# Patient Record
Sex: MALE | Race: WHITE | Employment: OTHER | URBAN - METROPOLITAN AREA
[De-identification: names, ages, dates, MRNs, and addresses within clinical notes are randomized per-mention and may not be internally consistent; named-entity substitution may affect disease eponyms.]

---

## 2017-03-07 RX ORDER — ATORVASTATIN CALCIUM 40 MG/1
TABLET, FILM COATED ORAL
Qty: 90 TAB | Refills: 3 | Status: SHIPPED | OUTPATIENT
Start: 2017-03-07 | End: 2017-12-22 | Stop reason: SDUPTHER

## 2017-05-16 RX ORDER — METOPROLOL TARTRATE 50 MG/1
TABLET ORAL
Qty: 180 TAB | Refills: 3 | Status: SHIPPED | OUTPATIENT
Start: 2017-05-16 | End: 2019-01-11

## 2017-06-19 ENCOUNTER — OFFICE VISIT (OUTPATIENT)
Dept: UROLOGY | Age: 72
End: 2017-06-19

## 2017-06-19 ENCOUNTER — HOSPITAL ENCOUNTER (OUTPATIENT)
Dept: LAB | Age: 72
Discharge: HOME | End: 2017-06-19
Payer: MEDICARE

## 2017-06-19 VITALS
HEIGHT: 69 IN | TEMPERATURE: 98 F | WEIGHT: 197 LBS | OXYGEN SATURATION: 97 % | BODY MASS INDEX: 29.18 KG/M2 | DIASTOLIC BLOOD PRESSURE: 64 MMHG | HEART RATE: 59 BPM | SYSTOLIC BLOOD PRESSURE: 120 MMHG

## 2017-06-19 DIAGNOSIS — C61 PROSTATE CA (HCC): ICD-10-CM

## 2017-06-19 DIAGNOSIS — C61 PROSTATE CA (HCC): Primary | ICD-10-CM

## 2017-06-19 LAB
BILIRUB UR QL STRIP: NEGATIVE
GLUCOSE UR-MCNC: NEGATIVE MG/DL
KETONES P FAST UR STRIP-MCNC: NEGATIVE MG/DL
PH UR STRIP: 7 [PH] (ref 4.6–8)
PROT UR QL STRIP: NEGATIVE MG/DL
PSA SERPL-MCNC: <0.1 NG/ML (ref 0–4)
SP GR UR STRIP: 1.02 (ref 1–1.03)
UA UROBILINOGEN AMB POC: NORMAL (ref 0.2–1)
URINALYSIS CLARITY POC: CLEAR
URINALYSIS COLOR POC: YELLOW
URINE BLOOD POC: NEGATIVE
URINE LEUKOCYTES POC: NEGATIVE
URINE NITRITES POC: NEGATIVE

## 2017-06-19 PROCEDURE — 84153 ASSAY OF PSA TOTAL: CPT | Performed by: UROLOGY

## 2017-06-19 NOTE — PROGRESS NOTES
Mr. Corinne Bulla has a reminder for a \"due or due soon\" health maintenance. I have asked that he contact his primary care provider for follow-up on this health maintenance.

## 2017-06-19 NOTE — MR AVS SNAPSHOT
Visit Information Date & Time Provider Department Dept. Phone Encounter #  
 6/19/2017  9:30 AM Alphonso Eulogio, [address] [facility] [phone] Follow-up Instructions Return in about 6 months (around 12/19/2017) for psa. Follow-up and Disposition History Your Appointments 10/2/2017  8:15 AM  
ESTABLISHED PATIENT with Lynn Morris MD  
[facility] ([address] [place] [address] Road) Appt Note: 1 year follow up  
 [address] [facility] [address]  
  
   
 [address] [place] [phone]  
  
    
 12/15/2017  7:55 AM  
LAB with [facility] NURSE VISIT Internist of [facility][note 3] ([address] [place] [address]) Appt Note: 1 yr cpe  
 [address] [facility], [address] [place] [address] [place]  
  
   
 [address], [address]  
  
    
 12/18/2017  9:15 AM  
Office Visit with Lotus Roberto MD  
[facility] [address] [place] [address]) Appt Note: PSA  
 [address] [address]  
[phone] [address] [phone]  
  
    
 12/22/2017  1:00 PM  
PHYSICAL with Sonya Tim MD  
Internist of [address]) Appt Note: rpe  
 [address] [facility], [address] [place] [address] [place]  
  
   
 [address], [address] Upcoming Health Maintenance Date Due DTaP/Tdap/Td series (1 - Tdap) 6/24/2009 MEDICARE YEARLY EXAM 6/21/2017 INFLUENZA AGE 9 TO ADULT 8/1/2017 GLAUCOMA SCREENING Q2Y 5/10/2019 COLONOSCOPY 5/9/2022 Allergies as of 6/19/2017  Review Complete On: 6/19/2017 By: Prerna José No Known Allergies Current Immunizations  Reviewed on 6/17/2016 Name Date Influenza High Dose Vaccine PF 12/6/2016 Influenza Vaccine PF 10/31/2014  2:55 PM  
 Influenza Vaccine Whole 1/1/2010 Pneumococcal Conjugate (PCV-13) 6/9/2014 Pneumococcal Polysaccharide (PPSV-23) 10/31/2014  2:57 PM  
 Pneumococcal Vaccine (Unspecified Type) 1/1/2009 TD Vaccine 6/23/2009 Zoster Vaccine, Live 1/1/2011 Not reviewed this visit You Were Diagnosed With   
  
 Codes Comments Prostate CA [facility])    -  Primary ICD-10-CM: Y35 ICD-9-CM: 926 Vitals BP Pulse Temp Height(growth percentile) Weight(growth percentile) SpO2  
 120/64 (BP 1 Location: Left arm, BP Patient Position: Sitting) (!) 59 98 °F (36.7 °C) 5' 9\" (1.753 m) 197 lb (89.4 kg) 97% BMI Smoking Status 29.09 kg/m2 Never Smoker Vitals History BMI and BSA Data Body Mass Index Body Surface Area  
 29.09 kg/m 2 2.09 m 2 Preferred Pharmacy Pharmacy Name Phone [address] [phone] Your Updated Medication List  
  
   
This list is accurate as of: 6/19/17 10:08 AM.  Always use your most recent med list.  
  
  
  
  
 aspirin 81 mg tablet Take 1 Tab by mouth daily. atorvastatin 40 mg tablet Commonly known as:  LIPITOR  
TAKE 1 TABLET EVERY DAY  
  
 cholecalciferol 1,000 unit Cap Commonly known as:  VITAMIN D3 Take  by mouth as needed. metoprolol tartrate 50 mg tablet Commonly known as:  LOPRESSOR  
TAKE 1 TABLET TWICE A DAY  
  
 nitroglycerin 0.4 mg SL tablet Commonly known as:  NITROSTAT  
1 Tab by SubLINGual route every five (5) minutes as needed for up to 3 doses. OCUVITE tablet Generic drug:  vitamin a-vitamin c-vit e-min Take 1 Tab by mouth daily. We Performed the Following AMB POC URINALYSIS DIP STICK AUTO W/O MICRO [35407 CPT(R)] MO COLLECTION VENOUS BLOOD,VENIPUNCTURE [id number] CPT(R)] Follow-up Instructions Return in about 6 months (around 12/19/2017) for psa. To-Do List   
 06/19/2017 Lab:  PROSTATE SPECIFIC AG (PSA) Patient Instructions Prostate-Specific Antigen (PSA) Test: About This Test 
What is it? A prostate-specific antigen (PSA) test measures the amount of PSA in your blood. PSA is released by a man's prostate gland into his blood. A high PSA level may mean that you have an enlargement, infection, or cancer of the prostate. Why is this test done? You may have this test to: · Check for prostate cancer. · Watch prostate cancer and see if treatment is working. How can you prepare for the test? 
Do not ejaculate during the 2 days before your PSA blood test, either during sex or masturbation. What happens before the test? 
Tell your doctor if you have had a: 
· Test to look at your bladder (cystoscopy) in the past several weeks. · Prostate biopsy in the past several weeks. · Prostate infection or urinary tract infection that has not gone away. · Tube (catheter) inserted into your bladder to drain urine recently. What happens during the test? 
A health professional takes a sample of your blood. What happens after the test? 
You can go back to your usual activities right away. When should you call for help? Watch closely for changes in your health, and be sure to contact your doctor if you have any questions about this test. 
Follow-up care is a key part of your treatment and safety. Be sure to make and go to all appointments, and call your doctor if you are having problems. It's also a good idea to keep a list of the medicines you take. Ask your doctor when you can expect to have your test results. Where can you learn more? Go to http://tere-bonifacio.info/. Enter L731 in the search box to learn more about \"Prostate-Specific Antigen (PSA) Test: About This Test.\" Current as of: July 26, 2016 Content Version: 11.2 © 4055-3677 [employer].  Care instructions adapted under license by [employer] (which disclaims liability or warranty for this information). If you have questions about a medical condition or this instruction, always ask your healthcare professional. [address] any warranty or liability for your use of this information. Patient Instructions History Introducing [address] Ana María Cain introduces [employer] patient portal. Now you can access parts of your medical record, email your doctor's office, and request medication refills online. 1. In your internet browser, go to [email or web address]. Beeline/"[email or web address] 2. Click on the First Time User? Click Here link in the Sign In box. You will see the New Member Sign Up page. 3. Enter your [employer] Access Code exactly as it appears below. You will not need to use this code after youve completed the sign-up process. If you do not sign up before the expiration date, you must request a new code. · [employer] Access Code: [email or web address] Expires: 9/17/2017  9:29 AM 
 
4. Enter the last four digits of your Social Security Number (xxxx) and Date of Birth (mm/dd/yyyy) as indicated and click Submit. You will be taken to the next sign-up page. 5. Create a [employer] ID. This will be your [employer] login ID and cannot be changed, so think of one that is secure and easy to remember. 6. Create a [employer] password. You can change your password at any time. 7. Enter your Password Reset Question and Answer. This can be used at a later time if you forget your password. 8. Enter your e-mail address. You will receive e-mail notification when new information is available in [address] 9. Click Sign Up. You can now view and download portions of your medical record. 10. Click the Download Summary menu link to download a portable copy of your medical information. If you have questions, please visit the Frequently Asked Questions section of the [employer] website. Remember, [employer] is NOT to be used for urgent needs. For medical emergencies, dial 911. Now available from your iPhone and Android! Please provide this summary of care documentation to your next provider. Your primary care clinician is listed as Yoselin Morrissey. If you have any questions after today's visit, please call [phone].

## 2017-06-19 NOTE — PATIENT INSTRUCTIONS
Prostate-Specific Antigen (PSA) Test: About This Test  What is it? A prostate-specific antigen (PSA) test measures the amount of PSA in your blood. PSA is released by a man's prostate gland into his blood. A high PSA level may mean that you have an enlargement, infection, or cancer of the prostate. Why is this test done? You may have this test to:  · Check for prostate cancer. · Watch prostate cancer and see if treatment is working. How can you prepare for the test?  Do not ejaculate during the 2 days before your PSA blood test, either during sex or masturbation. What happens before the test?  Tell your doctor if you have had a:  · Test to look at your bladder (cystoscopy) in the past several weeks. · Prostate biopsy in the past several weeks. · Prostate infection or urinary tract infection that has not gone away. · Tube (catheter) inserted into your bladder to drain urine recently. What happens during the test?  A health professional takes a sample of your blood. What happens after the test?  You can go back to your usual activities right away. When should you call for help? Watch closely for changes in your health, and be sure to contact your doctor if you have any questions about this test.  Follow-up care is a key part of your treatment and safety. Be sure to make and go to all appointments, and call your doctor if you are having problems. It's also a good idea to keep a list of the medicines you take. Ask your doctor when you can expect to have your test results. Where can you learn more? Go to http://tere-bonifacio.info/. Enter J555 in the search box to learn more about \"Prostate-Specific Antigen (PSA) Test: About This Test.\"  Current as of: July 26, 2016  Content Version: 11.2  © 6330-8979 [employer]. Care instructions adapted under license by [employer] (which disclaims liability or warranty for this information).  If you have questions about a medical condition or this instruction, always ask your healthcare professional. [place] [address] any warranty or liability for your use of this information.

## 2017-06-19 NOTE — PROGRESS NOTES
Derick Tavera    Chief Complaint   Patient presents with    Prostate Cancer       History and Physical    The patient is now 20 months postop buttock prostatectomy at [facility] for a stage T2c prostate cancer with maximum core involvement of 16%, [place] score 7 preoperative PSA 6.6 and 6 of 12 cores positive. The patient is voiding well. Excellent control. No bowel issues. No dysuria or hematuria or restriction of the stream.    The patient is noticing return of his erections. He has used his vacuum erection device on a couple of occasions but only to inflate the penis within the cylinder and has not applied the constriction ring either for intercourse or to rehab the penis by reducing the intracavernosal pressure. I have again discussed using the vacuum erection device as a rehabilitative mechanism to speed up the return of natural erection.     Because the patient is on nitroglycerin he is not a candidate for oral medication and we will consider suppositories as needed    Past Medical History:   Diagnosis Date    Basal cell carcinoma     right arm Dr Albin Dyer CAD (coronary artery disease) 4/12    s/p stent LAD Dr. Lia Crystal Colon polyp     2007; Dr Florentino Abarca 5/17    Elevated prostate specific antigen (PSA)     Essential hypertension     Frozen shoulder syndrome 2007    Dr. Santa Bowling Hyperlipidemia     Hyperlipidemia     Hypertrophy of prostate with urinary obstruction and other lower urinary tract symptoms (LUTS)     IBS (irritable bowel syndrome)     Impaired fasting glucose 3/12    Obesity     peak weight 217 lbs, bmi 32 from 12/14    Phymatous rosacea     Prostate CA ([facility] [address]     T2C [place] 7 Dr Jorje Ya 8/15    Squamous cell cancer of skin of nose 2016    s/p Moh's Dr Albin Dyer Zoster 1980s     Patient Active Problem List   Diagnosis Code    Impaired fasting glucose R73.01    Hyperlipidemia E78.5    Prostate ca [facility]) [place] 7 s/p [place] prostatectomy Dr Mai Jon 10/15 C61    Essential hypertension I10    Coronary artery disease involving native coronary artery without angina pectoris Dr Tawanda Pena I25.10    Advance directive discussed with patient Z71.89     Past Surgical History:   Procedure Laterality Date    ABDOMEN SURGERY PROC UNLISTED  1988    inguinal hernia repair on left    HX COLONOSCOPY      Dr Sixto Mckeon 2007 polyp; Dr Sixto Mckeon 5/17 polyp    HX CORONARY STENT PLACEMENT  3/12    3/12 prox LAD AJITH    HX TONSILLECTOMY      HX UROLOGICAL  06/10    TURP    HX UROLOGICAL  05/10    TURP with biopsy 7/08, 5/10    HX UROLOGICAL  10/15    DaVinci prostatectomy Dr Berna Raymundo [facility] [address] [place] [address], NEEDLE, SATURATION SAMPLING      06/10, 5/10, 07/08, 8/15     Current Outpatient Prescriptions   Medication Sig Dispense Refill    metoprolol tartrate (LOPRESSOR) 50 mg tablet TAKE 1 TABLET TWICE A  Tab 3    atorvastatin (LIPITOR) 40 mg tablet TAKE 1 TABLET EVERY DAY 90 Tab 3    vitamin a-vitamin c-vit e-min (OCUVITE) tablet Take 1 Tab by mouth daily.  aspirin 81 mg tablet Take 1 Tab by mouth daily. 90 Tab 3    nitroglycerin (NITROSTAT) 0.4 mg SL tablet 1 Tab by SubLINGual route every five (5) minutes as needed for up to 3 doses. 25 Tab 0    Cholecalciferol, Vitamin D3, 1,000 unit cap Take  by mouth as needed.        No Known Allergies  Social History     Social History    Marital status:      Spouse name: N/A    Number of children: 3    Years of education: N/A     Occupational History    engr [employer]      Social History Main Topics    Smoking status: Never Smoker    Smokeless tobacco: Never Used    Alcohol use Yes      Comment: greater than 15-20 12-ounce beers/week    Drug use: No    Sexual activity: Not on file     Other Topics Concern    Not on file     Social History Narrative      Family History   Problem Relation Age of Onset    Heart Disease Mother     Lung Disease Father     Dementia Father     Stroke Paternal Grandmother Visit Vitals    /64 (BP 1 Location: Left arm, BP Patient Position: Sitting)    Pulse (!) 59    Temp 98 °F (36.7 °C)    Ht 5' 9\" (1.753 m)    Wt 197 lb (89.4 kg)    SpO2 97%    BMI 29.09 kg/m2     Physical        Gen: WDWN adult NAD  Head  : normocephalic,  Normal ROM; eyes without normal pupils, EOMs, no masses;  conjunctiva normal  Neck: normal movement,  no evident mass,  No evident adenopathy, trachea midline,    Abd :bowel sounds normal, no masses, tenderness, organomegaly  Flanks     -    Extremities- no edema, arthritis, deformity, swelling  Psych- oriented, no evident anxiety, no cognitive impairment evident            Urine is negative  PSA is drawn      Impression/ PLAN  Patient is 20 months postop robotic prostatectomy for prostate cancer, [place] 7. PSA has been undetectable  Plan: We will call the patient with current PSA and patient will return in 6 months            This visit exceeded 15 minutes and >50% was counselling  The patient understands the discussion and plan    PLEASE NOTE:      This document has been produced using voice recognition software.   Unrecognized errors in transcription may be present    Kim Crandall MD

## 2017-10-02 ENCOUNTER — OFFICE VISIT (OUTPATIENT)
Dept: CARDIOLOGY CLINIC | Age: 72
End: 2017-10-02

## 2017-10-02 VITALS
WEIGHT: 199 LBS | SYSTOLIC BLOOD PRESSURE: 139 MMHG | DIASTOLIC BLOOD PRESSURE: 76 MMHG | HEART RATE: 58 BPM | HEIGHT: 69 IN | BODY MASS INDEX: 29.47 KG/M2

## 2017-10-02 DIAGNOSIS — E78.00 PURE HYPERCHOLESTEROLEMIA: ICD-10-CM

## 2017-10-02 DIAGNOSIS — Z95.5 HISTORY OF CORONARY ARTERY STENT PLACEMENT: ICD-10-CM

## 2017-10-02 DIAGNOSIS — I10 ESSENTIAL HYPERTENSION: ICD-10-CM

## 2017-10-02 DIAGNOSIS — I25.10 CORONARY ARTERY DISEASE INVOLVING NATIVE CORONARY ARTERY OF NATIVE HEART WITHOUT ANGINA PECTORIS: Primary | ICD-10-CM

## 2017-10-02 NOTE — PATIENT INSTRUCTIONS
There are no discontinued medications.     Orders Placed This Encounter    LIPID PANEL     Standing Status:   Future     Standing Expiration Date:   1/2/2018    HEPATIC FUNCTION PANEL     Standing Status:   Future     Standing Expiration Date:   1/2/2018    SCHEDULE NUCLEAR STUDY     exercise    AMB POC EKG ROUTINE W/ 12 LEADS, INTER & REP     Order Specific Question:   Reason for Exam:     Answer:   hypertension

## 2017-10-02 NOTE — PROGRESS NOTES
1. Have you been to the ER, urgent care clinic since your last visit? Hospitalized since your last visit? No    2. Have you seen or consulted any other health care providers outside of the [address] [place] [address] since your last visit? Include any pap smears or colon screening. Yes Where: Urologist     3. Since your last visit, have you had any of the following symptoms? .           4. Have you had any blood work, X-rays or cardiac testing? Yes Where: Urologist Office Reason for visit: Labs             5.  Where do you normally have your labs drawn? PCP Office    6. Do you need any refills today?    No

## 2017-10-02 NOTE — LETTER
Eliud Carrasquillo 
1945 
 
10/2/2017 Dear MD Atif Munguia MD Glenda Alexander, MD 
 
I had the pleasure of evaluating  Mr. Bozena Tracy in office today. Below are the relevant portions of my assessment and plan of care. ICD-10-CM ICD-9-CM 1. Coronary artery disease involving native coronary artery of native heart without angina pectoris I25.10 414.01 SCHEDULE NUCLEAR STUDY  
 10/17; 10/16 Stable symptoms; no angina 
surveillance stress test  
2. Essential hypertension I10 401.9 AMB POC EKG ROUTINE W/ 12 LEADS, INTER & REP Controlled; home BPs 120s/80s 3. Pure hypercholesterolemia E78.00 272.0 LIPID PANEL  
   HEPATIC FUNCTION PANEL  
 12/15 LDL59;   
4. History of coronary artery stent placement Z95.5 V45.82 SCHEDULE NUCLEAR STUDY  
 3/12 LAD PCI Current Outpatient Prescriptions Medication Sig Dispense Refill  metoprolol tartrate (LOPRESSOR) 50 mg tablet TAKE 1 TABLET TWICE A  Tab 3  
 atorvastatin (LIPITOR) 40 mg tablet TAKE 1 TABLET EVERY DAY 90 Tab 3  
 nitroglycerin (NITROSTAT) 0.4 mg SL tablet 1 Tab by SubLINGual route every five (5) minutes as needed for up to 3 doses. 25 Tab 0  Cholecalciferol, Vitamin D3, 1,000 unit cap Take  by mouth as needed.  vitamin a-vitamin c-vit e-min (OCUVITE) tablet Take 1 Tab by mouth daily.  aspirin 81 mg tablet Take 1 Tab by mouth daily. 90 Tab 3 Orders Placed This Encounter  LIPID PANEL Standing Status:   Future Standing Expiration Date:   1/2/2018  
 HEPATIC FUNCTION PANEL Standing Status:   Future Standing Expiration Date:   1/2/2018  SCHEDULE NUCLEAR STUDY  
  exercise  AMB POC EKG ROUTINE W/ 12 LEADS, INTER & REP Order Specific Question:   Reason for Exam: Answer:   hypertension If you have questions, please do not hesitate to call me. I look forward to following Mr. Bozena Tracy along with you. Sincerely, King Ottoniel MD

## 2017-10-02 NOTE — MR AVS SNAPSHOT
Visit Information Date & Time Provider Department Dept. Phone Encounter #  
 10/2/2017 12:30 PM Ngoc Gee MD [facility] 835 [id number] Follow-up Instructions Return in about 4 months (around 2/2/2018), or if symptoms worsen or fail to improve, for same day post test.  
  
Your Appointments 12/15/2017  7:55 AM  
LAB with [facility] NURSE VISIT [facility]) Appt Note: 1 yr cpe  
 [address] [facility], [address] [address] [address] [place]  
  
   
 [address] [facility]  
  
    
 12/18/2017  9:15 AM  
Office Visit with Lucretia Saeed MD  
[facility]) Appt Note: PSA  
 [address] [address]  
[phone] [address] [address]  
  
    
 12/22/2017  1:00 PM  
PHYSICAL with Montana Soares MD  
[facility]) Appt Note: rpe  
 [address] [facility], [address] [address] [address] [place]  
  
   
 [address] [facility]  
  
    
 1/8/2018  7:30 AM  
PROCEDURE with FER DIALLO [facility] [place] [facility] Appt Note: Est/Guerrero/Same day follow up  
 [address] [facility] [address]  
  
   
 [id number] [place] [address] Upcoming Health Maintenance Date Due DTaP/Tdap/Td series (1 - Tdap) 6/24/2009 MEDICARE YEARLY EXAM 6/21/2017 INFLUENZA AGE 9 TO ADULT 8/1/2017 GLAUCOMA SCREENING Q2Y 5/10/2019 COLONOSCOPY 5/9/2022 Allergies as of 10/2/2017  Review Complete On: 10/2/2017 By: Ngoc Gee MD  
 No Known Allergies Current Immunizations  Reviewed on 6/17/2016 Name Date Influenza High Dose Vaccine PF 12/6/2016 Influenza Vaccine PF 10/31/2014  2:55 PM  
 Influenza Vaccine Whole 1/1/2010 Pneumococcal Conjugate (PCV-13) 6/9/2014 Pneumococcal Polysaccharide (PPSV-23) 10/31/2014  2:57 PM  
 TD Vaccine 6/23/2009 ZZZ-RETIRED (DO NOT USE) Pneumococcal Vaccine (Unspecified Type) 1/1/2009 Zoster Vaccine, Live 1/1/2011 Not reviewed this visit You Were Diagnosed With   
  
 Codes Comments Coronary artery disease involving native coronary artery of native heart without angina pectoris    -  Primary ICD-10-CM: I25.10 ICD-9-CM: 414.01 10/17; 10/16 Stable symptoms; no angina 
surveillance stress test  
 Essential hypertension     ICD-10-CM: I10 
ICD-9-CM: 401.9 Controlled; home BPs 120s/80s Pure hypercholesterolemia     ICD-10-CM: E78.00 ICD-9-CM: 272.0 12/15 LDL59;   
 History of coronary artery stent placement     ICD-10-CM: Z95.5 ICD-9-CM: V45.82 3/12 LAD PCI Vitals BP Pulse Height(growth percentile) Weight(growth percentile) BMI Smoking Status 139/76 (!) 58 5' 9\" (1.753 m) 199 lb (90.3 kg) 29.39 kg/m2 Never Smoker Vitals History BMI and BSA Data Body Mass Index Body Surface Area  
 29.39 kg/m 2 2.1 m 2 Preferred Pharmacy Pharmacy Name Phone Lauren Mao [address] [place] [address] [address] [phone] Your Updated Medication List  
  
   
This list is accurate as of: 10/2/17  1:13 PM.  Always use your most recent med list.  
  
  
  
  
 aspirin 81 mg tablet Take 1 Tab by mouth daily. atorvastatin 40 mg tablet Commonly known as:  LIPITOR  
TAKE 1 TABLET EVERY DAY  
  
 cholecalciferol 1,000 unit Cap Commonly known as:  VITAMIN D3 Take  by mouth as needed. metoprolol tartrate 50 mg tablet Commonly known as:  LOPRESSOR  
TAKE 1 TABLET TWICE A DAY  
  
 nitroglycerin 0.4 mg SL tablet Commonly known as:  NITROSTAT  
1 Tab by SubLINGual route every five (5) minutes as needed for up to 3 doses. OCUVITE tablet Generic drug:  vitamin a-vitamin c-vit e-min Take 1 Tab by mouth daily. We Performed the Following AMB POC EKG ROUTINE W/ 12 LEADS, INTER & REP [76136 CPT(R)] SCHEDULE NUCLEAR STUDY [SAD3221 Custom] Comments:  
 exercise Follow-up Instructions Return in about 4 months (around 2018), or if symptoms worsen or fail to improve, for same day post test.  
  
To-Do List   
 Around 10/02/2017 Lab:  HEPATIC FUNCTION PANEL Around 10/02/2017 Lab:  LIPID PANEL Patient Instructions There are no discontinued medications. Orders Placed This Encounter  LIPID PANEL Standing Status:   Future Standing Expiration Date:   2018  
 HEPATIC FUNCTION PANEL Standing Status:   Future Standing Expiration Date:   2018  SCHEDULE NUCLEAR STUDY  
  exercise  AMB POC EKG ROUTINE W/ 12 LEADS, INTER & REP Order Specific Question:   Reason for Exam: Answer:   hypertension Introducing [facility]! Delmar Bonilla introduces [employer] patient portal. Now you can access parts of your medical record, email your doctor's office, and request medication refills online. 1. In your internet browser, go to [email or web address] 2. Click on the First Time User? Click Here link in the Sign In box. You will see the New Member Sign Up page. 3. Enter your [employer] Access Code exactly as it appears below. You will not need to use this code after youve completed the sign-up process. If you do not sign up before the expiration date, you must request a new code. · [employer] Access Code: [email or web address] Expires: 2017  1:09 PM 
 
4. Enter the last four digits of your Social Security Number (xxxx) and Date of Birth (mm/dd/yyyy) as indicated and click Submit. You will be taken to the next sign-up page. 5. Create a [employer] ID. This will be your [employer] login ID and cannot be changed, so think of one that is secure and easy to remember. 6. Create a [employer] password. You can change your password at any time. 7. Enter your Password Reset Question and Answer. This can be used at a later time if you forget your password. 8. Enter your e-mail address. You will receive e-mail notification when new information is available in [address] 9. Click Sign Up. You can now view and download portions of your medical record. 10. Click the Download Summary menu link to download a portable copy of your medical information. If you have questions, please visit the Frequently Asked Questions section of the [employer] website. Remember, [employer] is NOT to be used for urgent needs. For medical emergencies, dial 911. Now available from your iPhone and Android! Please provide this summary of care documentation to your next provider. Your primary care clinician is listed as Raz Crow. If you have any questions after today's visit, please call [phone].

## 2017-10-02 NOTE — PROGRESS NOTES
HISTORY OF PRESENT ILLNESS  Jerald Nuñez is a 80 y.o. male. HPI Comments: F/u CAD, old LAD PCI, HTN, HLD    Patient denies significant chest pain, SOB, palpitations, edema, dizziness      Cholesterol Problem   The history is provided by the medical records. This is a chronic problem. Pertinent negatives include no chest pain, no headaches and no shortness of breath. Hypertension   The history is provided by the medical records. This is a chronic problem. Pertinent negatives include no chest pain, no headaches and no shortness of breath. Review of Systems   Constitutional: Negative for chills, fever, malaise/fatigue and weight loss. HENT: Negative for nosebleeds. Eyes: Negative for discharge. Respiratory: Negative for cough, shortness of breath and wheezing. Cardiovascular: Negative for chest pain, palpitations, orthopnea, claudication, leg swelling and PND. Gastrointestinal: Negative for diarrhea, nausea and vomiting. Genitourinary: Negative for dysuria and hematuria. Musculoskeletal: Negative for joint pain. Skin: Negative for rash. Neurological: Negative for dizziness, seizures, loss of consciousness and headaches. Endo/Heme/Allergies: Negative for polydipsia. Does not bruise/bleed easily. Psychiatric/Behavioral: Negative for depression and substance abuse. The patient does not have insomnia.       No Known Allergies    Past Medical History:   Diagnosis Date    Basal cell carcinoma     right arm Dr Roger Montesinos CAD (coronary artery disease) 4/12    s/p stent LAD Dr. Abisai Guerrero Colon polyp     2007; Dr Tatiana Diaz 5/17    Elevated prostate specific antigen (PSA)     Essential hypertension     Frozen shoulder syndrome 2007    Dr. Fausto Messina Hyperlipidemia     Hyperlipidemia     Hypertrophy of prostate with urinary obstruction and other lower urinary tract symptoms (LUTS)     IBS (irritable bowel syndrome)     Impaired fasting glucose 3/12    Obesity     peak weight 217 lbs, bmi 28 from 12/14    Phymatous rosacea     Prostate CA (HCC)     T2C [place] 7 Dr Eduardo Arm 8/15    Squamous cell cancer of skin of nose 2016    s/p Moh's Dr Bev Martin Zoster 1980s       Family History   Problem Relation Age of Onset    Heart Disease Mother     Lung Disease Father     Dementia Father     Stroke Paternal Grandmother        Social History   Substance Use Topics    Smoking status: Never Smoker    Smokeless tobacco: Never Used    Alcohol use Yes      Comment: greater than 15-20 12-ounce beers/week        Current Outpatient Prescriptions   Medication Sig    metoprolol tartrate (LOPRESSOR) 50 mg tablet TAKE 1 TABLET TWICE A DAY    atorvastatin (LIPITOR) 40 mg tablet TAKE 1 TABLET EVERY DAY    nitroglycerin (NITROSTAT) 0.4 mg SL tablet 1 Tab by SubLINGual route every five (5) minutes as needed for up to 3 doses.  Cholecalciferol, Vitamin D3, 1,000 unit cap Take  by mouth as needed.  vitamin a-vitamin c-vit e-min (OCUVITE) tablet Take 1 Tab by mouth daily.  aspirin 81 mg tablet Take 1 Tab by mouth daily. No current facility-administered medications for this visit.          Past Surgical History:   Procedure Laterality Date    ABDOMEN SURGERY PROC UNLISTED  1988    inguinal hernia repair on left    HX COLONOSCOPY      Dr Nadia Chatman 2007 polyp; Dr Nadia Chatman 5/17 polyp    HX CORONARY STENT PLACEMENT  3/12    3/12 prox LAD AJITH    HX TONSILLECTOMY      HX UROLOGICAL  06/10    TURP    HX UROLOGICAL  05/10    TURP with biopsy 7/08, 5/10    HX UROLOGICAL  10/15    DaVinci prostatectomy Dr Elana Weinberg in [address] [place] [address], NEEDLE, SATURATION SAMPLING      06/10, 5/10, 07/08, 8/15       Diagnostic Studies:  CARDIOLOGY STUDIES 3/28/2012 3/1/2012   EKG Result - Angelique Buff, WNL   Myocardial Perfusion Scan Result - abnormal EKG, ant ischemia, nl EF   Cardiac Cath with PCI Result AJITH prox LAD -   Some recent data might be hidden       Visit Vitals    /76    Pulse (!) 58    Ht 5' 9\" (1.753 m)    Wt 90.3 kg (199 lb)    BMI 29.39 kg/m2       Mr. Kaylie Fuentes has a reminder for a \"due or due soon\" health maintenance. I have asked that he contact his primary care provider for follow-up on this health maintenance. Physical Exam   Constitutional: He is oriented to person, place, and time. He appears well-developed and well-nourished. No distress. HENT:   Head: Normocephalic and atraumatic. Eyes: Right eye exhibits no discharge. Left eye exhibits no discharge. No scleral icterus. Neck: Neck supple. No JVD present. Carotid bruit is not present. No thyromegaly present. Cardiovascular: Normal rate, regular rhythm, S1 normal, S2 normal, normal heart sounds and intact distal pulses. Exam reveals no gallop and no friction rub. No murmur heard. Pulmonary/Chest: Effort normal and breath sounds normal. He has no wheezes. He has no rales. Abdominal: Soft. He exhibits no mass. There is no tenderness. Musculoskeletal: He exhibits no edema. Neurological: He is alert and oriented to person, place, and time. Skin: Skin is warm and dry. No rash noted. Psychiatric: He has a normal mood and affect. His behavior is normal.       ASSESSMENT and PLAN          Diagnoses and all orders for this visit:    1. Coronary artery disease involving native coronary artery of native heart without angina pectoris  Comments:  10/17; 10/16 Stable symptoms; no angina  surveillance stress test  Orders:  -     SCHEDULE NUCLEAR STUDY    2. Essential hypertension  Comments:  Controlled; home BPs 120s/80s    Orders:  -     AMB POC EKG ROUTINE W/ 12 LEADS, INTER & REP    3. Pure hypercholesterolemia  Comments:  12/15 LDL59;   Orders:  -     LIPID PANEL; Future  -     HEPATIC FUNCTION PANEL; Future    4. History of coronary artery stent placement  Comments:  3/12 LAD PCI  Orders:  -     SCHEDULE NUCLEAR STUDY        Pertinent laboratory and test data reviewed and discussed with patient.   See patient instructions also for other medical advice given    There are no discontinued medications.     Follow-up Disposition:  Return in about 4 months (around 2/2/2018), or if symptoms worsen or fail to improve, for same day post test.

## 2017-10-16 ENCOUNTER — OFFICE VISIT (OUTPATIENT)
Dept: INTERNAL MEDICINE CLINIC | Age: 72
End: 2017-10-16

## 2017-10-16 VITALS
RESPIRATION RATE: 14 BRPM | SYSTOLIC BLOOD PRESSURE: 142 MMHG | TEMPERATURE: 98.1 F | WEIGHT: 199.6 LBS | OXYGEN SATURATION: 97 % | HEART RATE: 51 BPM | BODY MASS INDEX: 29.56 KG/M2 | DIASTOLIC BLOOD PRESSURE: 80 MMHG | HEIGHT: 69 IN

## 2017-10-16 DIAGNOSIS — R00.1 BRADYCARDIA: ICD-10-CM

## 2017-10-16 DIAGNOSIS — Z23 ENCOUNTER FOR IMMUNIZATION: ICD-10-CM

## 2017-10-16 DIAGNOSIS — H81.10 POSTURAL VERTIGO, UNSPECIFIED LATERALITY: Primary | ICD-10-CM

## 2017-10-16 RX ORDER — MECLIZINE HYDROCHLORIDE 25 MG/1
TABLET ORAL
Qty: 30 TAB | Refills: 0 | Status: SHIPPED | OUTPATIENT
Start: 2017-10-16 | End: 2018-09-17

## 2017-10-16 NOTE — PROGRESS NOTES
1. Have you been to the ER, urgent care clinic or hospitalized since your last visit? NO.     2. Have you seen or consulted any other health care providers outside of the [address] [place] [address] since your last visit (Include any pap smears or colon screening)? NO      Do you have an Advanced Directive? NO    Would you like information on Advanced Directives?  NO

## 2017-10-16 NOTE — MR AVS SNAPSHOT
Visit Information Date & Time Provider Department Dept. Phone Encounter #  
 10/16/2017  1:30 PM Bassam Fuentes [facility] [phone] [id number] Your Appointments 12/15/2017  7:55 AM  
LAB with [facility] NURSE VISIT [facility]) Appt Note: 1 yr cpe  
 [address] [facility], [address] [address] [address] [place]  
  
   
 [address], [address]  
  
    
 12/18/2017  9:15 AM  
Office Visit with Shai Pereira MD  
[facility]) Appt Note: PSA  
 [address] [address]  
[phone] [address] [address]  
  
    
 12/22/2017  1:00 PM  
PHYSICAL with Andreia Sheikh MD  
[facility]) Appt Note: rpe  
 [address] [facility], [address] [place] [address] [place] [address] [place]  
  
   
 [address], [address]  
  
    
 1/8/2018  7:30 AM  
PROCEDURE with [place] IMANI [facility] [place] ([facility]) Appt Note: Est/Guerrero/Same day follow up  
 [address] [facility] [address]  
  
   
 [address] [address] [address] [address] Upcoming Health Maintenance Date Due DTaP/Tdap/Td series (1 - Tdap) 6/24/2009 MEDICARE YEARLY EXAM 6/21/2017 INFLUENZA AGE 9 TO ADULT 8/1/2017 GLAUCOMA SCREENING Q2Y 5/10/2019 COLONOSCOPY 5/9/2022 Allergies as of 10/16/2017  Review Complete On: 10/16/2017 By: GEORGE Fuentes No Known Allergies Current Immunizations  Reviewed on 6/17/2016 Name Date Influenza High Dose Vaccine PF  Incomplete, 12/6/2016 Influenza Vaccine PF 10/31/2014  2:55 PM  
 Influenza Vaccine Whole 1/1/2010 Pneumococcal Conjugate (PCV-13) 6/9/2014 Pneumococcal Polysaccharide (PPSV-23) 10/31/2014  2:57 PM  
 TD Vaccine 6/23/2009 ZZZ-RETIRED (DO NOT USE) Pneumococcal Vaccine (Unspecified Type) 1/1/2009 Zoster Vaccine, Live 1/1/2011 Not reviewed this visit You Were Diagnosed With   
  
 Codes Comments Postural vertigo, unspecified laterality    -  Primary ICD-10-CM: H81.10 ICD-9-CM: 386.11 Encounter for immunization     ICD-10-CM: U55 ICD-9-CM: V03.89 Vitals BP Pulse Temp Resp Height(growth percentile) Weight(growth percentile) 142/80 (BP 1 Location: Right arm, BP Patient Position: Sitting) (!) 51 98.1 °F (36.7 °C) (Oral) 14 5' 9\" (1.753 m) 199 lb 9.6 oz (90.5 kg) SpO2 BMI Smoking Status 97% 29.48 kg/m2 Never Smoker Vitals History BMI and BSA Data Body Mass Index Body Surface Area  
 29.48 kg/m 2 2.1 m 2 Preferred Pharmacy Pharmacy Name Phone [place] [address] [facility] [phone] Your Updated Medication List  
  
   
This list is accurate as of: 10/16/17  1:55 PM.  Always use your most recent med list.  
  
  
  
  
 aspirin 81 mg tablet Take 1 Tab by mouth daily. atorvastatin 40 mg tablet Commonly known as:  LIPITOR  
TAKE 1 TABLET EVERY DAY  
  
 cholecalciferol 1,000 unit Cap Commonly known as:  VITAMIN D3 Take  by mouth as needed. meclizine 25 mg tablet Commonly known as:  ANTIVERT Take 0.5-1 tab by mouth 3 times daily as needed for vertigo. metoprolol tartrate 50 mg tablet Commonly known as:  LOPRESSOR  
TAKE 1 TABLET TWICE A DAY  
  
 nitroglycerin 0.4 mg SL tablet Commonly known as:  NITROSTAT  
1 Tab by SubLINGual route every five (5) minutes as needed for up to 3 doses. OCUVITE tablet Generic drug:  vitamin a-vitamin c-vit e-min Take 1 Tab by mouth daily. Prescriptions Sent to Pharmacy Refills  
 meclizine (ANTIVERT) 25 mg tablet 0 Sig: Take 0.5-1 tab by mouth 3 times daily as needed for vertigo.   
 Class: Normal  
 Pharmacy: [facility] [address] [facility] #: [phone] We Performed the Following ADMIN PNEUMOCOCCAL VACCINE [ HCP] INFLUENZA VIRUS VACCINE, HIGH DOSE SEASONAL, PRESERVATIVE FREE [86499 CPT(R)] Introducing [facility]! Neena Rucker introduces [employer] patient portal. Now you can access parts of your medical record, email your doctor's office, and request medication refills online. 1. In your internet browser, go to [email or web address] 2. Click on the First Time User? Click Here link in the Sign In box. You will see the New Member Sign Up page. 3. Enter your [employer] Access Code exactly as it appears below. You will not need to use this code after youve completed the sign-up process. If you do not sign up before the expiration date, you must request a new code. · [employer] Access Code: [email or web address] Expires: 12/31/2017  1:09 PM 
 
4. Enter the last four digits of your Social Security Number (xxxx) and Date of Birth (mm/dd/yyyy) as indicated and click Submit. You will be taken to the next sign-up page. 5. Create a [employer] ID. This will be your [employer] login ID and cannot be changed, so think of one that is secure and easy to remember. 6. Create a [employer] password. You can change your password at any time. 7. Enter your Password Reset Question and Answer. This can be used at a later time if you forget your password. 8. Enter your e-mail address. You will receive e-mail notification when new information is available in [address] 9. Click Sign Up. You can now view and download portions of your medical record. 10. Click the Download Summary menu link to download a portable copy of your medical information. If you have questions, please visit the Frequently Asked Questions section of the [employer] website. Remember, [employer] is NOT to be used for urgent needs. For medical emergencies, dial 911. Now available from your iPhone and Android! Please provide this summary of care documentation to your next provider. Your primary care clinician is listed as Tao Hyde. If you have any questions after today's visit, please call [phone].

## 2017-10-16 NOTE — PROGRESS NOTES
HPI/History  Misbah Leger is a 80 y.o.  male who presents for evaluation. Pt accompanied by wife. Pt reports intermittent mild brief vertigo over the last few days. Initially noticed when getting out of bed on day of onset. Vertigo provoked/worsened with bending over or other positional changes along with head movements. Slightly more constant today. Golf trip last week with no issues. Golfing yesterday as well without issue. Denies any other cardiopulmonary sxs including palpitations. HR a little low today but pt usually runs a little low or borderline; on lopressor. Followed by and recently saw Dr. Gibbons [place] with good reports. Pt states EKG was unremarkable and plans for regular surveillance stress test in January. Pt denies any other neurological/TIA/stroke-like sxs including no face or speech abnormalities, altered mentation, paresthesias, weakness, headaches, vision issues, or other sxs. No URI type sxs. No other sxs or complaints. Pt requesting flu shot.     Patient Active Problem List   Diagnosis Code    Impaired fasting glucose R73.01    Hyperlipidemia E78.5    Prostate ca [facility] [place] 7 s/p Roberta Care prostatectomy Dr Leisa Suarez 10/15 C61    Essential hypertension I10    Coronary artery disease involving native coronary artery without angina pectoris Dr Gibbons [place] I25.10    Advance directive discussed with patient Z71.89    History of coronary artery stent placement Z95.5     Past Medical History:   Diagnosis Date    Basal cell carcinoma     right arm Dr Kieran Bajwa CAD (coronary artery disease) 4/12    s/p stent LAD Dr. Seth Pitts Colon polyp     2007; Dr Emigdio Valdez 5/17    Elevated prostate specific antigen (PSA)     Essential hypertension     Frozen shoulder syndrome 2007    Dr. Kelby Leiva History of coronary artery stent placement 10/2/2017    3/12 LAD PCI    Hyperlipidemia     Hyperlipidemia     Hypertrophy of prostate with urinary obstruction and other lower urinary tract symptoms (LUTS)  IBS (irritable bowel syndrome)     Impaired fasting glucose 3/12    Obesity     peak weight 217 lbs, bmi 32 from 12/14    Phymatous rosacea     Prostate CA ([address]     T2C [place] 7 Dr Brown [facility] 8/15    Squamous cell cancer of skin of nose 2016    s/p Moh's Dr Obrien Axon Zoster 1980s     Past Surgical History:   Procedure Laterality Date    ABDOMEN SURGERY PROC UNLISTED  1988    inguinal hernia repair on left    HX COLONOSCOPY      Dr Geronimo Crystal 2007 polyp; Dr Geronimo Crystal 5/17 polyp    HX CORONARY STENT PLACEMENT  3/12    3/12 prox LAD AJITH    HX TONSILLECTOMY      HX UROLOGICAL  06/10    TURP    HX UROLOGICAL  05/10    TURP with biopsy 7/08, 5/10    HX UROLOGICAL  10/15    DaVinci prostatectomy Dr Casey Marsh in [address] [place] [address], NEEDLE, SATURATION SAMPLING      06/10, 5/10, 07/08, 8/15     Social History     Social History    Marital status:      Spouse name: N/A    Number of children: 3    Years of education: N/A     Occupational History    engr [employer]      Social History Main Topics    Smoking status: Never Smoker    Smokeless tobacco: Never Used    Alcohol use Yes      Comment: greater than 15-20 12-ounce beers/week    Drug use: No    Sexual activity: Not on file     Other Topics Concern    Not on file     Social History Narrative     Family History   Problem Relation Age of Onset    Heart Disease Mother     Lung Disease Father     Dementia Father     Stroke Paternal Grandmother      Current Outpatient Prescriptions   Medication Sig    meclizine (ANTIVERT) 25 mg tablet Take 0.5-1 tab by mouth 3 times daily as needed for vertigo.  metoprolol tartrate (LOPRESSOR) 50 mg tablet TAKE 1 TABLET TWICE A DAY    atorvastatin (LIPITOR) 40 mg tablet TAKE 1 TABLET EVERY DAY    nitroglycerin (NITROSTAT) 0.4 mg SL tablet 1 Tab by SubLINGual route every five (5) minutes as needed for up to 3 doses.  Cholecalciferol, Vitamin D3, 1,000 unit cap Take  by mouth as needed.     vitamin a-vitamin c-vit e-min (OCUVITE) tablet Take 1 Tab by mouth daily.  aspirin 81 mg tablet Take 1 Tab by mouth daily. No current facility-administered medications for this visit. No Known Allergies    Review of Systems  Aside from those included in HPI, remainder of complete ROS negative. Physical Examination  Visit Vitals    /80 (BP 1 Location: Right arm, BP Patient Position: Sitting)    Pulse (!) 51    Temp 98.1 °F (36.7 °C) (Oral)    Resp 14    Ht 5' 9\" (1.753 m)    Wt 199 lb 9.6 oz (90.5 kg)    SpO2 97%    BMI 29.48 kg/m2       General - Alert and in no acute distress. Pt appears well, comfortable, and in good spirits. Pleasant, engaging. Nontoxic. Not anxious, non-diaphoretic. Mental status - Appropriate mood, behavior, speech content, dress, and thought processes. Eyes - Pupils equal and reactive, extraocular movements intact. No erythema or discharge. Vision intact. Ears - Auditory canals appear normal.  TMs appear normal. Hearing intact. Nose - No erythema. No rhinorrhea. Mouth - Mucous membranes moist. Oropharynx unremarkable. Midline uvula, palate rise, and tongue protrusion. Normal phonation. Neck - Supple without rigidity. Lymph - No periauricular, perimandibular, or ant/post cervical tenderness or swelling. Pulm - No tachypnea, retractions, or cyanosis. Good respiratory effort. Clear to auscultation bilat. Cardiovascular - Bradycardic, regular rhythm. No appreciable murmurs or gallops. No carotid bruit noted. Neuromuscular - CN 2-12 intact. Good facial expressions. No speech abnormalities. Symmetric  strength with good dexterity. Full/symmetric U/LE strength. Clydell Bergman with mild reproduction bilat but no nystagmus noted. Ambulates well. No other significant findings. Assessment and Plan  1. Positional vertigo - Appears BPPV. Will use meclizine as needed and provided reference material for Epley and vertigo.  Med adverse effects and general precautionary measures discussed along with likely course and prognosis. 2. Bradycardia - pt usually borderline or low. On lopressor which is likely cause. Doubt to be the cause of above but pt will monitor BP and HR, especially during above times. Contact us or cardio if needed. 3. Flu shot given. Pt will return/call if needed or promptly visit ED if ominous developments as discussed. Otherwise, further planning as warranted. Pt and wife happily agree with plan. More than 25 mins spent during visit with more than 50% discussing above issues, potential causes/contributing factors, eval/tx, plan, and questions. PLEASE NOTE:   This document has been produced using voice recognition software. Unrecognized errors in transcription may be present.     [place]elena [place] [employer] [facility] [place]  [phone]  10/16/2017

## 2017-12-15 ENCOUNTER — HOSPITAL ENCOUNTER (OUTPATIENT)
Dept: LAB | Age: 72
Discharge: HOME | End: 2017-12-15
Payer: MEDICARE

## 2017-12-15 DIAGNOSIS — E78.00 PURE HYPERCHOLESTEROLEMIA: ICD-10-CM

## 2017-12-15 DIAGNOSIS — I10 ESSENTIAL HYPERTENSION: ICD-10-CM

## 2017-12-15 LAB
ALBUMIN SERPL-MCNC: 4.5 G/DL (ref 3.4–5)
ALBUMIN SERPL-MCNC: 4.6 G/DL (ref 3.4–5)
ALBUMIN/GLOB SERPL: 1.8 {RATIO} (ref 0.8–1.7)
ALBUMIN/GLOB SERPL: 1.8 {RATIO} (ref 0.8–1.7)
ALP SERPL-CCNC: 63 U/L (ref 45–117)
ALP SERPL-CCNC: 65 U/L (ref 45–117)
ALT SERPL-CCNC: 28 U/L (ref 16–61)
ALT SERPL-CCNC: 30 U/L (ref 16–61)
ANION GAP SERPL CALC-SCNC: 8 MMOL/L (ref 3–18)
AST SERPL-CCNC: 19 U/L (ref 15–37)
AST SERPL-CCNC: 19 U/L (ref 15–37)
BILIRUB DIRECT SERPL-MCNC: 0.2 MG/DL (ref 0–0.2)
BILIRUB SERPL-MCNC: 0.9 MG/DL (ref 0.2–1)
BILIRUB SERPL-MCNC: 1 MG/DL (ref 0.2–1)
BUN SERPL-MCNC: 15 MG/DL (ref 7–18)
BUN/CREAT SERPL: 16 (ref 12–20)
CALCIUM SERPL-MCNC: 9.2 MG/DL (ref 8.5–10.1)
CHLORIDE SERPL-SCNC: 105 MMOL/L (ref 100–108)
CHOLEST SERPL-MCNC: 127 MG/DL
CO2 SERPL-SCNC: 29 MMOL/L (ref 21–32)
CREAT SERPL-MCNC: 0.93 MG/DL (ref 0.6–1.3)
ERYTHROCYTE [DISTWIDTH] IN BLOOD BY AUTOMATED COUNT: 13.5 % (ref 11.6–14.5)
GLOBULIN SER CALC-MCNC: 2.5 G/DL (ref 2–4)
GLOBULIN SER CALC-MCNC: 2.6 G/DL (ref 2–4)
GLUCOSE SERPL-MCNC: 93 MG/DL (ref 74–99)
HCT VFR BLD AUTO: 46.2 % (ref 36–48)
HDLC SERPL-MCNC: 71 MG/DL (ref 40–60)
HDLC SERPL: 1.8 {RATIO} (ref 0–5)
HGB BLD-MCNC: 14.9 G/DL (ref 13–16)
LDLC SERPL CALC-MCNC: 43.4 MG/DL (ref 0–100)
LIPID PROFILE,FLP: ABNORMAL
MCH RBC QN AUTO: 32 PG (ref 24–34)
MCHC RBC AUTO-ENTMCNC: 32.3 G/DL (ref 31–37)
MCV RBC AUTO: 99.1 FL (ref 74–97)
PLATELET # BLD AUTO: 181 K/UL (ref 135–420)
PMV BLD AUTO: 11.4 FL (ref 9.2–11.8)
POTASSIUM SERPL-SCNC: 4.8 MMOL/L (ref 3.5–5.5)
PROT SERPL-MCNC: 7 G/DL (ref 6.4–8.2)
PROT SERPL-MCNC: 7.2 G/DL (ref 6.4–8.2)
RBC # BLD AUTO: 4.66 M/UL (ref 4.7–5.5)
SODIUM SERPL-SCNC: 142 MMOL/L (ref 136–145)
TRIGL SERPL-MCNC: 63 MG/DL (ref ?–150)
VLDLC SERPL CALC-MCNC: 12.6 MG/DL
WBC # BLD AUTO: 5.3 K/UL (ref 4.6–13.2)

## 2017-12-15 PROCEDURE — 80076 HEPATIC FUNCTION PANEL: CPT | Performed by: INTERNAL MEDICINE

## 2017-12-15 PROCEDURE — 36415 COLL VENOUS BLD VENIPUNCTURE: CPT | Performed by: INTERNAL MEDICINE

## 2017-12-15 PROCEDURE — 85027 COMPLETE CBC AUTOMATED: CPT | Performed by: INTERNAL MEDICINE

## 2017-12-15 PROCEDURE — 80061 LIPID PANEL: CPT | Performed by: INTERNAL MEDICINE

## 2017-12-15 PROCEDURE — 80053 COMPREHEN METABOLIC PANEL: CPT | Performed by: INTERNAL MEDICINE

## 2017-12-17 PROBLEM — E66.3 OVERWEIGHT (BMI 25.0-29.9): Status: ACTIVE | Noted: 2017-12-17

## 2017-12-18 ENCOUNTER — HOSPITAL ENCOUNTER (OUTPATIENT)
Dept: LAB | Age: 72
Discharge: HOME | End: 2017-12-18
Payer: MEDICARE

## 2017-12-18 ENCOUNTER — OFFICE VISIT (OUTPATIENT)
Dept: UROLOGY | Age: 72
End: 2017-12-18

## 2017-12-18 VITALS
HEART RATE: 58 BPM | SYSTOLIC BLOOD PRESSURE: 123 MMHG | WEIGHT: 191 LBS | DIASTOLIC BLOOD PRESSURE: 70 MMHG | HEIGHT: 69 IN | BODY MASS INDEX: 28.29 KG/M2 | OXYGEN SATURATION: 97 %

## 2017-12-18 DIAGNOSIS — C61 PROSTATE CANCER (HCC): Primary | ICD-10-CM

## 2017-12-18 DIAGNOSIS — C61 PROSTATE CANCER (HCC): ICD-10-CM

## 2017-12-18 LAB
BILIRUB UR QL STRIP: NORMAL
GLUCOSE UR-MCNC: NEGATIVE MG/DL
KETONES P FAST UR STRIP-MCNC: NEGATIVE MG/DL
PH UR STRIP: 6 [PH] (ref 4.6–8)
PROT UR QL STRIP: NEGATIVE
PSA SERPL-MCNC: <0.1 NG/ML (ref 0–4)
SP GR UR STRIP: 1.02 (ref 1–1.03)
UA UROBILINOGEN AMB POC: NORMAL (ref 0.2–1)
URINALYSIS CLARITY POC: CLEAR
URINALYSIS COLOR POC: YELLOW
URINE BLOOD POC: NEGATIVE
URINE LEUKOCYTES POC: NEGATIVE
URINE NITRITES POC: NEGATIVE

## 2017-12-18 PROCEDURE — 84153 ASSAY OF PSA TOTAL: CPT | Performed by: UROLOGY

## 2017-12-18 NOTE — PROGRESS NOTES
This is a subsequent Medicare Annual Wellness Exam     I have reviewed the patient's medical history in detail and updated the computerized patient record. History     Past Medical History:   Diagnosis Date    Basal cell carcinoma     right arm Dr Bonnie Simmons CAD (coronary artery disease) 4/12    s/p stent LAD Dr. Geraldine Stevenson Colon polyp     2007; Dr Andrew Mars 5/17    Essential hypertension     Frozen shoulder syndrome 2007    Dr. Akins Genin History of coronary artery stent placement 10/2/2017    3/12 LAD PCI    Hyperlipidemia     Hypertrophy of prostate with urinary obstruction and other lower urinary tract symptoms (LUTS)     IBS (irritable bowel syndrome)     Impaired fasting glucose 3/12    Obesity     peak weight 217 lbs, bmi 32 from 12/14    Phymatous rosacea     Prostate CA ([facility]     T2C [place] 7 Dr Calle Level 8/15    Squamous cell cancer of skin of nose 2016    s/p Moh's Dr Bonnie Simmons Zoster 1980s      Past Surgical History:   Procedure Laterality Date    ABDOMEN SURGERY PROC UNLISTED  1988    inguinal hernia repair on left    HX COLONOSCOPY      Dr Andrew Mars 2007 polyp; Dr Andrew Mars 5/17 polyp    HX CORONARY STENT PLACEMENT  3/12    3/12 prox LAD AJITH    HX TONSILLECTOMY      HX UROLOGICAL  06/10    TURP    HX UROLOGICAL  05/10    TURP with biopsy 7/08, 5/10    HX UROLOGICAL  10/15    DaVinci prostatectomy Dr Jose Estrada in [address] [place] [address], NEEDLE, SATURATION SAMPLING      06/10, 5/10, 07/08, 8/15     Current Outpatient Prescriptions   Medication Sig Dispense Refill    metoprolol tartrate (LOPRESSOR) 50 mg tablet TAKE 1 TABLET TWICE A  Tab 3    atorvastatin (LIPITOR) 40 mg tablet TAKE 1 TABLET EVERY DAY 90 Tab 3    nitroglycerin (NITROSTAT) 0.4 mg SL tablet 1 Tab by SubLINGual route every five (5) minutes as needed for up to 3 doses. 25 Tab 0    Cholecalciferol, Vitamin D3, 1,000 unit cap Take  by mouth as needed.       vitamin a-vitamin c-vit e-min (OCUVITE) tablet Take 1 Tab by mouth daily.  aspirin 81 mg tablet Take 1 Tab by mouth daily. 90 Tab 3    meclizine (ANTIVERT) 25 mg tablet Take 0.5-1 tab by mouth 3 times daily as needed for vertigo. 27 Tab 0     No Known Allergies  Family History   Problem Relation Age of Onset    Heart Disease Mother     Lung Disease Father     Dementia Father     Stroke Paternal Grandmother      Social History   Substance Use Topics    Smoking status: Never Smoker    Smokeless tobacco: Never Used    Alcohol use Yes      Comment: greater than 15-20 12-ounce beers/week     Depression Risk Factor Screening:     PHQ over the last two weeks 12/22/2017   Little interest or pleasure in doing things Not at all   Feeling down, depressed or hopeless Not at all   Total Score PHQ 2 0     SCREENINGS  Colonoscopy last done 5/17 Dr Jose Barahona 2017    Immunization History   Administered Date(s) Administered    Influenza High Dose Vaccine PF 12/06/2016, 10/16/2017    Influenza Vaccine PF 10/31/2014    Influenza Vaccine Whole 01/01/2010    Pneumococcal Conjugate (PCV-13) 06/09/2014    Pneumococcal Polysaccharide (PPSV-23) 10/31/2014    TD Vaccine 06/23/2009    ZZZ-RETIRED (DO NOT USE) Pneumococcal Vaccine (Unspecified Type) 01/01/2009    Zoster Vaccine, Live 01/01/2011     Alcohol Risk Factor Screening: On any occasion during the past 3 months, have you had more than 4 drinks containing alcohol? No    Do you average more than 14 drinks per week? No    Functional Ability and Level of Safety:     Hearing Loss   normal-to-mild    Vision - no acute complaints and is followed by Dr Nick Hunter [facility]   Self-care. Requires assistance with: no ADLs    Fall Risk     Fall Risk Assessment, last 12 mths 12/22/2017   Able to walk? Yes   Fall in past 12 months?  No     Abuse Screen   Patient is not abused    Review of Systems   A comprehensive review of systems was negative except for that written in the HPI.    Physical Examination     Visit Vitals    /76 (BP 1 Location: Right arm, BP Patient Position: Sitting)    Pulse 68    Temp 98.7 °F (37.1 °C) (Oral)    Resp 14    Ht 5' 9\" (1.753 m)    Wt 188 lb 9.6 oz (85.5 kg)    SpO2 97%    BMI 27.85 kg/m2     Evaluation of Cognitive Function:  Mood/affect:  happy  Appearance: age appropriate, overweight, within normal Limits and younger than stated age  Family member/caregiver input: na    Patient Care Team:  Isela Owusu MD as PCP - General (Internal Medicine)  Maricruz Cooper MD as Physician (Urology)  Joseph Mcbride MD (Urology)  Lynne Wesley MD as Physician (Urology)    Advice/Referrals/Counseling   Education and counseling provided:  Are appropriate based on today's review and evaluation  End-of-Life planning (with patient's consent)  Pneumococcal Vaccine  Influenza Vaccine  Prostate cancer screening tests (PSA, covered annually)  Colorectal cancer screening tests  Cardiovascular screening blood test  Diabetes screening test    Assessment/Plan       ICD-10-CM ICD-9-CM    1. Medicare annual wellness visit, subsequent Z00.00 V70.0    2. Advanced directives, counseling/discussion Z71.89 V65.49 ADVANCE CARE PLANNING FIRST 30 MINS   3. Screening for alcoholism Z13.89 V79.1 PA ANNUAL ALCOHOL SCREEN 15 MIN   4. Screening for depression Z13.89 V79.0 DEPRESSION SCREEN ANNUAL   5. Screen for colon cancer Z12.11 V76.51    6. Screening for diabetes mellitus Z13.1 V77.1    7. Screening for ischemic heart disease Z13.6 V81.0    8. Prostate ca [facility] [place] 7 s/p Devonte Bee prostatectomy Dr Cat Neal 10/15 C61 185    9. Overweight (BMI 25.0-29. 9) E66.3 278.02    10. History of coronary artery stent placement Z95.5 V45.82    11. Advance directive discussed with patient Z71.89 V65.49    12. Essential hypertension I10 401.9    13. Coronary artery disease involving native coronary artery of native heart without angina pectoris I25.10 414.01    14.  Impaired fasting glucose R73.01 790.21 HEMOGLOBIN A1C W/O EAG   15. Pure hypercholesterolemia E78.00 272.0 CBC W/O DIFF      LIPID PANEL      METABOLIC PANEL, COMPREHENSIVE     current treatment plan is effective, no change in therapy  lab results and schedule of future lab studies reviewed with patient  reviewed diet, exercise and weight control  cardiovascular risk and specific lipid/LDL goals reviewed. End of life discussion undertaken.   He will work on getting medical directive in place  he will get tdap outside  Colonoscopy to be scheduled 2027

## 2017-12-18 NOTE — PROGRESS NOTES
RBV. Per Dr. Stephanie Rios lab drawn in office today for PSA for prostate cancer. Mr. Ellington Like has a reminder for a \"due or due soon\" health maintenance. I have asked that he contact his primary care provider for follow-up on this health maintenance.

## 2017-12-18 NOTE — PROGRESS NOTES
71 y.o. WHITE OR  male who presents for f/u    Denies any cardiovascular complaints and he sees Dr. Juan Griffin regularly. He walks, plays golf and table tennis, outside chores no problem    Denies polyuria, polydipsia, nocturia, vision change. Not checking sugars at this time, weight is down as below, doing a better job     Vitals 12/22/2017 12/18/2017 10/16/2017 10/2/2017 6/19/2017   Weight 188 lb 9.6 oz 191 lb 199 lb 9.6 oz 199 lb 197 lb     He underwent DaVinci prostatectomy by Dr Sandra Salazar in [facility] 10/15.   Dr Santosh Knapp is following and no problems there     No gi complaints    He sees Dr Clifton Patel and has been to [facility] eye also    [address]: 6/12/15, 6/20/16, 12/22/17    ACP 6/20/16, 12/22/17    Past Medical History:   Diagnosis Date    Basal cell carcinoma     right arm Dr Spike Chandler CAD (coronary artery disease) 4/12    s/p stent LAD Dr. Bharat Benítez Colon polyp     2007; Dr Melody Moody 5/17    Essential hypertension     Frozen shoulder syndrome 2007    Dr. Pooja South History of coronary artery stent placement 10/2/2017    3/12 LAD PCI    Hyperlipidemia     Hypertrophy of prostate with urinary obstruction and other lower urinary tract symptoms (LUTS)     IBS (irritable bowel syndrome)     Impaired fasting glucose 3/12    Obesity     peak weight 217 lbs, bmi 32 from 12/14    Phymatous rosacea     Prostate CA ([facility])     T2C [place] 7 Dr Santosh Knapp 8/15    Squamous cell cancer of skin of nose 2016    s/p Moh's Dr Spkie Chandler Zoster 1980s     Past Surgical History:   Procedure Laterality Date    ABDOMEN SURGERY PROC UNLISTED  1988    inguinal hernia repair on left    HX COLONOSCOPY      Dr Melody Moody 2007 polyp; Dr Melody Moody 5/17 polyp    HX CORONARY STENT PLACEMENT  3/12    3/12 prox LAD AJITH    HX TONSILLECTOMY      HX UROLOGICAL  06/10    TURP    HX UROLOGICAL  05/10    TURP with biopsy 7/08, 5/10    HX UROLOGICAL  10/15    DaVinci prostatectomy Dr Florentino Argueta in [address] [place] [address], NEEDLE, SATURATION SAMPLING      06/10, 5/10, 07/08, 8/15     Social History     Social History    Marital status:      Spouse name: N/A    Number of children: 3    Years of education: N/A     Occupational History    engr       Social History Main Topics    Smoking status: Never Smoker    Smokeless tobacco: Never Used    Alcohol use Yes      Comment: greater than 15-20 12-ounce beers/week    Drug use: No    Sexual activity: Not on file     Other Topics Concern    Not on file     Social History Narrative     Current Outpatient Prescriptions on File Prior to Visit   Medication Sig Dispense Refill    metoprolol tartrate (LOPRESSOR) 50 mg tablet TAKE 1 TABLET TWICE A  Tab 3    atorvastatin (LIPITOR) 40 mg tablet TAKE 1 TABLET EVERY DAY 90 Tab 3    nitroglycerin (NITROSTAT) 0.4 mg SL tablet 1 Tab by SubLINGual route every five (5) minutes as needed for up to 3 doses. 25 Tab 0    Cholecalciferol, Vitamin D3, 1,000 unit cap Take  by mouth as needed.  vitamin a-vitamin c-vit e-min (OCUVITE) tablet Take 1 Tab by mouth daily.  aspirin 81 mg tablet Take 1 Tab by mouth daily. 90 Tab 3    meclizine (ANTIVERT) 25 mg tablet Take 0.5-1 tab by mouth 3 times daily as needed for vertigo. 30 Tab 0     No current facility-administered medications on file prior to visit.       No Known Allergies    REVIEW OF SYSTEMS: colo 5/17 Dr Raman Ribera, sees Dr Vu Barone  no vision change or eye pain  Oral  no mouth pain, tongue or tooth problems  Ears  no hearing loss, ear pain, fullness, no swallowing problems  Cardiac  no CP, PND, orthopnea, edema, palpitations or syncope  Chest  no breast masses  Resp  no wheezing, chronic coughing, dyspnea  GI  no heartburn, nausea, vomiting, change in bowel habits, bleeding, hemorrhoids  Urinary  no dysuria, hematuria, flank pain, urgency, frequency  Psych  denies any anxiety or depression symptoms, no hallucinations or violent ideation  Constitutional  no wt loss, night sweats, unexplained fevers  Neuro  no focal weakness, numbness, paresthesias, incoordination, ataxia, involuntary movements  Endo - no polyuria, polydipsia, nocturia, hot flashes    Visit Vitals    /76 (BP 1 Location: Right arm, BP Patient Position: Sitting)    Pulse 68    Temp 98.7 °F (37.1 °C) (Oral)    Resp 14    Ht 5' 9\" (1.753 m)    Wt 188 lb 9.6 oz (85.5 kg)    SpO2 97%    BMI 27.85 kg/m2     Affect is appropriate. Mood stable  No apparent distress  HEENT --Anicteric sclerae. No thyromegaly, JVD, or bruits. Lungs --Clear to auscultation and percussion, normal percussion. Heart --Regular rate and rhythm, no murmurs, rubs, gallops, or clicks. Abdomen -- Soft and nontender, no hepatosplenomegaly or masses. Extremities -- Without cyanosis, clubbing, edema. 2+ pulses equally and bilaterally.     LABS  From 5/10 showed   gluc 94,   cr 0.90, gfr>60,           wbc 5.4, hb 14.7, plt 185  From 6/10 showed   gluc 107, cr 1.00  From 2/12 showed                                   psa 5.5  From 4/13 showed                                     psa 5.7  From 6/13 showed   gluc 112, cr 0.89, gfr 89,   alt 14,           chol 132, tg 64, hdl 46, ldl-c 73, wbc 5.9, hb 14.2, plt 208, ua neg  From 11/13 showed                    hba1c 5.7, chol 120, tg 48, hdl 47, ldl-c 63  From 6/14 showed         hba1c 5.8, chol 130, tg 54, hdl 51, ldl-c 68, wbc 5.8, hb 14.5, plt 177  From 12/14 showed gluc 117, cr 0.80, gfr>60,  alt 19  hba1c 5.9, chol 138, tg 66, hdl 52, ldl-c 73  From 6/15 showed   gluc 104, cr 0.87, gfr>60,     hba1c 5.7,      wbc 4.8, hb 14.2, plt 161, psa 6.1  From 9/15 showed   gluc 84,   cr 1.01, gfr 76,   alt 34,       wbc 6.1, hb 13.9, plt 199, ua neg pro, inr 1.0 ptt 28, urine cx neg, ast 24  From 12/15 showed gluc 110, cr 0.97, gfr>60,    hba1c 5.6, chol 130, tg 58, hdl 59, ldl-c 59, wbc 6.0, hb 13.9, plt 197, psa<0.1  From 6/16 showed   gluc 113, cr 0.91, gfr>60,     hba1c 5.6   From 12/16 showed                     psa<0.1  From 6/17 showed                     psa<0.1  From 12/17 showed gluc 93,   cr 0.93, gfr>60,  alt 30,           chol 127, tg 63, hdl 71, ldl-c 43, wbc 5.3, hb 16.9, plt 181    Patient Active Problem List   Diagnosis Code    Impaired fasting glucose R73.01    Hyperlipidemia E78.5    Prostate ca [facility] [place] 7 s/p Arleta Nickel prostatectomy Dr Espinoza Henderson 10/15 C61    Essential hypertension I10    Coronary artery disease involving native coronary artery without angina pectoris Dr Srinath Canseco I25.10    Advance directive discussed with patient Z71.89    History of coronary artery stent placement Z95.5    Overweight (BMI 25.0-29. 9) E66.3     Assessment and plan:  1. Prostate ca. Per Dr Sonam De La Paz  2. IFG. Lifestyle and dietary measures   3. HLP. Continue statin  4. CHD. F/U Dr. Srinath Canseco, continue current  5. HTN. Continue current regimen. 6. Overweight. Lifestyle and dietary measures. Portion control reiterated. RTC 12/18    Above conditions discussed at length and patient vocalized understanding.   All questions answered to patient satifaction

## 2017-12-18 NOTE — ACP (ADVANCE CARE PLANNING)
Advance Care Planning    Advance Care Planning (ACP) Provider Note - Comprehensive     Date of ACP Conversation: 12/22/17  Persons included in Conversation:  patient  Length of ACP Conversation in minutes:  16 minutes    Authorized Decision Maker (if patient is incapable of making informed decisions): This person is: wife  Other Legally Authorized Decision Maker (e.g. Next of Kin)          General ACP for ALL Patients with Decision Making Capacity:   Importance of advance care planning, including choosing a healthcare agent to communicate patient's healthcare decisions if patient lost the ability to make decisions, such as after a sudden illness or accident  Understanding of the healthcare agent role was assessed and information provided  Exploration of values, goals, and preferences if recovery is not expected, even with continued medical treatment in the event of: Imminent death  Severe, permanent brain injury    Review of Existing Advance Directive:  na    For Serious or Chronic Illness:  Understanding of medical condition    Understanding of CPR, goals and expected outcomes, benefits and burdens discussed.     Interventions Provided:  Recommended completion of Advance Directive form after review of ACP materials and conversation with prospective healthcare agent   Recommended communicating the plan and making copies for the healthcare agent, personal physician, and others as appropriate (e.g., health system)  Recommended review of completed ACP document annually or upon change in health status

## 2017-12-18 NOTE — PATIENT INSTRUCTIONS

## 2017-12-18 NOTE — MR AVS SNAPSHOT
Visit Information Date & Time Provider Department Dept. Phone Encounter #  
 12/18/2017  9:15 AM Tia Prieto, [address] [facility] [phone] [id number] Your Appointments 12/22/2017  1:00 PM  
PHYSICAL with Deng Patton MD  
[facility] [address]) Appt Note: rpe  
 [address] [facility], [address] [address] [address]  
  
   
 [address], [address]  
  
    
 1/8/2018  7:30 AM  
PROCEDURE with [place] [facility] [place] ([address]) Appt Note: Est/Guerrero/Same day follow up  
 [address] [place] [address]  
  
   
 [address] [address] [address] [address] Upcoming Health Maintenance Date Due DTaP/Tdap/Td series (1 - Tdap) 6/24/2009 MEDICARE YEARLY EXAM 12/23/2018 GLAUCOMA SCREENING Q2Y 5/10/2019 COLONOSCOPY 5/9/2027 Allergies as of 12/18/2017  Review Complete On: 12/18/2017 By: Mercy Ya LPN No Known Allergies Current Immunizations  Reviewed on 6/17/2016 Name Date Influenza High Dose Vaccine PF 10/16/2017, 12/6/2016 Influenza Vaccine PF 10/31/2014  2:55 PM  
 Influenza Vaccine Whole 1/1/2010 Pneumococcal Conjugate (PCV-13) 6/9/2014 Pneumococcal Polysaccharide (PPSV-23) 10/31/2014  2:57 PM  
 TD Vaccine 6/23/2009 ZZZ-RETIRED (DO NOT USE) Pneumococcal Vaccine (Unspecified Type) 1/1/2009 Zoster Vaccine, Live 1/1/2011 Not reviewed this visit You Were Diagnosed With   
  
 Codes Comments Prostate cancer [facility])    -  Primary ICD-10-CM: L64 ICD-9-CM: 431 Vitals BP Pulse Height(growth percentile) Weight(growth percentile) SpO2 BMI  
 123/70 (BP 1 Location: Left arm, BP Patient Position: Sitting) (!) 58 5' 9\" (1.753 m) 191 lb (86.6 kg) 97% 28.21 kg/m2 Smoking Status Never Smoker Vitals History BMI and BSA Data Body Mass Index Body Surface Area  
 28.21 kg/m 2 2.05 m 2 Preferred Pharmacy Pharmacy Name Phone [facility] [address] [phone] Your Updated Medication List  
  
   
This list is accurate as of: 12/18/17 10:27 AM.  Always use your most recent med list.  
  
  
  
  
 aspirin 81 mg tablet Take 1 Tab by mouth daily. atorvastatin 40 mg tablet Commonly known as:  LIPITOR  
TAKE 1 TABLET EVERY DAY  
  
 cholecalciferol 1,000 unit Cap Commonly known as:  VITAMIN D3 Take  by mouth as needed. meclizine 25 mg tablet Commonly known as:  ANTIVERT Take 0.5-1 tab by mouth 3 times daily as needed for vertigo. metoprolol tartrate 50 mg tablet Commonly known as:  LOPRESSOR  
TAKE 1 TABLET TWICE A DAY  
  
 nitroglycerin 0.4 mg SL tablet Commonly known as:  NITROSTAT  
1 Tab by SubLINGual route every five (5) minutes as needed for up to 3 doses. OCUVITE tablet Generic drug:  vitamin a-vitamin c-vit e-min Take 1 Tab by mouth daily. We Performed the Following AMB POC URINALYSIS DIP STICK AUTO W/O MICRO [19792 CPT(R)] [place] COLLECTION VENOUS BLOOD,VENIPUNCTURE [id number] CPT(R)] To-Do List   
 12/18/2017 Lab:  PSA, DIAGNOSTIC (PROSTATE SPECIFIC AG) Patient Instructions Prostate-Specific Antigen (PSA) Test: About This Test 
What is it? A prostate-specific antigen (PSA) test measures the amount of PSA in your blood. PSA is released by a man's prostate gland into his blood. A high PSA level may mean that you have an enlargement, infection, or cancer of the prostate. Why is this test done? You may have this test to: · Check for prostate cancer. · Watch prostate cancer and see if treatment is working. How can you prepare for the test? 
Do not ejaculate during the 2 days before your PSA blood test, either during sex or masturbation.  
What happens before the test? 
 Tell your doctor if you have had a: 
· Test to look at your bladder (cystoscopy) in the past several weeks. · Prostate biopsy in the past several weeks. · Prostate infection or urinary tract infection that has not gone away. · Tube (catheter) inserted into your bladder to drain urine recently. What happens during the test? 
A health professional takes a sample of your blood. What happens after the test? 
You can go back to your usual activities right away. When should you call for help? Watch closely for changes in your health, and be sure to contact your doctor if you have any questions about this test. 
Follow-up care is a key part of your treatment and safety. Be sure to make and go to all appointments, and call your doctor if you are having problems. It's also a good idea to keep a list of the medicines you take. Ask your doctor when you can expect to have your test results. Where can you learn more? Go to http://tere-bonifacio.info/. Enter K308 in the search box to learn more about \"Prostate-Specific Antigen (PSA) Test: About This Test.\" Current as of: May 12, 2017 Content Version: 11.4 © 7017-0310 Healthwise, Incorporated. Care instructions adapted under license by [employer] (which disclaims liability or warranty for this information). If you have questions about a medical condition or this instruction, always ask your healthcare professional. [address] any warranty or liability for your use of this information. Introducing \[facility]\"" & HEALTH SERVICES! [facility] introduces myJambi patient portal. Now you can access parts of your medical record, email your doctor's office, and request medication refills online. 1. In your internet browser, go to [email or web address] 2. Click on the First Time User? Click Here link in the Sign In box. You will see the New Member Sign Up page. 3. Enter your Nolio Access Code exactly as it appears below. You will not need to use this code after youve completed the sign-up process. If you do not sign up before the expiration date, you must request a new code. · Nolio Access Code: [email or web address] Expires: 12/31/2017 12:09 PM 
 
4. Enter the last four digits of your Social Security Number (xxxx) and Date of Birth (mm/dd/yyyy) as indicated and click Submit. You will be taken to the next sign-up page. 5. Create a Nolio ID. This will be your Nolio login ID and cannot be changed, so think of one that is secure and easy to remember. 6. Create a Nolio password. You can change your password at any time. 7. Enter your Password Reset Question and Answer. This can be used at a later time if you forget your password. 8. Enter your e-mail address. You will receive e-mail notification when new information is available in [address]. 9. Click Sign Up. You can now view and download portions of your medical record. 10. Click the Download Summary menu link to download a portable copy of your medical information. If you have questions, please visit the Frequently Asked Questions section of the Nolio website. Remember, Nolio is NOT to be used for urgent needs. For medical emergencies, dial 911. Now available from your iPhone and Android! Please provide this summary of care documentation to your next provider. Your primary care clinician is listed as Teena Zhang. If you have any questions after today's visit, please call [phone].

## 2017-12-18 NOTE — PATIENT INSTRUCTIONS
Prostate-Specific Antigen (PSA) Test: About This Test  What is it? A prostate-specific antigen (PSA) test measures the amount of PSA in your blood. PSA is released by a man's prostate gland into his blood. A high PSA level may mean that you have an enlargement, infection, or cancer of the prostate. Why is this test done? You may have this test to:  · Check for prostate cancer. · Watch prostate cancer and see if treatment is working. How can you prepare for the test?  Do not ejaculate during the 2 days before your PSA blood test, either during sex or masturbation. What happens before the test?  Tell your doctor if you have had a:  · Test to look at your bladder (cystoscopy) in the past several weeks. · Prostate biopsy in the past several weeks. · Prostate infection or urinary tract infection that has not gone away. · Tube (catheter) inserted into your bladder to drain urine recently. What happens during the test?  A health professional takes a sample of your blood. What happens after the test?  You can go back to your usual activities right away. When should you call for help? Watch closely for changes in your health, and be sure to contact your doctor if you have any questions about this test.  Follow-up care is a key part of your treatment and safety. Be sure to make and go to all appointments, and call your doctor if you are having problems. It's also a good idea to keep a list of the medicines you take. Ask your doctor when you can expect to have your test results. Where can you learn more? Go to http://tere-bonifacio.info/. Enter O664 in the search box to learn more about \"Prostate-Specific Antigen (PSA) Test: About This Test.\"  Current as of: May 12, 2017  Content Version: 11.4  © 9320-2232 [employer]. Care instructions adapted under license by GHash.IO (which disclaims liability or warranty for this information).  If you have questions about a medical condition or this instruction, always ask your healthcare professional. [place] [address] any warranty or liability for your use of this information.

## 2017-12-18 NOTE — PROGRESS NOTES
Chief Complaint   Patient presents with    Prostate Cancer       HISTORY OF PRESENT ILLNESS:  Drake Okeefe is a 71 y.o. male who presents today in follow-up for a six-month PSA. In summary he is now a little over 2 years from a radical prostatectomy performed in [place] and is doing very well with that. Since his surgery, he has excellent urinary control and erections, while not as good as preoperatively, are still present and usable. His PSAs have all been negative. And he has no new complaints or problems so PSA is repeated today. He has no weight loss, back pain, or peripheral edema. No changes in urination. ROS documented on the chart refer to that for details.     Past Medical History:   Diagnosis Date    Basal cell carcinoma     right arm Dr Adrienne Coffman CAD (coronary artery disease) 4/12    s/p stent LAD Dr. Flako Youngblood Colon polyp     2007; Dr Alexandra Goodrich 5/17    Essential hypertension     Frozen shoulder syndrome 2007    Dr. Saint Hatcher History of coronary artery stent placement 10/2/2017    3/12 LAD PCI    Hyperlipidemia     Hypertrophy of prostate with urinary obstruction and other lower urinary tract symptoms (LUTS)     IBS (irritable bowel syndrome)     Impaired fasting glucose 3/12    Obesity     peak weight 217 lbs, bmi 32 from 12/14    Phymatous rosacea     Prostate CA (HCC)     T2C [place] 7 Dr Stevo Cherry 8/15    Squamous cell cancer of skin of nose 2016    s/p Moh's Dr Adrienne Coffman Zoster 1980s       Past Surgical History:   Procedure Laterality Date    ABDOMEN SURGERY PROC UNLISTED  1988    inguinal hernia repair on left    HX COLONOSCOPY      Dr Alexandra Goodrich 2007 polyp; Dr Alexandra Goodrich 5/17 polyp    HX CORONARY STENT PLACEMENT  3/12    3/12 prox LAD AJITH    HX TONSILLECTOMY      HX UROLOGICAL  06/10    TURP    HX UROLOGICAL  05/10    TURP with biopsy 7/08, 5/10    HX UROLOGICAL  10/15    DaVinci prostatectomy Dr Blank Yanez in [address], NEEDLE, SATURATION SAMPLING      06/10, 5/10, 07/08, 8/15       Social History   Substance Use Topics    Smoking status: Never Smoker    Smokeless tobacco: Never Used    Alcohol use Yes      Comment: greater than 15-20 12-ounce beers/week       No Known Allergies    Family History   Problem Relation Age of Onset    Heart Disease Mother     Lung Disease Father     Dementia Father     Stroke Paternal Grandmother        Current Outpatient Prescriptions   Medication Sig Dispense Refill    metoprolol tartrate (LOPRESSOR) 50 mg tablet TAKE 1 TABLET TWICE A  Tab 3    atorvastatin (LIPITOR) 40 mg tablet TAKE 1 TABLET EVERY DAY 90 Tab 3    nitroglycerin (NITROSTAT) 0.4 mg SL tablet 1 Tab by SubLINGual route every five (5) minutes as needed for up to 3 doses. 25 Tab 0    Cholecalciferol, Vitamin D3, 1,000 unit cap Take  by mouth as needed.  vitamin a-vitamin c-vit e-min (OCUVITE) tablet Take 1 Tab by mouth daily.  aspirin 81 mg tablet Take 1 Tab by mouth daily. 90 Tab 3    meclizine (ANTIVERT) 25 mg tablet Take 0.5-1 tab by mouth 3 times daily as needed for vertigo. 30 Tab 0         PHYSICAL EXAMINATION:   Visit Vitals    /70 (BP 1 Location: Left arm, BP Patient Position: Sitting)    Pulse (!) 58    Ht 5' 9\" (1.753 m)    Wt 191 lb (86.6 kg)    SpO2 97%    BMI 28.21 kg/m2     Constitutional: WDWN, Pleasant and appropriate affect, No acute distress. CV:  No peripheral swelling noted  Respiratory: No respiratory distress or difficulties  Abdomen:  No abdominal masses or tenderness. No CVA tenderness. No inguinal hernias noted.  Male:    Deferred today. Skin: No jaundice. Neuro/Psych:  Alert and oriented x 3, affect appropriate. Lymphatic:   No enlarged inguinal lymph nodes.          Results for orders placed or performed in visit on 12/18/17   AMB POC URINALYSIS DIP STICK AUTO W/O MICRO   Result Value Ref Range    Color (UA POC) Yellow     Clarity (UA POC) Clear     Glucose (UA POC) Negative Negative    Bilirubin (UA POC) 1+ Negative    Ketones (UA POC) Negative Negative    Specific gravity (UA POC) 1.025 1.001 - 1.035    Blood (UA POC) Negative Negative    pH (UA POC) 6.0 4.6 - 8.0    Protein (UA POC) Negative Negative    Urobilinogen (UA POC) 0.2 mg/dL 0.2 - 1    Nitrites (UA POC) Negative Negative    Leukocyte esterase (UA POC) Negative Negative         REVIEW OF LABS AND IMAGING:     Imaging Report Reviewed? NO     Images Reviewed? NO           Other Lab Data Reviewed? YES         ASSESSMENT:     ICD-10-CM ICD-9-CM    1. Prostate cancer (HCC) C61 185 AMB POC URINALYSIS DIP STICK AUTO W/O MICRO      PSA, DIAGNOSTIC (PROSTATE SPECIFIC AG)      UT COLLECTION VENOUS BLOOD,VENIPUNCTURE            PLAN / DISCUSSION: : 2 years post radical prostatectomy for localized T2 cancer of the prostate. PSAs have remained negative to date. Repeat today and return here in 6 months. The patient expresses understanding and agreement of the discussion and plan. Sherif Pulido MD on 12/18/2017         Please note: This document has been produced using voice recognition software. Unrecognized errors in transcription may be present.

## 2017-12-22 ENCOUNTER — TELEPHONE (OUTPATIENT)
Dept: INTERNAL MEDICINE CLINIC | Age: 72
End: 2017-12-22

## 2017-12-22 ENCOUNTER — OFFICE VISIT (OUTPATIENT)
Dept: INTERNAL MEDICINE CLINIC | Age: 72
End: 2017-12-22

## 2017-12-22 VITALS
SYSTOLIC BLOOD PRESSURE: 110 MMHG | BODY MASS INDEX: 27.93 KG/M2 | HEIGHT: 69 IN | TEMPERATURE: 98.7 F | RESPIRATION RATE: 14 BRPM | WEIGHT: 188.6 LBS | HEART RATE: 68 BPM | OXYGEN SATURATION: 97 % | DIASTOLIC BLOOD PRESSURE: 76 MMHG

## 2017-12-22 DIAGNOSIS — E66.3 OVERWEIGHT (BMI 25.0-29.9): ICD-10-CM

## 2017-12-22 DIAGNOSIS — I10 ESSENTIAL HYPERTENSION: ICD-10-CM

## 2017-12-22 DIAGNOSIS — Z95.5 HISTORY OF CORONARY ARTERY STENT PLACEMENT: ICD-10-CM

## 2017-12-22 DIAGNOSIS — Z71.89 ADVANCE DIRECTIVE DISCUSSED WITH PATIENT: ICD-10-CM

## 2017-12-22 DIAGNOSIS — Z00.00 MEDICARE ANNUAL WELLNESS VISIT, SUBSEQUENT: Primary | ICD-10-CM

## 2017-12-22 DIAGNOSIS — C61 PROSTATE CA (HCC): ICD-10-CM

## 2017-12-22 DIAGNOSIS — I25.10 CORONARY ARTERY DISEASE INVOLVING NATIVE CORONARY ARTERY OF NATIVE HEART WITHOUT ANGINA PECTORIS: ICD-10-CM

## 2017-12-22 DIAGNOSIS — Z13.39 SCREENING FOR ALCOHOLISM: ICD-10-CM

## 2017-12-22 DIAGNOSIS — R73.01 IMPAIRED FASTING GLUCOSE: ICD-10-CM

## 2017-12-22 DIAGNOSIS — Z13.6 SCREENING FOR ISCHEMIC HEART DISEASE: ICD-10-CM

## 2017-12-22 DIAGNOSIS — Z12.11 SCREEN FOR COLON CANCER: ICD-10-CM

## 2017-12-22 DIAGNOSIS — Z71.89 ADVANCED DIRECTIVES, COUNSELING/DISCUSSION: ICD-10-CM

## 2017-12-22 DIAGNOSIS — E78.00 PURE HYPERCHOLESTEROLEMIA: ICD-10-CM

## 2017-12-22 DIAGNOSIS — Z13.1 SCREENING FOR DIABETES MELLITUS: ICD-10-CM

## 2017-12-22 DIAGNOSIS — Z13.31 SCREENING FOR DEPRESSION: ICD-10-CM

## 2017-12-22 RX ORDER — ATORVASTATIN CALCIUM 40 MG/1
TABLET, FILM COATED ORAL
Qty: 90 TAB | Refills: 3 | Status: SHIPPED | OUTPATIENT
Start: 2017-12-22 | End: 2018-10-09 | Stop reason: SDUPTHER

## 2017-12-22 NOTE — PROGRESS NOTES
1. Have you been to the ER, urgent care clinic or hospitalized since your last visit? NO.     2. Have you seen or consulted any other health care providers outside of the [address] [place] [address] since your last visit (Include any pap smears or colon screening)? NO      Do you have an Advanced Directive? NO    Would you like information on Advanced Directives?  NO

## 2017-12-22 NOTE — MR AVS SNAPSHOT
Visit Information Date & Time Provider Department Dept. Phone Encounter #  
 12/22/2017  1:00 PM Mala Lebron MD [facility] [id number] Your Appointments 1/8/2018  7:30 AM  
PROCEDURE with [facility] [place] ([facility]) Appt Note: Est/Guerrero/Same day follow up  
 [address] [facility] [address]  
  
   
 [address] [place] [address]  
  
    
 6/4/2018  9:30 AM  
Office Visit with Precious Hawkins MD  
[facility]) Appt Note: check up [address] [address]  
[phone] [address] [address]  
  
    
 12/20/2018  8:25 AM  
LAB with [facility] NURSE VISIT [facility] ([facility]) Appt Note: labs [address], Suite [facility] [address] [place]  
  
   
 [address], [place]  
  
    
 12/27/2018  8:40 AM  
Office Visit with Mala Lebron MD  
[facility]) Appt Note: ov per rd  
 [address] [facility], [place] [address] [address] [place]  
  
   
 [address], [facility] Upcoming Health Maintenance Date Due  
 MEDICARE YEARLY EXAM 12/23/2018 GLAUCOMA SCREENING Q2Y 5/10/2019 COLONOSCOPY 5/9/2027 DTaP/Tdap/Td series (2 - Td) 12/22/2027 Allergies as of 12/22/2017  Review Complete On: 12/22/2017 By: Mala Lebron MD  
 No Known Allergies Current Immunizations  Reviewed on 6/17/2016 Name Date Influenza High Dose Vaccine PF 10/16/2017, 12/6/2016 Influenza Vaccine PF 10/31/2014  2:55 PM  
 Influenza Vaccine Whole 1/1/2010 Pneumococcal Conjugate (PCV-13) 6/9/2014 Pneumococcal Polysaccharide (PPSV-23) 10/31/2014  2:57 PM  
 TD Vaccine 6/23/2009 ZZZ-RETIRED (DO NOT USE) Pneumococcal Vaccine (Unspecified Type) 1/1/2009 Zoster Vaccine, Live 1/1/2011 Not reviewed this visit You Were Diagnosed With   
  
 Codes Comments Medicare annual wellness visit, subsequent    -  Primary ICD-10-CM: Z00.00 ICD-9-CM: V70.0 Advanced directives, counseling/discussion     ICD-10-CM: Z71.89 ICD-9-CM: V65.49 Screening for alcoholism     ICD-10-CM: Z13.89 ICD-9-CM: V79.1 Screening for depression     ICD-10-CM: Z13.89 ICD-9-CM: V79.0 Screen for colon cancer     ICD-10-CM: Z12.11 ICD-9-CM: V76.51 Screening for diabetes mellitus     ICD-10-CM: Z13.1 ICD-9-CM: V77.1 Screening for ischemic heart disease     ICD-10-CM: Z13.6 ICD-9-CM: V81.0 Prostate CA [facility])     ICD-10-CM: C32 ICD-9-CM: 030 Overweight (BMI 25.0-29. 9)     ICD-10-CM: L15.4 ICD-9-CM: 278.02 History of coronary artery stent placement     ICD-10-CM: Z95.5 ICD-9-CM: V45.82 Advance directive discussed with patient     ICD-10-CM: Z71.89 ICD-9-CM: V65.49 Essential hypertension     ICD-10-CM: I10 
ICD-9-CM: 401.9 Coronary artery disease involving native coronary artery of native heart without angina pectoris     ICD-10-CM: I25.10 ICD-9-CM: 414.01 Impaired fasting glucose     ICD-10-CM: R73.01 
ICD-9-CM: 790.21 Pure hypercholesterolemia     ICD-10-CM: E78.00 ICD-9-CM: 272.0 Vitals BP Pulse Temp Resp Height(growth percentile) Weight(growth percentile) 110/76 (BP 1 Location: Right arm, BP Patient Position: Sitting) 68 98.7 °F (37.1 °C) (Oral) 14 5' 9\" (1.753 m) 188 lb 9.6 oz (85.5 kg) SpO2 BMI Smoking Status 97% 27.85 kg/m2 Never Smoker Vitals History BMI and BSA Data Body Mass Index Body Surface Area  
 27.85 kg/m 2 2.04 m 2 Preferred Pharmacy Pharmacy Name Phone [facility] [phone] Your Updated Medication List  
  
   
 This list is accurate as of: 12/22/17  1:44 PM.  Always use your most recent med list.  
  
  
  
  
 aspirin 81 mg tablet Take 1 Tab by mouth daily. atorvastatin 40 mg tablet Commonly known as:  LIPITOR  
TAKE 1 TABLET EVERY DAY  
  
 cholecalciferol 1,000 unit Cap Commonly known as:  VITAMIN D3 Take  by mouth as needed. meclizine 25 mg tablet Commonly known as:  ANTIVERT Take 0.5-1 tab by mouth 3 times daily as needed for vertigo. metoprolol tartrate 50 mg tablet Commonly known as:  LOPRESSOR  
TAKE 1 TABLET TWICE A DAY  
  
 nitroglycerin 0.4 mg SL tablet Commonly known as:  NITROSTAT  
1 Tab by SubLINGual route every five (5) minutes as needed for up to 3 doses. OCUVITE tablet Generic drug:  vitamin a-vitamin c-vit e-min Take 1 Tab by mouth daily. We Performed the Following ADVANCE CARE PLANNING FIRST 30 MINS [39926 CPT(R)] [address] [[id number] [facility]] AL ANNUAL ALCOHOL SCREEN 15 MIN [id number] [facility]] To-Do List   
 06/21/2018 Lab:  CBC W/O DIFF   
  
 06/21/2018 Lab:  LIPID PANEL   
  
 06/21/2018 Lab:  METABOLIC PANEL, COMPREHENSIVE   
  
 06/23/2018 Lab:  HEMOGLOBIN A1C W/O EAG Patient Instructions Medicare Wellness Visit, Male The best way to live healthy is to have a healthy lifestyle by eating a well-balanced diet, exercising regularly, limiting alcohol and stopping smoking. Regular physical exams and screening tests are another way to keep healthy. Preventive exams provided by your health care provider can find health problems before they become diseases or illnesses. Preventive services including immunizations, screening tests, monitoring and exams can help you take care of your own health. All people over age 72 should have a pneumovax  and and a prevnar shot to prevent pneumonia. These are once in a lifetime unless you and your provider decide differently. All people over 65 should have a yearly flu shot and a tetanus vaccine every 10 years. Screening for diabetes mellitus with a blood sugar test should be done every year. Glaucoma is a disease of the eye due to increased ocular pressure that can lead to blindness and it should be done every year by an eye professional. 
 
Cardiovascular screening tests that check for elevated lipids (fatty part of blood) which can lead to heart disease and strokes should be done every 5 years. Colorectal screening that evaluates for blood or polyps in your colon should be done yearly as a stool test or every five years as a flexible sigmoidoscope or every 10 years as a colonoscopy up to age 76. Men up to age 76 may need a screening blood test for prostate cancer at certain intervals, depending on their personal and family history. This decision is between the patient and his provider. If you have been a smoker or had family history of abdominal aortic aneurysms, you and your provider may decide to schedule an ultrasound test of your aorta. Hepatitis C screening is also recommended for anyone born between [birth year] through [address]. A shingles vaccine is also recommended once in a lifetime after age 61. Your Medicare Wellness Exam is recommended annually. Here is a list of your current Health Maintenance items with a due date: 
Health Maintenance Due Topic Date Due  
 DTaP/Tdap/Td  (1 - Tdap) 06/24/2009 [employer] Annual Well Visit  06/21/2017 Introducing [facility] & HEALTH SERVICES! Annette Madison introduces Given.to patient portal. Now you can access parts of your medical record, email your doctor's office, and request medication refills online. 1. In your internet browser, go to [email or web address] 2. Click on the First Time User? Click Here link in the Sign In box. You will see the New Member Sign Up page. 3. Enter your Given.to Access Code exactly as it appears below.  You will not need to use this code after youve completed the sign-up process. If you do not sign up before the expiration date, you must request a new code. · [employer] Access Code: [email or web address] Expires: 12/31/2017 12:09 PM 
 
4. Enter the last four digits of your Social Security Number (xxxx) and Date of Birth (mm/dd/yyyy) as indicated and click Submit. You will be taken to the next sign-up page. 5. Create a [employer] ID. This will be your [employer] login ID and cannot be changed, so think of one that is secure and easy to remember. 6. Create a [employer] password. You can change your password at any time. 7. Enter your Password Reset Question and Answer. This can be used at a later time if you forget your password. 8. Enter your e-mail address. You will receive e-mail notification when new information is available in [address] 9. Click Sign Up. You can now view and download portions of your medical record. 10. Click the Download Summary menu link to download a portable copy of your medical information. If you have questions, please visit the Frequently Asked Questions section of the [employer] website. Remember, [employer] is NOT to be used for urgent needs. For medical emergencies, dial 911. Now available from your iPhone and Android! Please provide this summary of care documentation to your next provider. Your primary care clinician is listed as Emily Fuller. If you have any questions after today's visit, please call [phone].

## 2017-12-22 NOTE — TELEPHONE ENCOUNTER
----- Message from Isela Owusu MD sent at 12/22/2017  1:42 PM EST -----  Record [facility]

## 2017-12-26 ENCOUNTER — DOCUMENTATION ONLY (OUTPATIENT)
Dept: INTERNAL MEDICINE CLINIC | Age: 72
End: 2017-12-26

## 2018-01-08 ENCOUNTER — OFFICE VISIT (OUTPATIENT)
Dept: CARDIOLOGY CLINIC | Age: 73
End: 2018-01-08

## 2018-01-08 ENCOUNTER — CLINICAL SUPPORT (OUTPATIENT)
Dept: CARDIOLOGY CLINIC | Age: 73
End: 2018-01-08

## 2018-01-08 VITALS
BODY MASS INDEX: 27.7 KG/M2 | WEIGHT: 187 LBS | HEIGHT: 69 IN | HEART RATE: 78 BPM | DIASTOLIC BLOOD PRESSURE: 55 MMHG | SYSTOLIC BLOOD PRESSURE: 109 MMHG

## 2018-01-08 DIAGNOSIS — I25.10 CORONARY ARTERY DISEASE INVOLVING NATIVE CORONARY ARTERY OF NATIVE HEART WITHOUT ANGINA PECTORIS: Primary | ICD-10-CM

## 2018-01-08 DIAGNOSIS — I10 ESSENTIAL HYPERTENSION: ICD-10-CM

## 2018-01-08 DIAGNOSIS — E66.3 OVERWEIGHT (BMI 25.0-29.9): ICD-10-CM

## 2018-01-08 DIAGNOSIS — Z95.5 HISTORY OF CORONARY ARTERY STENT PLACEMENT: ICD-10-CM

## 2018-01-08 DIAGNOSIS — E78.00 PURE HYPERCHOLESTEROLEMIA: ICD-10-CM

## 2018-01-08 NOTE — PROGRESS NOTES
1. Have you been to the ER, urgent care clinic since your last visit? Hospitalized since your last visit? No    2. Have you seen or consulted any other health care providers outside of the [address] [place] [address] since your last visit? Include any pap smears or colon screening. Yes Where: PCP     3. Since your last visit, have you had any of the following symptoms? .          4. Have you had any blood work, X-rays or cardiac testing? Yes Where: PCP Office Reason for visit: Labs              5.  Where do you normally have your labs drawn? PCP Office    6. Do you need any refills today?    No

## 2018-01-08 NOTE — PROGRESS NOTES
HISTORY OF PRESENT ILLNESS  Silvia Ortega is a 71 y.o. male. HPI Comments: F/u CAD, old LAD PCI, HTN, HLD    Patient denies significant chest pain, SOB, palpitations, edema, dizziness      Hypertension   The history is provided by the medical records. This is a chronic problem. Pertinent negatives include no chest pain, no headaches and no shortness of breath. Cholesterol Problem   The history is provided by the medical records. This is a chronic problem. Pertinent negatives include no chest pain, no headaches and no shortness of breath. Review of Systems   Constitutional: Negative for chills, fever, malaise/fatigue and weight loss. HENT: Negative for nosebleeds. Eyes: Negative for discharge. Respiratory: Negative for cough, shortness of breath and wheezing. Cardiovascular: Negative for chest pain, palpitations, orthopnea, claudication, leg swelling and PND. Gastrointestinal: Negative for diarrhea, nausea and vomiting. Genitourinary: Negative for dysuria and hematuria. Musculoskeletal: Negative for joint pain. Skin: Negative for rash. Neurological: Negative for dizziness, seizures, loss of consciousness and headaches. Endo/Heme/Allergies: Negative for polydipsia. Does not bruise/bleed easily. Psychiatric/Behavioral: Negative for depression and substance abuse. The patient does not have insomnia.       No Known Allergies    Past Medical History:   Diagnosis Date    Basal cell carcinoma     right arm Dr Betty Serrato CAD (coronary artery disease) 4/12    s/p stent LAD Dr. Solange Allan Colon polyp     2007; Dr Tiffany Alas 5/17    Essential hypertension     Frozen shoulder syndrome 2007    Dr. Ligia Ferraro History of coronary artery stent placement 10/2/2017    3/12 LAD PCI    Hyperlipidemia     Hypertrophy of prostate with urinary obstruction and other lower urinary tract symptoms (LUTS)     IBS (irritable bowel syndrome)     Impaired fasting glucose 3/12    Obesity     peak weight 217 lbs, bmi 32 from 12/14    Phymatous rosacea     Prostate CA ([facility] [address]     T2C [place] 7 Dr Truong Pock 8/15    Squamous cell cancer of skin of nose 2016    s/p [facility] Dr Eliecer Penaloza Zoster 1980s       Family History   Problem Relation Age of Onset    Heart Disease Mother     Lung Disease Father     Dementia Father     Stroke Paternal Grandmother        Social History   Substance Use Topics    Smoking status: Never Smoker    Smokeless tobacco: Never Used    Alcohol use Yes      Comment: greater than 15-20 12-ounce beers/week        Current Outpatient Prescriptions   Medication Sig    atorvastatin (LIPITOR) 40 mg tablet TAKE 1 TABLET EVERY DAY    meclizine (ANTIVERT) 25 mg tablet Take 0.5-1 tab by mouth 3 times daily as needed for vertigo.  metoprolol tartrate (LOPRESSOR) 50 mg tablet TAKE 1 TABLET TWICE A DAY    nitroglycerin (NITROSTAT) 0.4 mg SL tablet 1 Tab by SubLINGual route every five (5) minutes as needed for up to 3 doses.  Cholecalciferol, Vitamin D3, 1,000 unit cap Take  by mouth as needed.  vitamin a-vitamin c-vit e-min (OCUVITE) tablet Take 1 Tab by mouth daily.  aspirin 81 mg tablet Take 1 Tab by mouth daily. No current facility-administered medications for this visit.          Past Surgical History:   Procedure Laterality Date    ABDOMEN SURGERY PROC UNLISTED  1988    inguinal hernia repair on left    HX COLONOSCOPY      Dr Sanchez Every 2007 polyp; Dr Sanchez Every 5/17 polyp    HX CORONARY STENT PLACEMENT  3/12    3/12 prox LAD AJITH    HX TONSILLECTOMY      HX UROLOGICAL  06/10    TURP    HX UROLOGICAL  05/10    TURP with biopsy 7/08, 5/10    HX UROLOGICAL  10/15    DaVinci prostatectomy Dr Kem Hoskins in [address] [place] [address], NEEDLE, SATURATION SAMPLING      06/10, 5/10, 07/08, 8/15       Diagnostic Studies:  CARDIOLOGY STUDIES 3/28/2012 3/1/2012   EKG Result - Merwyn Sary, WNL   Myocardial Perfusion Scan Result - abnormal EKG, ant ischemia, nl EF   Cardiac Cath with PCI Result AJITH prox LAD -   Some recent data might be hidden   1/18 Nuc Stress  Conclusion:   1. Normal perfusion scan. 2. Normal wall motion and ejection fraction. 3. No evidence of significant fixed or reversible defect suggesting ischemia or myocardial infarction noted from this nuclear study. 4. Low risk scan. Visit Vitals    /55    Pulse 78    Ht 5' 9\" (1.753 m)    Wt 187 lb (84.8 kg)    BMI 27.62 kg/m2       Mr. Gloria Mccartney has a reminder for a \"due or due soon\" health maintenance. I have asked that he contact his primary care provider for follow-up on this health maintenance. Physical Exam   Constitutional: He is oriented to person, place, and time. He appears well-developed and well-nourished. No distress. HENT:   Head: Normocephalic and atraumatic. Eyes: Right eye exhibits no discharge. Left eye exhibits no discharge. No scleral icterus. Neck: Neck supple. No JVD present. Carotid bruit is not present. No thyromegaly present. Cardiovascular: Normal rate, regular rhythm, S1 normal, S2 normal, normal heart sounds and intact distal pulses. Exam reveals no gallop and no friction rub. No murmur heard. Pulmonary/Chest: Effort normal and breath sounds normal. He has no wheezes. He has no rales. Abdominal: Soft. He exhibits no mass. There is no tenderness. Musculoskeletal: He exhibits no edema. Neurological: He is alert and oriented to person, place, and time. Skin: Skin is warm and dry. No rash noted. Psychiatric: He has a normal mood and affect. His behavior is normal.       ASSESSMENT and PLAN          Diagnoses and all orders for this visit:    1. Coronary artery disease involving native coronary artery of native heart without angina pectoris  Comments:  1/18; 10/17; 10/16 Stable symptoms; no angina      2.  Pure hypercholesterolemia  Comments:  12/17 LDL43; 12/15 LDL59; 12/14 Low density lipoproteins (LDLs) are at goal, triglycerides are at goal, High density lipoproteins (HDLs) are at goal.        3. Essential hypertension  Comments:  Controlled; home BPs 120s/80s      4. History of coronary artery stent placement    5. Overweight (BMI 25.0-29. 9)  Comments:  Weight loss has been strongly encouraged by following dietary restrictions and an exercise routine. Pertinent laboratory and test data reviewed and discussed with patient. See patient instructions also for other medical advice given    There are no discontinued medications. Follow-up Disposition:  Return in about 1 year (around 1/8/2019), or if symptoms worsen or fail to improve.

## 2018-01-08 NOTE — PROGRESS NOTES
[facility]  [address], [address] [place] [address], [place], [address] [place] [address]  [phone] [place] [phone]      Name: Tania Ortiz         MRN#: [id number]      YOB: 1945     Gender: male Ht:5'9 \" Wt:188 lbs            Date of Rest/Stress Images: 1/8/2018   Referring Provider: Lynn Soliz MD  Ordering Provider: Lindy Angela. Otilia Martinez MD, [facility]  Technologist: Pamela Fairbanks. Ab BARBOSA, C.N.M.T. Diagnosis:   1. Coronary artery disease involving native coronary artery of native heart without angina pectoris    2. Essential hypertension    3. History of coronary artery stent placement          Rest/Stress Myoview SPECT Myocardial Perfusion Imaging with  Exercise Stress and Gated SPECT Imaging      PROCEDURE:      Myocardial perfusion imaging was performed at rest approximately 30 mins following the intravenous injection,(Right hand ) of 11.9 mCi of Tc99m Myoview for evaluation of myocardial function and perfusion at rest.     Baseline:   Heart rate 58. Blood pressure 120/80. EKG shows sinus bradycardia normal axis inferior MI, old. No acute ST-T changes. Exercise data:  Patient exercised using standard Bonilla protocol. Patient exercised for a total of 6 minutes and 49 seconds achieving 8.1 METS. Exercise was stopped due to fatigue at patient's request.  Max heart rate is 141 which is 93 % of predicted maximal.  Max BP is 190/70 EKG has no ST-T changes by standard criteria. Conclusions :  1. No ischemic ST-T changes up to 93 % of predicted maximum heart rate. 2.  Normal functional capacity. 3.  No symptoms or arrhythmias with exercise except for rare PVCs. 4.  Appropriate heart rate and blood pressure response. 5.  Perfusion images report to follow    Exercise: At peak exercise, the patient was injected intravenously with 36.9 mCi of Tc99m Myoview and exercise was continued for 15 to 45 seconds.  The stress images were obtained approximately 30 minutes post tracer injection. The stress SPECT study was gated to evaluate regional wall motion and calculate the left ventricular ejection fraction. The data was reconstructed in the short, horizontal long and vertical long axis views and tomographic slices were generated. NUCLEAR IMAGING:   Findings:   1. Stress images reveal normal Myoview distrubution in all the LV segments in short axis, vertical and horizontal long axis views. 2. Resting images have a normal uptake. 3. Gated images reveal normal wall motion and the ejection fraction is calculated to be 77%. Conclusion:   1. Normal perfusion scan. 2. Normal wall motion and ejection fraction. 3. No evidence of significant fixed or reversible defect suggesting ischemia or myocardial infarction noted from this nuclear study. 4. Low risk scan. Thank you for the referral.    E-signed and Interpreting Physician:    Ashley Doe.  Yaquelin Cisneros MD, [facility]     Date of interpretation: 1/8/2018  Date of final report: 1/8/2018

## 2018-01-08 NOTE — MR AVS SNAPSHOT
Visit Information Date & Time Provider Department Dept. Phone Encounter #  
 1/8/2018  9:15 AM Roland David MD [facility] [place] [id number] Follow-up Instructions Return in about 1 year (around 1/8/2019), or if symptoms worsen or fail to improve, for with ekg. Your Appointments 6/4/2018  9:30 AM  
Office Visit with Noa Celis MD  
[facility]) Appt Note: check up [address] [address]  
[phone] [address] [address]  
  
    
 12/20/2018  8:25 AM  
LAB with [facility] NURSE VISIT [facility] [address] [place] [address] ([facility]) Appt Note: labs [address], Suite [facility] [address]  
  
   
 [address], [address]  
  
    
 12/27/2018  8:40 AM  
Office Visit with Lynn Soliz MD  
[facility] [address] [place] [address]) Appt Note: ov per rd  
 [address] [facility], [place] [id number] [address]  
  
   
 [address], [address] Upcoming Health Maintenance Date Due  
 MEDICARE YEARLY EXAM 12/23/2018 GLAUCOMA SCREENING Q2Y 5/10/2019 COLONOSCOPY 5/9/2027 DTaP/Tdap/Td series (2 - Td) 12/22/2027 Allergies as of 1/8/2018  Review Complete On: 1/8/2018 By: Roland David MD  
 No Known Allergies Current Immunizations  Reviewed on 6/17/2016 Name Date Influenza High Dose Vaccine PF 10/16/2017, 12/6/2016 Influenza Vaccine PF 10/31/2014  2:55 PM  
 Influenza Vaccine Whole 1/1/2010 Pneumococcal Conjugate (PCV-13) 6/9/2014 Pneumococcal Polysaccharide (PPSV-23) 10/31/2014  2:57 PM  
 TD Vaccine 6/23/2009 ZZZ-RETIRED (DO NOT USE) Pneumococcal Vaccine (Unspecified Type) 1/1/2009 Zoster Vaccine, Live 1/1/2011 Not reviewed this visit You Were Diagnosed With   
  
 Codes Comments Coronary artery disease involving native coronary artery of native heart without angina pectoris    -  Primary ICD-10-CM: I25.10 ICD-9-CM: 414.01 1/18; 10/17; 10/16 Stable symptoms; no angina Pure hypercholesterolemia     ICD-10-CM: E78.00 ICD-9-CM: 272.0 12/17 LDL43; 12/15 LDL59; 12/14 Low density lipoproteins (LDLs) are at goal, triglycerides are at goal, High density lipoproteins (HDLs) are at goal. 
 
  
 Essential hypertension     ICD-10-CM: I10 
ICD-9-CM: 401.9 Controlled; home BPs 120s/80s History of coronary artery stent placement     ICD-10-CM: Z95.5 ICD-9-CM: V45.82 Overweight (BMI 25.0-29. 9)     ICD-10-CM: J63.6 ICD-9-CM: 278.02 Weight loss has been strongly encouraged by following dietary restrictions and an exercise routine. Vitals BP Pulse Height(growth percentile) Weight(growth percentile) BMI Smoking Status 109/55 78 5' 9\" (1.753 m) 187 lb (84.8 kg) 27.62 kg/m2 Never Smoker Vitals History BMI and BSA Data Body Mass Index Body Surface Area  
 27.62 kg/m 2 2.03 m 2 Preferred Pharmacy Pharmacy Name Phone [address] [phone] Your Updated Medication List  
  
   
This list is accurate as of: 1/8/18 10:25 AM.  Always use your most recent med list.  
  
  
  
  
 aspirin 81 mg tablet Take 1 Tab by mouth daily. atorvastatin 40 mg tablet Commonly known as:  LIPITOR  
TAKE 1 TABLET EVERY DAY  
  
 cholecalciferol 1,000 unit Cap Commonly known as:  VITAMIN D3 Take  by mouth as needed. meclizine 25 mg tablet Commonly known as:  ANTIVERT Take 0.5-1 tab by mouth 3 times daily as needed for vertigo. metoprolol tartrate 50 mg tablet Commonly known as:  LOPRESSOR  
TAKE 1 TABLET TWICE A DAY  
  
 nitroglycerin 0.4 mg SL tablet Commonly known as:  NITROSTAT  
1 Tab by SubLINGual route every five (5) minutes as needed for up to 3 doses. OCUVITE tablet Generic drug:  vitamin a-vitamin c-vit e-min Take 1 Tab by mouth daily. Follow-up Instructions Return in about 1 year (around 1/8/2019), or if symptoms worsen or fail to improve, for with ekg. Patient Instructions There are no discontinued medications. No orders of the defined types were placed in this encounter. Coronary Artery Disease: Care Instructions Your Care Instructions The heart is a muscle, and like any muscle, it needs blood to work well. Coronary artery disease occurs when the arteries that bring oxygen-rich blood to your heart have a buildup of plaque-deposits of fats and other substances. Plaque can reduce blood flow to the heart muscle. This can cause angina symptoms such as chest pain or pressure. A heart attack can happen if blood flow is completely blocked. You can do a lot to improve your health and prevent a heart attack. Eating healthy food, not smoking, getting regular exercise, and taking your medicine are the main things you can do every day to stay healthy. Follow-up care is a key part of your treatment and safety. Be sure to make and go to all appointments, and call your doctor if you are having problems. It's also a good idea to know your test results and keep a list of the medicines you take. How can you care for yourself at home? Medicines ? · Be safe with medicines. Take your medicines exactly as prescribed. Call your doctor if you think you are having a problem with your medicine. You will get more details on the specific medicines your doctor prescribes. You may need several medicines. ¨ Angiotensin-converting enzyme (ACE) inhibitors, angiotensin II receptor blockers (ARBs), beta-blockers, and statins can help prevent a heart attack. ACE inhibitors, ARBs, and beta-blockers help lower your blood pressure. Statins help lower cholesterol, which is a type of fat that can clog your arteries. ¨ Nitrates can help make chest pain happen less often. ¨ Aspirin and other blood thinners help prevent heart attacks and strokes. ? · If your doctor has given you nitroglycerin for angina symptoms (such as chest pain or pressure) keep it with you at all times. If you have symptoms, sit down and rest, and take the first dose of nitroglycerin as directed. If your symptoms get worse or are not getting better within 5 minutes, call 911 right away. Stay on the phone. The emergency  will give you further instructions. ? · Be sure to tell your doctor about any angina symptoms that you have had, even if they went away. ? · Do not take any over-the-counter medicines, vitamins, or herbal products without talking to your doctor first. ? Lifestyle ? Ask your doctor if a cardiac rehab program is right for you. Cardiac rehab can help you make lifestyle changes. In cardiac rehab, a team of health professionals provides education and support to help you make new, healthy habits. ? · Do not smoke. Avoid secondhand smoke too. Smoking can increase your risk of a heart attack or stroke. If you need help quitting, talk to your doctor about stop-smoking programs and medicines. These can increase your chances of quitting for good. ? · Eat a heart-healthy diet that is high in fiber and low in saturated fat and sodium. ¨ Learn what a serving is. A \"serving\" and a \"portion\" are not always the same thing. Make sure that you are not eating larger portions than recommended. For example, a serving of pasta is ½ cup. A serving size of meat is 2 to 3 ounces; a 3-ounce serving is about the size of a deck of cards. ¨ Eat a variety of grain products every day. Include whole-grain foods such as oats, whole wheat bread, and brown rice. ¨ Eat fish, skinless poultry, lean meats, and soy products such as tofu instead of high-fat meats. Cut out all visible fat when you prepare meat. Eat at least 2 servings of fish a week. ¨ Eat a variety of fruit and vegetables every day. They have lots of nutrients that help protect against heart disease, and they have little-if any-fat. Keep carrots, celery, and other veggies handy for snacks. Buy fruit that is in season and store it where you can see it so that you will be tempted to eat it. Cook dishes that have a lot of veggies in them, such as stir-fried dishes and soups. ¨ Read food labels and try to avoid saturated fat and trans fat. They increase your risk of heart disease. Bake, broil, grill, or steam foods instead of frying them. Use olive or canola oil when you cook. Try cholesterol-lowering spreads, such as Benecol or Take Control. ¨ Limit sodium. Your doctor may recommend that you limit sodium to less than 1,500 mg a day. ¨ Limit processed foods, including cookies and crackers. ¨ Limit drinks and foods with added sugar. ¨ Choose low-fat or fat-free milk and dairy products. ¨ Limit alcohol to 2 drinks a day for men and 1 drink a day for women. Too much alcohol can cause health problems. ? · If your doctor recommends it, get more exercise. Walking is a good choice. Bit by bit, increase the amount you walk every day. Try for at least 30 minutes on most days of the week. You also may want to swim, bike, or do other activities. ? · Stay at a healthy weight. Lose weight if you need to.  
? · Talk to your family, friends, or a therapist about your feelings. It is normal to feel upset about having this disease and to feel afraid of having a heart attack. Talking openly about your feelings can help you cope. If you think you have symptoms of depression, talk to your doctor. ? · Avoid colds and flu. Get a pneumococcal vaccine shot. If you have had one before, ask your doctor whether you need another dose. Get a flu vaccine every year. If you must be around people with colds or flu, wash your hands often. When should you call for help? Call 911 anytime you think you may need emergency care. For example, call if: 
? · You have symptoms of a heart attack. These may include: ¨ Chest pain or pressure, or a strange feeling in the chest. 
¨ Sweating. ¨ Shortness of breath. ¨ Nausea or vomiting. ¨ Pain, pressure, or a strange feeling in the back, neck, jaw, or upper belly or in one or both shoulders or arms. ¨ Lightheadedness or sudden weakness. ¨ A fast or irregular heartbeat. After you call 911, the  may tell you to chew 1 adult-strength or 2 to 4 low-dose aspirin. Wait for an ambulance. Do not try to drive yourself. ? · You have angina symptoms (such as chest pain or pressure) that do not go away with rest or are not getting better within 5 minutes after you take a dose of nitroglycerin. ? · You passed out (lost consciousness). ?Call your doctor now or seek immediate medical care if: 
? · You are having angina symptoms, such as chest pain or pressure, more often than usual, or they are different or worse than usual.  
? · You have new or increased shortness of breath. ? · You are dizzy or lightheaded, or you feel like you may faint. ? Watch closely for changes in your health, and be sure to contact your doctor if you have any problems. Where can you learn more? Go to http://tere-bonifacio.info/. Enter B361 in the search box to learn more about \"Coronary Artery Disease: Care Instructions. \" Current as of: September 21, 2016 Content Version: 11.4 © 8977-6701 [employer]. Care instructions adapted under license by take[employer]ot[employer] (which disclaims liability or warranty for this information). If you have questions about a medical condition or this instruction, always ask your healthcare professional. [place] [address] any warranty or liability for your use of this information. Introducing [facility]! [facility] introduces Vacunek patient portal. Now you can access parts of your medical record, email your doctor's office, and request medication refills online. 1. In your internet browser, go to [email or web address]. Double [employer]ation/[employer] 2. Click on the First Time User? Click Here link in the Sign In box. You will see the New Member Sign Up page. 3. Enter your Vacunek Access Code exactly as it appears below. You will not need to use this code after youve completed the sign-up process. If you do not sign up before the expiration date, you must request a new code. · [employer]ek Access Code: [email or web address] Expires: 4/8/2018 10:25 AM 
 
4. Enter the last four digits of your Social Security Number (xxxx) and Date of Birth (mm/dd/yyyy) as indicated and click Submit. You will be taken to the next sign-up page. 5. Create a Vacunek ID. This will be your Vac[employer]ek login ID and cannot be changed, so think of one that is secure and easy to remember. 6. Create a Vac[employer]ek password. You can change your password at any time. 7. Enter your Password Reset Question and Answer. This can be used at a later time if you forget your password. 8. Enter your e-mail address. You will receive e-mail notification when new information is available in [address] 9. Click Sign Up. You can now view and download portions of your medical record. 10. Click the Download Summary menu link to download a portable copy of your medical information. If you have questions, please visit the Frequently Asked Questions section of the [employer] website. Remember, Vac[employer]ek is NOT to be used for urgent needs. For medical emergencies, dial 911. Now available from your iPhone and Android! Please provide this summary of care documentation to your next provider. Your primary care clinician is listed as Thelma Iverson. If you have any questions after today's visit, please call [phone].

## 2018-01-08 NOTE — LETTER
Aleshia Appl 
1945 1/8/2018 Dear MD Alfa Gonsalez MD Fleming Chi, MD Inda Floor, MD 
 
I had the pleasure of evaluating  Mr. Brigette Serrato in office today. Below are the relevant portions of my assessment and plan of care. ICD-10-CM ICD-9-CM 1. Coronary artery disease involving native coronary artery of native heart without angina pectoris I25.10 414.01   
 1/18; 10/17; 10/16 Stable symptoms; no angina 2. Pure hypercholesterolemia E78.00 272.0   
 12/17 LDL43; 12/15 LDL59; 12/14 Low density lipoproteins (LDLs) are at goal, triglycerides are at goal, High density lipoproteins (HDLs) are at goal. 
 
  
3. Essential hypertension I10 401.9 Controlled; home BPs 120s/80s 4. History of coronary artery stent placement Z95.5 V45.82   
5. Overweight (BMI 25.0-29. 9) E66.3 278.02 Weight loss has been strongly encouraged by following dietary restrictions and an exercise routine. Current Outpatient Prescriptions Medication Sig Dispense Refill  atorvastatin (LIPITOR) 40 mg tablet TAKE 1 TABLET EVERY DAY 90 Tab 3  
 meclizine (ANTIVERT) 25 mg tablet Take 0.5-1 tab by mouth 3 times daily as needed for vertigo. 30 Tab 0  
 metoprolol tartrate (LOPRESSOR) 50 mg tablet TAKE 1 TABLET TWICE A  Tab 3  
 nitroglycerin (NITROSTAT) 0.4 mg SL tablet 1 Tab by SubLINGual route every five (5) minutes as needed for up to 3 doses. 25 Tab 0  Cholecalciferol, Vitamin D3, 1,000 unit cap Take  by mouth as needed.  vitamin a-vitamin c-vit e-min (OCUVITE) tablet Take 1 Tab by mouth daily.  aspirin 81 mg tablet Take 1 Tab by mouth daily. 90 Tab 3 No orders of the defined types were placed in this encounter. If you have questions, please do not hesitate to call me. I look forward to following Mr. Brigette Serrato along with you. Sincerely, Juan Oropeza MD

## 2018-01-08 NOTE — PATIENT INSTRUCTIONS
There are no discontinued medications. No orders of the defined types were placed in this encounter. Coronary Artery Disease: Care Instructions  Your Care Instructions    The heart is a muscle, and like any muscle, it needs blood to work well. Coronary artery disease occurs when the arteries that bring oxygen-rich blood to your heart have a buildup of plaque-deposits of fats and other substances. Plaque can reduce blood flow to the heart muscle. This can cause angina symptoms such as chest pain or pressure. A heart attack can happen if blood flow is completely blocked. You can do a lot to improve your health and prevent a heart attack. Eating healthy food, not smoking, getting regular exercise, and taking your medicine are the main things you can do every day to stay healthy. Follow-up care is a key part of your treatment and safety. Be sure to make and go to all appointments, and call your doctor if you are having problems. It's also a good idea to know your test results and keep a list of the medicines you take. How can you care for yourself at home? Medicines  ? · Be safe with medicines. Take your medicines exactly as prescribed. Call your doctor if you think you are having a problem with your medicine. You will get more details on the specific medicines your doctor prescribes. You may need several medicines. ¨ Angiotensin-converting enzyme (ACE) inhibitors, angiotensin II receptor blockers (ARBs), beta-blockers, and statins can help prevent a heart attack. ACE inhibitors, ARBs, and beta-blockers help lower your blood pressure. Statins help lower cholesterol, which is a type of fat that can clog your arteries. ¨ Nitrates can help make chest pain happen less often. ¨ Aspirin and other blood thinners help prevent heart attacks and strokes. ? · If your doctor has given you nitroglycerin for angina symptoms (such as chest pain or pressure) keep it with you at all times.  If you have symptoms, sit down and rest, and take the first dose of nitroglycerin as directed. If your symptoms get worse or are not getting better within 5 minutes, call 911 right away. Stay on the phone. The emergency  will give you further instructions. ? · Be sure to tell your doctor about any angina symptoms that you have had, even if they went away. ? · Do not take any over-the-counter medicines, vitamins, or herbal products without talking to your doctor first.   ? Lifestyle  ? Ask your doctor if a cardiac rehab program is right for you. Cardiac rehab can help you make lifestyle changes. In cardiac rehab, a team of health professionals provides education and support to help you make new, healthy habits. ? · Do not smoke. Avoid secondhand smoke too. Smoking can increase your risk of a heart attack or stroke. If you need help quitting, talk to your doctor about stop-smoking programs and medicines. These can increase your chances of quitting for good. ? · Eat a heart-healthy diet that is high in fiber and low in saturated fat and sodium. ¨ Learn what a serving is. A \"serving\" and a \"portion\" are not always the same thing. Make sure that you are not eating larger portions than recommended. For example, a serving of pasta is ½ cup. A serving size of meat is 2 to 3 ounces; a 3-ounce serving is about the size of a deck of cards. ¨ Eat a variety of grain products every day. Include whole-grain foods such as oats, whole wheat bread, and brown rice. ¨ Eat fish, skinless poultry, lean meats, and soy products such as tofu instead of high-fat meats. Cut out all visible fat when you prepare meat. Eat at least 2 servings of fish a week. ¨ Eat a variety of fruit and vegetables every day. They have lots of nutrients that help protect against heart disease, and they have little-if any-fat. Keep carrots, celery, and other veggies handy for snacks.  Buy fruit that is in season and store it where you can see it so that you will be tempted to eat it. Cook dishes that have a lot of veggies in them, such as stir-fried dishes and soups. ¨ Read food labels and try to avoid saturated fat and trans fat. They increase your risk of heart disease. Bake, broil, grill, or steam foods instead of frying them. Use olive or canola oil when you cook. Try cholesterol-lowering spreads, such as Benecol or Take Control. ¨ Limit sodium. Your doctor may recommend that you limit sodium to less than 1,500 mg a day. ¨ Limit processed foods, including cookies and crackers. ¨ Limit drinks and foods with added sugar. ¨ Choose low-fat or fat-free milk and dairy products. ¨ Limit alcohol to 2 drinks a day for men and 1 drink a day for women. Too much alcohol can cause health problems. ? · If your doctor recommends it, get more exercise. Walking is a good choice. Bit by bit, increase the amount you walk every day. Try for at least 30 minutes on most days of the week. You also may want to swim, bike, or do other activities. ? · Stay at a healthy weight. Lose weight if you need to.   ? · Talk to your family, friends, or a therapist about your feelings. It is normal to feel upset about having this disease and to feel afraid of having a heart attack. Talking openly about your feelings can help you cope. If you think you have symptoms of depression, talk to your doctor. ? · Avoid colds and flu. Get a pneumococcal vaccine shot. If you have had one before, ask your doctor whether you need another dose. Get a flu vaccine every year. If you must be around people with colds or flu, wash your hands often. When should you call for help? Call 911 anytime you think you may need emergency care. For example, call if:  ? · You have symptoms of a heart attack. These may include:  ¨ Chest pain or pressure, or a strange feeling in the chest.  ¨ Sweating. ¨ Shortness of breath. ¨ Nausea or vomiting.   ¨ Pain, pressure, or a strange feeling in the back, neck, jaw, or upper belly or in one or both shoulders or arms. ¨ Lightheadedness or sudden weakness. ¨ A fast or irregular heartbeat. After you call 911, the  may tell you to chew 1 adult-strength or 2 to 4 low-dose aspirin. Wait for an ambulance. Do not try to drive yourself. ? · You have angina symptoms (such as chest pain or pressure) that do not go away with rest or are not getting better within 5 minutes after you take a dose of nitroglycerin. ? · You passed out (lost consciousness). ?Call your doctor now or seek immediate medical care if:  ? · You are having angina symptoms, such as chest pain or pressure, more often than usual, or they are different or worse than usual.   ? · You have new or increased shortness of breath. ? · You are dizzy or lightheaded, or you feel like you may faint. ? Watch closely for changes in your health, and be sure to contact your doctor if you have any problems. Where can you learn more? Go to http://tere-bonifacio.info/. Enter W217 in the search box to learn more about \"Coronary Artery Disease: Care Instructions. \"  Current as of: September 21, 2016  Content Version: 11.4  © 2996-7782 [employer]. Care instructions adapted under license by [employer] (which disclaims liability or warranty for this information). If you have questions about a medical condition or this instruction, always ask your healthcare professional. [place] [address] any warranty or liability for your use of this information.

## 2018-06-04 ENCOUNTER — HOSPITAL ENCOUNTER (OUTPATIENT)
Dept: LAB | Age: 73
Discharge: HOME | End: 2018-06-04
Payer: MEDICARE

## 2018-06-04 ENCOUNTER — OFFICE VISIT (OUTPATIENT)
Dept: UROLOGY | Age: 73
End: 2018-06-04

## 2018-06-04 VITALS
DIASTOLIC BLOOD PRESSURE: 76 MMHG | OXYGEN SATURATION: 98 % | HEIGHT: 69 IN | BODY MASS INDEX: 25.77 KG/M2 | HEART RATE: 52 BPM | WEIGHT: 174 LBS | SYSTOLIC BLOOD PRESSURE: 140 MMHG

## 2018-06-04 DIAGNOSIS — C61 PROSTATE CA (HCC): ICD-10-CM

## 2018-06-04 DIAGNOSIS — C61 PROSTATE CA (HCC): Primary | ICD-10-CM

## 2018-06-04 LAB
BILIRUB UR QL STRIP: NEGATIVE
GLUCOSE UR-MCNC: NEGATIVE MG/DL
KETONES P FAST UR STRIP-MCNC: NEGATIVE MG/DL
PH UR STRIP: 7 [PH] (ref 4.6–8)
PROT UR QL STRIP: NORMAL
PSA SERPL-MCNC: <0.1 NG/ML (ref 0–4)
SP GR UR STRIP: 1.02 (ref 1–1.03)
UA UROBILINOGEN AMB POC: NORMAL (ref 0.2–1)
URINALYSIS CLARITY POC: CLEAR
URINALYSIS COLOR POC: YELLOW
URINE BLOOD POC: NEGATIVE
URINE LEUKOCYTES POC: NEGATIVE
URINE NITRITES POC: NEGATIVE

## 2018-06-04 PROCEDURE — 84153 ASSAY OF PSA TOTAL: CPT | Performed by: UROLOGY

## 2018-06-04 NOTE — PROGRESS NOTES
RBV. Per Dr. Grady Lopez lab drawn in office today for PSA for prostate cancer. Mr. Jose C Corbin has a reminder for a \"due or due soon\" health maintenance. I have asked that he contact his primary care provider for follow-up on this health maintenance.

## 2018-06-04 NOTE — PATIENT INSTRUCTIONS
Prostate-Specific Antigen (PSA) Test: About This Test  What is it? A prostate-specific antigen (PSA) test measures the amount of PSA in your blood. PSA is released by a man's prostate gland into his blood. A high PSA level may mean that you have an enlargement, infection, or cancer of the prostate. Why is this test done? You may have this test to:  · Check for prostate cancer. · Watch prostate cancer and see if treatment is working. How can you prepare for the test?  Do not ejaculate during the 2 days before your PSA blood test, either during sex or masturbation. What happens before the test?  Tell your doctor if you have had a:  · Test to look at your bladder (cystoscopy) in the past several weeks. · Prostate biopsy in the past several weeks. · Prostate infection or urinary tract infection that has not gone away. · Tube (catheter) inserted into your bladder to drain urine recently. What happens during the test?  A health professional takes a sample of your blood. What happens after the test?  You can go back to your usual activities right away. When should you call for help? Watch closely for changes in your health, and be sure to contact your doctor if you have any questions about this test.  Follow-up care is a key part of your treatment and safety. Be sure to make and go to all appointments, and call your doctor if you are having problems. It's also a good idea to keep a list of the medicines you take. Ask your doctor when you can expect to have your test results. Where can you learn more? Go to http://tere-bonifacio.info/. Enter N467 in the search box to learn more about \"Prostate-Specific Antigen (PSA) Test: About This Test.\"  Current as of: May 12, 2017  Content Version: 11.4  © 0599-1285 [employer]. Care instructions adapted under license by Dinda[employer] (which disclaims liability or warranty for this information).  If you have questions about a medical condition or this instruction, always ask your healthcare professional. [place] [address] any warranty or liability for your use of this information.

## 2018-06-04 NOTE — MR AVS SNAPSHOT
[address] [facility] [address] [address] 
[phone] Patient: Angelic Bowers MRN: [id number] :1945 Visit Information Date & Time Provider Department Dept. Phone Encounter #  
 2018  9:30 AM Lon Noriega, [address] [facility] [phone] [id number] Follow-up Instructions Return in about 6 months (around 2018) for PSA. Follow-up and Disposition History Your Appointments 12/10/2018  9:00 AM  
Office Visit with Lon Noriega MD  
[facility] [address] [place] [address]) Appt Note: check up [address] [facility] [address] [address]  
[phone] [address] [id number]  
  
    
 2018  8:25 AM  
LAB with [facility] NURSE VISIT [facility] ([facility]) Appt Note: labs [address], [address] [facility] [address] [place]  
  
   
 [address], [address]  
  
    
 2018  8:40 AM  
Office Visit with Lima Peralta MD  
[facility] [address]) Appt Note: ov per rd  
 [address] [facility], [place] [address] [address] [place]  
  
   
 [address], [address] Upcoming Health Maintenance Date Due Influenza Age 5 to Adult 2018 MEDICARE YEARLY EXAM 2018 GLAUCOMA SCREENING Q2Y 5/10/2019 COLONOSCOPY 2027 DTaP/Tdap/Td series (2 - Td) 2027 Allergies as of 2018  Review Complete On: 2018 By: Lon Noriega MD  
 No Known Allergies Current Immunizations  Reviewed on 2016 Name Date Influenza High Dose Vaccine PF 10/16/2017, 2016 Influenza Vaccine PF 10/31/2014  2:55 PM  
 Influenza Vaccine Whole 2010 Pneumococcal Conjugate (PCV-13) 2014  Pneumococcal Polysaccharide (PPSV-23) 10/31/2014  2:57 PM  
 TD Vaccine 6/23/2009 ZZZ-RETIRED (DO NOT USE) Pneumococcal Vaccine (Unspecified Type) 1/1/2009 Zoster Vaccine, Live 1/1/2011 Not reviewed this visit You Were Diagnosed With   
  
 Codes Comments Prostate CA [facility])    -  Primary ICD-10-CM: Z26 ICD-9-CM: 348 Vitals BP Pulse Height(growth percentile) Weight(growth percentile) SpO2 BMI  
 140/76 (BP 1 Location: Left arm, BP Patient Position: Sitting) (!) 52 5' 9\" (1.753 m) 174 lb (78.9 kg) 98% 25.7 kg/m2 Smoking Status Never Smoker Vitals History BMI and BSA Data Body Mass Index Body Surface Area 25.7 kg/m 2 1.96 m 2 Preferred Pharmacy Pharmacy Name Phone [facility] [address] [phone] Your Updated Medication List  
  
   
This list is accurate as of 6/4/18 10:33 AM.  Always use your most recent med list.  
  
  
  
  
 aspirin 81 mg tablet Take 1 Tab by mouth daily. atorvastatin 40 mg tablet Commonly known as:  LIPITOR  
TAKE 1 TABLET EVERY DAY  
  
 cholecalciferol 1,000 unit Cap Commonly known as:  VITAMIN D3 Take  by mouth as needed. meclizine 25 mg tablet Commonly known as:  ANTIVERT Take 0.5-1 tab by mouth 3 times daily as needed for vertigo. metoprolol tartrate 50 mg tablet Commonly known as:  LOPRESSOR  
TAKE 1 TABLET TWICE A DAY  
  
 nitroglycerin 0.4 mg SL tablet Commonly known as:  NITROSTAT  
1 Tab by SubLINGual route every five (5) minutes as needed for up to 3 doses. OCUVITE tablet Generic drug:  vitamin a-vitamin c-vit e-min Take 1 Tab by mouth daily. We Performed the Following AMB POC URINALYSIS DIP STICK AUTO W/O MICRO [09330 CPT(R)] COLLECTION VENOUS BLOOD,VENIPUNCTURE [id number] CPT(R)] Follow-up Instructions Return in about 6 months (around 12/4/2018) for PSA. Patient Instructions Prostate-Specific Antigen (PSA) Test: About This Test 
What is it? A prostate-specific antigen (PSA) test measures the amount of PSA in your blood. PSA is released by a man's prostate gland into his blood. A high PSA level may mean that you have an enlargement, infection, or cancer of the prostate. Why is this test done? You may have this test to: · Check for prostate cancer. · Watch prostate cancer and see if treatment is working. How can you prepare for the test? 
Do not ejaculate during the 2 days before your PSA blood test, either during sex or masturbation. What happens before the test? 
Tell your doctor if you have had a: 
· Test to look at your bladder (cystoscopy) in the past several weeks. · Prostate biopsy in the past several weeks. · Prostate infection or urinary tract infection that has not gone away. · Tube (catheter) inserted into your bladder to drain urine recently. What happens during the test? 
A health professional takes a sample of your blood. What happens after the test? 
You can go back to your usual activities right away. When should you call for help? Watch closely for changes in your health, and be sure to contact your doctor if you have any questions about this test. 
Follow-up care is a key part of your treatment and safety. Be sure to make and go to all appointments, and call your doctor if you are having problems. It's also a good idea to keep a list of the medicines you take. Ask your doctor when you can expect to have your test results. Where can you learn more? Go to http://tere-bonifacio.info/. Enter X101 in the search box to learn more about \"Prostate-Specific Antigen (PSA) Test: About This Test.\" Current as of: May 12, 2017 Content Version: 11.4 © 5686-2369 [employer].  Care instructions adapted under license by [employer] ???? (which disclaims liability or warranty for this information). If you have questions about a medical condition or this instruction, always ask your healthcare professional. [address] any warranty or liability for your use of this information. Patient Instructions History Introducing \[facility] [place] [facility]\"" & HEALTH SERVICES! Justice Rudolph introduces [employer] patient portal. Now you can access parts of your medical record, email your doctor's office, and request medication refills online. 1. In your internet browser, go to [email or web address] 2. Click on the First Time User? Click Here link in the Sign In box. You will see the New Member Sign Up page. 3. Enter your [employer] Access Code exactly as it appears below. You will not need to use this code after youve completed the sign-up process. If you do not sign up before the expiration date, you must request a new code. · [employer] Access Code: [email or web address] Expires: 9/2/2018  9:28 AM 
 
4. Enter the last four digits of your Social Security Number (xxxx) and Date of Birth (mm/dd/yyyy) as indicated and click Submit. You will be taken to the next sign-up page. 5. Create a [employer] ID. This will be your [employer] login ID and cannot be changed, so think of one that is secure and easy to remember. 6. Create a [employer] password. You can change your password at any time. 7. Enter your Password Reset Question and Answer. This can be used at a later time if you forget your password. 8. Enter your e-mail address. You will receive e-mail notification when new information is available in [address]. 9. Click Sign Up. You can now view and download portions of your medical record. 10. Click the Download Summary menu link to download a portable copy of your medical information. If you have questions, please visit the Frequently Asked Questions section of the [employer] website. Remember, [employer] is NOT to be used for urgent needs. For medical emergencies, dial 911. Now available from your iPhone and Android! Please provide this summary of care documentation to your next provider. Your primary care clinician is listed as Angelito Fairchild. If you have any questions after today's visit, please call [phone].

## 2018-06-04 NOTE — PROGRESS NOTES
Chief Complaint   Patient presents with    Prostate Cancer       HISTORY OF PRESENT ILLNESS:  Mack Moore is a 71 y.o. male who presents today for his semiannual ANAMIKA and especially his PSA. In summary he underwent a radical prostatectomy via robot in [place] in 2015 . Since that time his PSAs have been undetectable. He has excellent urinary control and really has no significant other side effects. ROS documented on the chart.     Past Medical History:   Diagnosis Date    Basal cell carcinoma     right arm Dr Erica Rossi CAD (coronary artery disease) 4/12    s/p stent LAD Dr. Pennie Glasgow Colon polyp     2007; Dr Chun Treviño 5/17    Essential hypertension     Frozen shoulder syndrome 2007    Dr. Maria Newman History of coronary artery stent placement 10/2/2017    3/12 LAD PCI    Hyperlipidemia     Hypertrophy of prostate with urinary obstruction and other lower urinary tract symptoms (LUTS)     IBS (irritable bowel syndrome)     Impaired fasting glucose 3/12    Obesity     peak weight 217 lbs, bmi 32 from 12/14    Phymatous rosacea     Prostate CA ([facility] [address]     T2C [place] 7 Dr Angelique Roberts 8/15    Squamous cell cancer of skin of nose 2016    s/p Moh's Dr Erica Rossi [place] 1980s       Past Surgical History:   Procedure Laterality Date    ABDOMEN SURGERY PROC UNLISTED  1988    inguinal hernia repair on left    HX COLONOSCOPY      Dr Chun Treviño 2007 polyp; Dr Chun Treviño 5/17 polyp    HX CORONARY STENT PLACEMENT  3/12    3/12 prox LAD AJITH    HX TONSILLECTOMY      HX UROLOGICAL  06/10    TURP    HX UROLOGICAL  05/10    TURP with biopsy 7/08, 5/10    HX UROLOGICAL  10/15    DaVinci prostatectomy Dr Welsl More in [address] [place] [address], NEEDLE, SATURATION SAMPLING      06/10, 5/10, 07/08, 8/15       Social History   Substance Use Topics    Smoking status: Never Smoker    Smokeless tobacco: Never Used    Alcohol use Yes      Comment: greater than 15-20 12-ounce beers/week       No Known Allergies    Family History   Problem Relation Age of Onset    Heart Disease Mother     Lung Disease Father     Dementia Father     Stroke Paternal Grandmother        Current Outpatient Prescriptions   Medication Sig Dispense Refill    atorvastatin (LIPITOR) 40 mg tablet TAKE 1 TABLET EVERY DAY 90 Tab 3    metoprolol tartrate (LOPRESSOR) 50 mg tablet TAKE 1 TABLET TWICE A  Tab 3    Cholecalciferol, Vitamin D3, 1,000 unit cap Take  by mouth as needed.  vitamin a-vitamin c-vit e-min (OCUVITE) tablet Take 1 Tab by mouth daily.  aspirin 81 mg tablet Take 1 Tab by mouth daily. 90 Tab 3    meclizine (ANTIVERT) 25 mg tablet Take 0.5-1 tab by mouth 3 times daily as needed for vertigo. 30 Tab 0    nitroglycerin (NITROSTAT) 0.4 mg SL tablet 1 Tab by SubLINGual route every five (5) minutes as needed for up to 3 doses. 25 Tab 0         PHYSICAL EXAMINATION:   Visit Vitals    /76 (BP 1 Location: Left arm, BP Patient Position: Sitting)    Pulse (!) 52    Ht 5' 9\" (1.753 m)    Wt 174 lb (78.9 kg)    SpO2 98%    BMI 25.7 kg/m2     Constitutional: WDWN, Pleasant and appropriate affect, No acute distress. CV:  No peripheral swelling noted  Respiratory: No respiratory distress or difficulties  Abdomen:  No abdominal masses or tenderness. No CVA tenderness. No inguinal hernias noted.  Male:    ANAMIKA:Perineum normal to visual inspection, no erythema or irritation, Sphincter with good tone, the anastomotic area is easily felt but there are no nodules or evidence of any type recurrent neoplasia. SCROTUM:  No scrotal rash or lesions noticed. Normal bilateral testes and epididymis. PENIS: Urethral meatus normal in location and size. No urethral discharge. Skin: No jaundice. Neuro/Psych:  Alert and oriented x 3, affect appropriate. Lymphatic:   No enlarged inguinal lymph nodes.          Results for orders placed or performed in visit on 06/04/18   AMB POC URINALYSIS DIP STICK AUTO W/O MICRO   Result Value Ref Range    Color (UA POC) Yellow     Clarity (UA POC) Clear     Glucose (UA POC) Negative Negative    Bilirubin (UA POC) Negative Negative    Ketones (UA POC) Negative Negative    Specific gravity (UA POC) 1.020 1.001 - 1.035    Blood (UA POC) Negative Negative    pH (UA POC) 7.0 4.6 - 8.0    Protein (UA POC) Trace Negative    Urobilinogen (UA POC) 1 mg/dL 0.2 - 1    Nitrites (UA POC) Negative Negative    Leukocyte esterase (UA POC) Negative Negative         REVIEW OF LABS AND IMAGING:     Imaging Report Reviewed? NO     Images Reviewed? NO           Other Lab Data Reviewed? YES         ASSESSMENT:     ICD-10-CM ICD-9-CM    1. Prostate ca [facility]) [place] 7 s/p Oneda Blush prostatectomy Dr Kevin Boggs 10/15 C61 185 AMB POC URINALYSIS DIP STICK AUTO W/O MICRO      COLLECTION VENOUS BLOOD,VENIPUNCTURE      PSA, DIAGNOSTIC (PROSTATE SPECIFIC AG)            PLAN / DISCUSSION: : All have his PSA determinations today to have been undetectable. This will be repeated today and will have him return in 6 months for follow-up PSA. I would probably like for him to return every 6 months until it has been 5 years since his original surgery and then we could probably go to annual visits. The patient expresses understanding and agreement of the discussion and plan. Duane Bastos, MD on 6/4/2018         Please note: This document has been produced using voice recognition software. Unrecognized errors in transcription may be present.

## 2018-09-17 ENCOUNTER — OFFICE VISIT (OUTPATIENT)
Dept: INTERNAL MEDICINE CLINIC | Age: 73
End: 2018-09-17

## 2018-09-17 VITALS
BODY MASS INDEX: 25.77 KG/M2 | WEIGHT: 174 LBS | DIASTOLIC BLOOD PRESSURE: 70 MMHG | OXYGEN SATURATION: 97 % | HEART RATE: 66 BPM | RESPIRATION RATE: 14 BRPM | HEIGHT: 69 IN | TEMPERATURE: 98.1 F | SYSTOLIC BLOOD PRESSURE: 126 MMHG

## 2018-09-17 DIAGNOSIS — J22 ACUTE RESPIRATORY INFECTION: Primary | ICD-10-CM

## 2018-09-17 RX ORDER — DOXYCYCLINE 100 MG/1
100 CAPSULE ORAL 2 TIMES DAILY
Qty: 20 CAP | Refills: 0 | Status: SHIPPED | OUTPATIENT
Start: 2018-09-17 | End: 2018-09-27

## 2018-09-17 NOTE — PROGRESS NOTES
HPI/History Sixto Hoff is a 71 y.o.  male who presents for evaluation. Pt reports cough for about 2 wks but becoming deeper and productive more recently. Admits to chronic postnasal drainage but no gross changes. Has not visualized the production or secretions. Subjective fever over the weekend but no actual measurements. No rigors. No other sxs. Denies hx of smoking hx or asthma. No other complaints. Patient Active Problem List  
Diagnosis Code  Impaired fasting glucose R73.01  
 Hyperlipidemia E78.5  Prostate ca [facility]) [place] 7 s/p Richard Handler prostatectomy Dr Mega Solano 10/15 C61  
 Essential hypertension I10  
 Coronary artery disease involving native coronary artery without angina pectoris Dr Yordan Field I25.10  Advance directive discussed with patient Z70.80  
 History of coronary artery stent placement Z95.5  Overweight (BMI 25.0-29. 9) E66.3 Past Medical History:  
Diagnosis Date  Basal cell carcinoma   
 right arm Dr Roya Kirk  CAD (coronary artery disease) 4/12  
 s/p stent LAD Dr. Yordan Field  Colon polyp 2007; Dr Perdomo  5/17  Essential hypertension  Frozen shoulder syndrome 2007 Dr. Fauzia Nathan  History of coronary artery stent placement 10/2/2017  
 3/12 LAD PCI  Hyperlipidemia  Hypertrophy of prostate with urinary obstruction and other lower urinary tract symptoms (LUTS)  IBS (irritable bowel syndrome)  Impaired fasting glucose 3/12  Obesity   
 peak weight 217 lbs, bmi 32 from 12/14  Phymatous rosacea  Prostate CA ([facility] [address] T2C [place] 7 Dr Platt [facility] 8/15  Squamous cell cancer of skin of nose 2016  
 s/p Moh's Dr Roya Kirk  [place] 1980s Past Surgical History:  
Procedure Laterality Date  ABDOMEN SURGERY PROC UNLISTED  1988  
 inguinal hernia repair on left  HX COLONOSCOPY Dr Perdomo  2007 polyp; Dr Perdomo  5/17 polyp  HX CORONARY STENT PLACEMENT  3/12  
 3/12 prox LAD AJITH  
 HX TONSILLECTOMY  HX UROLOGICAL  06/10 TURP  
 HX UROLOGICAL  05/10 TURP with biopsy 7/08, 5/10  
 HX UROLOGICAL  10/15 DaVinci prostatectomy Dr Radha Osborne [place]  RI PROSTATE BIOPSY, NEEDLE, SATURATION SAMPLING    
 06/10, 5/10, 07/08, 8/15 Social History Social History  Marital status:  Spouse name: N/A  
 Number of children: 3  
 Years of education: N/A Occupational History  engr [employer] Social History Main Topics  Smoking status: Never Smoker  Smokeless tobacco: Never Used  Alcohol use Yes Comment: greater than 15-20 12-ounce beers/week  Drug use: No  
 Sexual activity: Not on file Other Topics Concern  Not on file Social History Narrative Family History Problem Relation Age of Onset  Heart Disease Mother  Lung Disease Father  Dementia Father  Stroke Paternal Grandmother Current Outpatient Prescriptions Medication Sig  
 doxycycline (VIBRAMYCIN) 100 mg capsule Take 1 Cap by mouth two (2) times a day for 10 days.  atorvastatin (LIPITOR) 40 mg tablet TAKE 1 TABLET EVERY DAY  metoprolol tartrate (LOPRESSOR) 50 mg tablet TAKE 1 TABLET TWICE A DAY  nitroglycerin (NITROSTAT) 0.4 mg SL tablet 1 Tab by SubLINGual route every five (5) minutes as needed for up to 3 doses.  Cholecalciferol, Vitamin D3, 1,000 unit cap Take  by mouth as needed.  vitamin a-vitamin c-vit e-min (OCUVITE) tablet Take 1 Tab by mouth daily.  aspirin 81 mg tablet Take 1 Tab by mouth daily. No current facility-administered medications for this visit. No Known Allergies Review of Systems Aside from those included in HPI, remainder of ROS negative. Physical Examination Visit Vitals  /70 (BP 1 Location: Right arm, BP Patient Position: Sitting)  Pulse 66  Temp 98.1 °F (36.7 °C) (Oral)  Resp 14  
 Ht 5' 9\" (1.753 m)  Wt 174 lb (78.9 kg)  SpO2 97%  BMI 25.7 kg/m2 General - Alert and in no acute distress. Pt appears well, comfortable, and in good spirits. Pleasant, engaging. Nontoxic. Not anxious, non-diaphoretic. Mental status - Appropriate mood, behavior, speech content, dress, and thought processes. Eyes - Pupils equal and reactive, extraocular movements intact. No erythema or discharge. Ears - Auditory canals and TMs unremarkable. Nose - No erythema. No rhinorrhea. Mouth - Mucous membranes moist. Pharynx without lesions, swelling, erythema, or exudate. Neck - Supple without rigidity. Lymph - No periauricular, perimandibular, or cervical tenderness or swelling. Pulm - No significant cough today. Full, complete sentences. No tachypnea, retractions, or cyanosis. Good respiratory effort. Intermittent wheeze mostly noted a right posterior base. Otherwise good breath sounds and no appreciable rales or rhonchi. Cardiovascular - Normal rate, regular rhythm. Assessment and Plan 1. Acute respiratory infection - Supportive measures discussed along with course and prognosis. Doxycycline prescribed in case of worsening, fevers, or no improvement. Return/call if needed. Further planning as warranted. Pt happily agrees with plan. PLEASE NOTE:  
This document has been produced using voice recognition software. Unrecognized errors in transcription may be present. Sudeep Ashford PA-C [facility] 
[phone] 9/17/2018

## 2018-09-17 NOTE — MR AVS SNAPSHOT
[address] [facility] [address] 
 
 
 [address], [address] [place] [address] [facility] 
[phone] Patient: Shay Garvey MRN: [id number] :1945 Visit Information Date & Time Provider Department Dept. Phone Encounter #  
 2018 11:30 AM Bassam Muñiz [facility] [phone] Your Appointments 12/10/2018  9:00 AM  
Office Visit with Maria Isabel Joy MD  
[facility]) Appt Note: check up [address] [address] [phone] [phone] [id number]  
  
    
 2018  8:25 AM  
LAB with [facility] NURSE VISIT [facility] ([facility]) Appt Note: labs [address], [address] [facility] [address] [place]  
  
   
 [address], [address]  
  
    
 2018  8:40 AM  
Office Visit with Lucas Okeefe MD  
[facility]) Appt Note: ov per rd  
 [address] [place]  
  
   
 [address], [address] Upcoming Health Maintenance Date Due Influenza Age 5 to Adult 2018 MEDICARE YEARLY EXAM 2018 GLAUCOMA SCREENING Q2Y 5/10/2019 COLONOSCOPY 2027 DTaP/Tdap/Td series (2 - Td) 2027 Allergies as of 2018  Review Complete On: 2018 By: Lara Chung LPN No Known Allergies Current Immunizations  Reviewed on 2016 Name Date Influenza High Dose Vaccine PF 10/16/2017, 2016 Influenza Vaccine PF 10/31/2014  2:55 PM  
 Influenza Vaccine Whole 2010 Pneumococcal Conjugate (PCV-13) 2014 Pneumococcal Polysaccharide (PPSV-23) 10/31/2014  2:57 PM  
 TD Vaccine 2009 ZZZ-RETIRED (DO NOT USE) Pneumococcal Vaccine (Unspecified Type) 2009 Zoster Vaccine, Live 1/1/2011 Not reviewed this visit Vitals BP Pulse Temp Resp Height(growth percentile) Weight(growth percentile) 126/70 (BP 1 Location: Right arm, BP Patient Position: Sitting) 66 98.1 °F (36.7 °C) (Oral) 14 5' 9\" (1.753 m) 174 lb (78.9 kg) SpO2 BMI Smoking Status 97% 25.7 kg/m2 Never Smoker Vitals History BMI and BSA Data Body Mass Index Body Surface Area 25.7 kg/m 2 1.96 m 2 Preferred Pharmacy Pharmacy Name Phone Lauren Mao [address] [place] [phone] [facility] [address] [place] [address] [phone] Your Updated Medication List  
  
   
This list is accurate as of 9/17/18 11:38 AM.  Always use your most recent med list.  
  
  
  
  
 aspirin 81 mg tablet Take 1 Tab by mouth daily. atorvastatin 40 mg tablet Commonly known as:  LIPITOR  
TAKE 1 TABLET EVERY DAY  
  
 cholecalciferol 1,000 unit Cap Commonly known as:  VITAMIN D3 Take  by mouth as needed. meclizine 25 mg tablet Commonly known as:  ANTIVERT Take 0.5-1 tab by mouth 3 times daily as needed for vertigo. metoprolol tartrate 50 mg tablet Commonly known as:  LOPRESSOR  
TAKE 1 TABLET TWICE A DAY  
  
 nitroglycerin 0.4 mg SL tablet Commonly known as:  NITROSTAT  
1 Tab by SubLINGual route every five (5) minutes as needed for up to 3 doses. OCUVITE tablet Generic drug:  vitamin a-vitamin c-vit e-min Take 1 Tab by mouth daily. Introducing [facility]! [facility] introduces [employer] patient portal. Now you can access parts of your medical record, email your doctor's office, and request medication refills online. 1. In your internet browser, go to [email or web address] 2. Click on the First Time User? Click Here link in the Sign In box. You will see the New Member Sign Up page. 3. Enter your [employer] Access Code exactly as it appears below.  You will not need to use this code after youve completed the sign-up process. If you do not sign up before the expiration date, you must request a new code. · [employer] Access Code: [email or web address] Expires: 12/16/2018 11:05 AM 
 
4. Enter the last four digits of your Social Security Number (xxxx) and Date of Birth (mm/dd/yyyy) as indicated and click Submit. You will be taken to the next sign-up page. 5. Create a [employer] ID. This will be your [employer] login ID and cannot be changed, so think of one that is secure and easy to remember. 6. Create a [employer] password. You can change your password at any time. 7. Enter your Password Reset Question and Answer. This can be used at a later time if you forget your password. 8. Enter your e-mail address. You will receive e-mail notification when new information is available in [address] 9. Click Sign Up. You can now view and download portions of your medical record. 10. Click the Download Summary menu link to download a portable copy of your medical information. If you have questions, please visit the Frequently Asked Questions section of the [employer] website. Remember, [employer] is NOT to be used for urgent needs. For medical emergencies, dial 911. Now available from your iPhone and Android! Please provide this summary of care documentation to your next provider. Your primary care clinician is listed as Zeke Stanford. If you have any questions after today's visit, please call [phone].

## 2018-09-17 NOTE — PROGRESS NOTES
1. Have you been to the ER, urgent care clinic or hospitalized since your last visit? NO.  
 
2. Have you seen or consulted any other health care providers outside of the [facility] since your last visit (Include any pap smears or colon screening)? NO Do you have an Advanced Directive? NO Would you like information on Advanced Directives? Already has paper work

## 2018-10-10 RX ORDER — ATORVASTATIN CALCIUM 40 MG/1
TABLET, FILM COATED ORAL
Qty: 90 TAB | Refills: 3 | Status: SHIPPED | OUTPATIENT
Start: 2018-10-10 | End: 2019-07-31 | Stop reason: SDUPTHER

## 2018-11-16 ENCOUNTER — CLINICAL SUPPORT (OUTPATIENT)
Dept: INTERNAL MEDICINE CLINIC | Age: 73
End: 2018-11-16

## 2018-11-16 DIAGNOSIS — Z23 ENCOUNTER FOR IMMUNIZATION: Primary | ICD-10-CM

## 2018-12-10 ENCOUNTER — HOSPITAL ENCOUNTER (OUTPATIENT)
Dept: LAB | Age: 73
Discharge: HOME | End: 2018-12-10
Payer: MEDICARE

## 2018-12-10 ENCOUNTER — OFFICE VISIT (OUTPATIENT)
Dept: UROLOGY | Age: 73
End: 2018-12-10

## 2018-12-10 VITALS
HEART RATE: 55 BPM | DIASTOLIC BLOOD PRESSURE: 66 MMHG | HEIGHT: 69 IN | OXYGEN SATURATION: 97 % | SYSTOLIC BLOOD PRESSURE: 127 MMHG | WEIGHT: 181 LBS | BODY MASS INDEX: 26.81 KG/M2

## 2018-12-10 DIAGNOSIS — C61 PROSTATE CANCER (HCC): ICD-10-CM

## 2018-12-10 DIAGNOSIS — C61 PROSTATE CANCER (HCC): Primary | ICD-10-CM

## 2018-12-10 LAB
BILIRUB UR QL STRIP: NEGATIVE
GLUCOSE UR-MCNC: NEGATIVE MG/DL
KETONES P FAST UR STRIP-MCNC: NEGATIVE MG/DL
PH UR STRIP: 5.5 [PH] (ref 4.6–8)
PROT UR QL STRIP: NEGATIVE
PSA SERPL-MCNC: <0.1 NG/ML (ref 0–4)
SP GR UR STRIP: 1.03 (ref 1–1.03)
UA UROBILINOGEN AMB POC: NORMAL (ref 0.2–1)
URINALYSIS CLARITY POC: CLEAR
URINALYSIS COLOR POC: YELLOW
URINE BLOOD POC: NEGATIVE
URINE LEUKOCYTES POC: NEGATIVE
URINE NITRITES POC: NEGATIVE

## 2018-12-10 PROCEDURE — 84153 ASSAY OF PSA TOTAL: CPT

## 2018-12-10 NOTE — PROGRESS NOTES
RBV. Per Dr. Princess Medellin lab drawn in office today for PSA for prostate cancer. Mr. Lakeshia Paul has a reminder for a \"due or due soon\" health maintenance. I have asked that he contact his primary care provider for follow-up on this health maintenance.

## 2018-12-10 NOTE — PATIENT INSTRUCTIONS
Prostate-Specific Antigen (PSA) Test: About This Test  What is it? A prostate-specific antigen (PSA) test measures the amount of PSA in your blood. PSA is released by a man's prostate gland into his blood. A high PSA level may mean that you have an enlargement, infection, or cancer of the prostate. Why is this test done? You may have this test to:  · Check for prostate cancer. · Watch prostate cancer and see if treatment is working. How can you prepare for the test?  Do not ejaculate during the 2 days before your PSA blood test, either during sex or masturbation. What happens before the test?  Tell your doctor if you have had a:  · Test to look at your bladder (cystoscopy) in the past several weeks. · Prostate biopsy in the past several weeks. · Prostate infection or urinary tract infection that has not gone away. · Tube (catheter) inserted into your bladder to drain urine recently. What happens during the test?  A health professional takes a sample of your blood. What happens after the test?  You can go back to your usual activities right away. When should you call for help? Watch closely for changes in your health, and be sure to contact your doctor if you have any questions about this test.  Follow-up care is a key part of your treatment and safety. Be sure to make and go to all appointments, and call your doctor if you are having problems. It's also a good idea to keep a list of the medicines you take. Ask your doctor when you can expect to have your test results. Where can you learn more? Go to http://tere-bonifacio.info/. Enter M761 in the search box to learn more about \"Prostate-Specific Antigen (PSA) Test: About This Test.\"  Current as of: March 28, 2018  Content Version: 11.8  © 6520-5178 [employer]. Care instructions adapted under license by [employer] (which disclaims liability or warranty for this information).  If you have questions about a medical condition or this instruction, always ask your healthcare professional. [place] [address] any warranty or liability for your use of this information.

## 2018-12-10 NOTE — PROGRESS NOTES
Chief Complaint   Patient presents with    Prostate Cancer       HISTORY OF PRESENT ILLNESS:  Hamlet Marin is a 71 y.o. male who presents today in follow-up to a history of prostate cancer. In summary he underwent a robotic assisted radical prostatectomy in [place] in 2015. His PSA determinations since that time have all been undetectable. The PSA is repeated today and he will return in 6 months. We will probably continue to see him in 6-month intervals until it has been 5 years since surgery and then go to annual determinations. He remains continent and having no urinary issues at all.            ROS documented      Past Medical History:   Diagnosis Date    Basal cell carcinoma     right arm Dr Leonarda Blackburn CAD (coronary artery disease) 4/12    s/p stent LAD Dr. Annmarie Mcdonald Colon polyp     2007; Dr Josafat Avelar 5/17    Essential hypertension     Frozen shoulder syndrome 2007    Dr. Macario Romero History of coronary artery stent placement 10/2/2017    3/12 LAD PCI    Hyperlipidemia     Hypertrophy of prostate with urinary obstruction and other lower urinary tract symptoms (LUTS)     IBS (irritable bowel syndrome)     Impaired fasting glucose 3/12    Obesity     peak weight 217 lbs, bmi 32 from 12/14    Phymatous rosacea     Prostate CA (HCC)     T2C [place] 7 Dr Sellers Ra 8/15    Squamous cell cancer of skin of nose 2016    s/p Moh's Dr Leonarda Blackburn [place] 1980s       Past Surgical History:   Procedure Laterality Date    ABDOMEN SURGERY PROC UNLISTED  1988    inguinal hernia repair on left    HX COLONOSCOPY      Dr Josafat Avelar 2007 polyp; Dr Josafat Avelar 5/17 polyp    HX CORONARY STENT PLACEMENT  3/12    3/12 prox LAD AJITH    HX TONSILLECTOMY      HX UROLOGICAL  06/10    TURP    HX UROLOGICAL  05/10    TURP with biopsy 7/08, 5/10    HX UROLOGICAL  10/15    DaVinci prostatectomy Dr [facility] in [address] [place] [address], NEEDLE, SATURATION SAMPLING      06/10, 5/10, 07/08, 8/15       Social History Tobacco Use    Smoking status: Never Smoker    Smokeless tobacco: Never Used   Substance Use Topics    Alcohol use: Yes     Comment: greater than 15-20 12-ounce beers/week    Drug use: No       No Known Allergies    Family History   Problem Relation Age of Onset    Heart Disease Mother     Lung Disease Father     Dementia Father     Stroke Paternal Grandmother        Current Outpatient Medications   Medication Sig Dispense Refill    atorvastatin (LIPITOR) 40 mg tablet TAKE 1 TABLET EVERY DAY 90 Tab 3    metoprolol tartrate (LOPRESSOR) 50 mg tablet TAKE 1 TABLET TWICE A  Tab 3    Cholecalciferol, Vitamin D3, 1,000 unit cap Take  by mouth as needed.  vitamin a-vitamin c-vit e-min (OCUVITE) tablet Take 1 Tab by mouth daily.  aspirin 81 mg tablet Take 1 Tab by mouth daily. 90 Tab 3    nitroglycerin (NITROSTAT) 0.4 mg SL tablet 1 Tab by SubLINGual route every five (5) minutes as needed for up to 3 doses. 25 Tab 0         PHYSICAL EXAMINATION:   Visit Vitals  /66 (BP 1 Location: Left arm, BP Patient Position: Sitting)   Pulse (!) 55   Ht 5' 9\" (1.753 m)   Wt 181 lb (82.1 kg)   SpO2 97%   BMI 26.73 kg/m²     Constitutional: WDWN, Pleasant and appropriate affect, No acute distress. CV:  No peripheral swelling noted  Respiratory: No respiratory distress or difficulties  Abdomen:  No abdominal masses or tenderness. No CVA tenderness. No inguinal hernias noted.  Male:    Deferred today. Skin: No jaundice. Neuro/Psych:  Alert and oriented x 3, affect appropriate. Lymphatic:   No enlarged inguinal lymph nodes.          Results for orders placed or performed in visit on 12/10/18   AMB POC URINALYSIS DIP STICK AUTO W/O MICRO   Result Value Ref Range    Color (UA POC) Yellow     Clarity (UA POC) Clear     Glucose (UA POC) Negative Negative    Bilirubin (UA POC) Negative Negative    Ketones (UA POC) Negative Negative    Specific gravity (UA POC) 1.030 1.001 - 1.035    Blood (UA POC) Negative Negative    pH (UA POC) 5.5 4.6 - 8.0    Protein (UA POC) Negative Negative    Urobilinogen (UA POC) 0.2 mg/dL 0.2 - 1    Nitrites (UA POC) Negative Negative    Leukocyte esterase (UA POC) Negative Negative         REVIEW OF LABS AND IMAGING:     Imaging Report Reviewed? NO     Images Reviewed? NO           Other Lab Data Reviewed? YES         ASSESSMENT:     ICD-10-CM ICD-9-CM    1. [facility] C61 185 AMB POC URINALYSIS DIP STICK AUTO W/O MICRO      PSA, DIAGNOSTIC (PROSTATE SPECIFIC AG)      COLLECTION VENOUS BLOOD,VENIPUNCTURE            PLAN / DISCUSSION: : History of localized prostate cancer with undetectable PSA determinations since 2015 following robot-assisted radical prostatectomy. Return in 6 months for follow-up PSA. The patient expresses understanding and agreement of the discussion and plan. Luis Aguilera MD on 12/10/2018         Please note: This document has been produced using voice recognition software. Unrecognized errors in transcription may be present.

## 2018-12-20 ENCOUNTER — LAB ONLY (OUTPATIENT)
Dept: INTERNAL MEDICINE CLINIC | Age: 73
End: 2018-12-20

## 2018-12-20 ENCOUNTER — HOSPITAL ENCOUNTER (OUTPATIENT)
Dept: LAB | Age: 73
Discharge: HOME | End: 2018-12-20
Payer: MEDICARE

## 2018-12-20 DIAGNOSIS — E78.00 PURE HYPERCHOLESTEROLEMIA: ICD-10-CM

## 2018-12-20 DIAGNOSIS — E78.00 PURE HYPERCHOLESTEROLEMIA: Primary | ICD-10-CM

## 2018-12-20 LAB
ALBUMIN SERPL-MCNC: 4 G/DL (ref 3.4–5)
ALBUMIN/GLOB SERPL: 1.5 {RATIO} (ref 0.8–1.7)
ALP SERPL-CCNC: 79 U/L (ref 45–117)
ALT SERPL-CCNC: 33 U/L (ref 16–61)
ANION GAP SERPL CALC-SCNC: 6 MMOL/L (ref 3–18)
AST SERPL-CCNC: 20 U/L (ref 15–37)
BASOPHILS # BLD: 0 K/UL (ref 0–0.1)
BASOPHILS NFR BLD: 0 % (ref 0–2)
BILIRUB SERPL-MCNC: 0.4 MG/DL (ref 0.2–1)
BUN SERPL-MCNC: 16 MG/DL (ref 7–18)
BUN/CREAT SERPL: 19 (ref 12–20)
CALCIUM SERPL-MCNC: 8.7 MG/DL (ref 8.5–10.1)
CHLORIDE SERPL-SCNC: 109 MMOL/L (ref 100–108)
CHOLEST SERPL-MCNC: 124 MG/DL
CO2 SERPL-SCNC: 29 MMOL/L (ref 21–32)
CREAT SERPL-MCNC: 0.84 MG/DL (ref 0.6–1.3)
DIFFERENTIAL METHOD BLD: ABNORMAL
EOSINOPHIL # BLD: 0.3 K/UL (ref 0–0.4)
EOSINOPHIL NFR BLD: 5 % (ref 0–5)
ERYTHROCYTE [DISTWIDTH] IN BLOOD BY AUTOMATED COUNT: 13.8 % (ref 11.6–14.5)
GLOBULIN SER CALC-MCNC: 2.7 G/DL (ref 2–4)
GLUCOSE SERPL-MCNC: 96 MG/DL (ref 74–99)
HCT VFR BLD AUTO: 41.8 % (ref 36–48)
HDLC SERPL-MCNC: 63 MG/DL (ref 40–60)
HDLC SERPL: 2 {RATIO} (ref 0–5)
HGB BLD-MCNC: 13.5 G/DL (ref 13–16)
LDLC SERPL CALC-MCNC: 51 MG/DL (ref 0–100)
LIPID PROFILE,FLP: ABNORMAL
LYMPHOCYTES # BLD: 2 K/UL (ref 0.9–3.6)
LYMPHOCYTES NFR BLD: 34 % (ref 21–52)
MCH RBC QN AUTO: 31.9 PG (ref 24–34)
MCHC RBC AUTO-ENTMCNC: 32.3 G/DL (ref 31–37)
MCV RBC AUTO: 98.8 FL (ref 74–97)
MONOCYTES # BLD: 0.7 K/UL (ref 0.05–1.2)
MONOCYTES NFR BLD: 11 % (ref 3–10)
NEUTS SEG # BLD: 3 K/UL (ref 1.8–8)
NEUTS SEG NFR BLD: 50 % (ref 40–73)
PLATELET # BLD AUTO: 174 K/UL (ref 135–420)
PMV BLD AUTO: 10.8 FL (ref 9.2–11.8)
POTASSIUM SERPL-SCNC: 4.6 MMOL/L (ref 3.5–5.5)
PROT SERPL-MCNC: 6.7 G/DL (ref 6.4–8.2)
RBC # BLD AUTO: 4.23 M/UL (ref 4.7–5.5)
SODIUM SERPL-SCNC: 144 MMOL/L (ref 136–145)
TRIGL SERPL-MCNC: 50 MG/DL (ref ?–150)
VLDLC SERPL CALC-MCNC: 10 MG/DL
WBC # BLD AUTO: 6 K/UL (ref 4.6–13.2)

## 2018-12-20 PROCEDURE — 80053 COMPREHEN METABOLIC PANEL: CPT

## 2018-12-20 PROCEDURE — 36415 COLL VENOUS BLD VENIPUNCTURE: CPT

## 2018-12-20 PROCEDURE — 85025 COMPLETE CBC W/AUTO DIFF WBC: CPT

## 2018-12-20 PROCEDURE — 80061 LIPID PANEL: CPT

## 2018-12-25 NOTE — PROGRESS NOTES
This is a subsequent Medicare Annual Wellness Exam     I have reviewed the patient's medical history in detail and updated the computerized patient record. History     Past Medical History:   Diagnosis Date    Basal cell carcinoma     right arm Dr Laurie Cid CAD (coronary artery disease) 4/12    s/p stent LAD Dr. Rosana Dunn Colon polyp     2007; Dr Kaylee Bond 5/17    Essential hypertension     Frozen shoulder syndrome 2007    Dr. Serafin Jackson History of coronary artery stent placement 10/2/2017    3/12 LAD PCI    Hyperlipidemia     Hypertrophy of prostate with urinary obstruction and other lower urinary tract symptoms (LUTS)     IBS (irritable bowel syndrome)     Impaired fasting glucose 3/12    Obesity     peak weight 217 lbs, bmi 32 from 12/14; IF 1/18 start weight 190+    Phymatous rosacea     Prostate CA (HCC)     T2C [place] 7 Dr Arnaud Yousif 8/15    Squamous cell cancer of skin of nose 2016    s/p Moh's Dr Laurie Cid Zoster 1980s      Past Surgical History:   Procedure Laterality Date    ABDOMEN SURGERY PROC UNLISTED  1988    inguinal hernia repair on left    HX COLONOSCOPY      Dr Kaylee Bond 2007 polyp; Dr Kaylee Bond 5/17 polyp    HX CORONARY STENT PLACEMENT  3/12    3/12 prox LAD AJITH    HX TONSILLECTOMY      HX UROLOGICAL  06/10    TURP    HX UROLOGICAL  05/10    TURP with biopsy 7/08, 5/10    HX UROLOGICAL  10/15    DaVinci prostatectomy Dr Jossue Lewis in [address] [place] [address], [facility], SATURATION SAMPLING      06/10, 5/10, 07/08, 8/15     Current Outpatient Medications   Medication Sig Dispense Refill    atorvastatin (LIPITOR) 40 mg tablet TAKE 1 TABLET EVERY DAY 90 Tab 3    metoprolol tartrate (LOPRESSOR) 50 mg tablet TAKE 1 TABLET TWICE A  Tab 3    nitroglycerin (NITROSTAT) 0.4 mg SL tablet 1 Tab by SubLINGual route every five (5) minutes as needed for up to 3 doses. 25 Tab 0    Cholecalciferol, Vitamin D3, 1,000 unit cap Take  by mouth as needed.       vitamin a-vitamin c-vit e-min (OCUVITE) tablet Take 1 Tab by mouth daily.  aspirin 81 mg tablet Take 1 Tab by mouth daily. 80 Tab 3     No Known Allergies     Family History   Problem Relation Age of Onset    Heart Disease Mother     Lung Disease Father     Dementia Father     Stroke Paternal Grandmother      Social History     Tobacco Use    Smoking status: Never Smoker    Smokeless tobacco: Never Used   Substance Use Topics    Alcohol use: Yes     Comment: greater than 15-20 12-ounce beers/week     Depression Risk Factor Screening:     PHQ over the last two weeks 12/27/2018   Little interest or pleasure in doing things Not at all   Feeling down, depressed, irritable, or hopeless Not at all   Total Score PHQ 2 0     SCREENINGS  Colonoscopy last done 5/17 Dr Adriana Tinajero    Immunization History   Administered Date(s) Administered    Influenza High Dose Vaccine PF 12/06/2016, 10/16/2017    Influenza Vaccine (Tri) Adjuvanted 11/16/2018    Influenza Vaccine PF 10/31/2014    Influenza Vaccine Whole 01/01/2010    Pneumococcal Conjugate (PCV-13) 06/09/2014    Pneumococcal Polysaccharide (PPSV-23) 10/31/2014    TD Vaccine 06/23/2009    ZZZ-RETIRED (DO NOT USE) Pneumococcal Vaccine (Unspecified Type) 01/01/2009    Zoster Vaccine, Live 01/01/2011     Alcohol Risk Factor Screening: On any occasion during the past 3 months, have you had more than 4 drinks containing alcohol? No    Do you average more than 14 drinks per week? No    Functional Ability and Level of Safety:     Hearing Loss   normal-to-mild    Vision - no acute complaints and is followed by Dr Miguel Angel Florentino [facility]   Self-care. Requires assistance with: no ADLs    Fall Risk     Fall Risk Assessment, last 12 mths 12/27/2018   Able to walk? Yes   Fall in past 12 months? No     Abuse Screen   Patient is not abused    Review of Systems   A comprehensive review of systems was negative except for that written in the HPI.     Physical Examination     Visit Vitals  /66   Pulse (!) 59   Temp 98.3 °F (36.8 °C) (Oral)   Resp 14   Ht 5' 9\" (1.753 m)   Wt 179 lb (81.2 kg)   SpO2 98%   BMI 26.43 kg/m²     Evaluation of Cognitive Function:  Mood/affect:  happy  Appearance: age appropriate, overweight, within normal Limits and younger than stated age  Family member/caregiver input: na    Patient Care Team:  Marilyn Williamson MD as PCP - General (Internal Medicine)  Gilbert Elizalde MD as Physician (Urology)  Jimmie Banegas MD (Urology)  Luis Julio MD as Physician (Urology)    Advice/Referrals/Counseling   Education and counseling provided:  Are appropriate based on today's review and evaluation  End-of-Life planning (with patient's consent)  Pneumococcal Vaccine  Influenza Vaccine  Prostate cancer screening tests (PSA, covered annually)  Colorectal cancer screening tests  Cardiovascular screening blood test  Diabetes screening test    Assessment/Plan       ICD-10-CM ICD-9-CM    1. Medicare annual wellness visit, subsequent Z00.00 V70.0    2. Prostate ca [facility]) [place] s/p Pollie Brandyn prostatectomy Dr Juan J Orantes 10/15 C61 185    3. Overweight (BMI 25.0-29. 9) E66.3 278.02    4. Impaired fasting glucose R73.01 790.21 HEMOGLOBIN A1C W/O EAG   5. Pure hypercholesterolemia E78.00 272.0 LIPID PANEL   6. History of coronary artery stent placement Z95.5 V45.82    7. Essential hypertension I10 401.9 CBC W/O DIFF      METABOLIC PANEL, COMPREHENSIVE   8. Coronary artery disease involving native coronary artery of native heart without angina pectoris I25.10 414.01    9. Advanced directives, counseling/discussion Z71.89 V65.49 ADVANCE CARE PLANNING FIRST 30 MINS   10. Screening for alcoholism Z13.39 V79.1 DE ANNUAL ALCOHOL SCREEN 15 MIN   11. Screening for depression Z13.31 V79.0 [facility]   12. Screen for colon cancer Z12.11 V76.51    13. Screening for diabetes mellitus Z13.1 V77.1    14.  Screening for ischemic heart disease Z13.6 V81.0      current treatment plan is effective, no change in therapy  lab results and schedule of future lab studies reviewed with patient  reviewed diet, exercise and weight control  cardiovascular risk and specific lipid/LDL goals reviewed. End of life discussion undertaken.   He will work on getting medical directive in place  shahana advocated  Colonoscopy to be scheduled 2027

## 2018-12-25 NOTE — PROGRESS NOTES
68 y.o. WHITE OR  male who presents for f/u    Denies any cardiovascular complaints and he sees Dr. Zeke Dong He walks, plays golf and table tennis, outside chores no problem    Denies polyuria, polydipsia, nocturia, vision change. Not checking sugars at this time. He put himself on a program akin to intermittent fasting and got down about 25 lbs on his scale within 6 mos. He on a 6-7 hr window most days and doing well    Vitals 12/27/2018 12/10/2018 9/17/2018 6/4/2018 1/8/2018   Weight 179 lb 181 lb 174 lb 174 lb 187 lb     Vitals 12/22/2017 12/18/2017 10/16/2017   Weight 188 lb 9.6 oz 191 lb 199 lb 9.6 oz     He underwent DaVinci prostatectomy by Dr Vito Kramer in [facility] 10/15.   Dr Yaniv Talbot is following and doing well, psa unmeasurable     No gi complaints    LAST MEDICARE WELLNESS EXAM: 6/12/15, 6/20/16, 12/22/17, 12/27/18    IF 1/18    Past Medical History:   Diagnosis Date    Basal cell carcinoma     right arm Dr Edwin Valdez CAD (coronary artery disease) 4/12    s/p stent LAD Dr. Carlos Dash Colon polyp     2007; Dr Luis Laureano 5/17    Essential hypertension     Frozen shoulder syndrome 2007    Dr. Ventura Saucedo History of coronary artery stent placement 10/2/2017    3/12 LAD PCI    Hyperlipidemia     Hypertrophy of prostate with urinary obstruction and other lower urinary tract symptoms (LUTS)     IBS (irritable bowel syndrome)     Impaired fasting glucose 3/12    Obesity     peak weight 217 lbs, bmi 32 from 12/14; IF 1/18 start weight 190+    Phymatous rosacea     Prostate CA (HCC)     T2C [place] 7 Dr Yaniv Talbot 8/15    Squamous cell cancer of skin of nose 2016    s/p Moh's Dr Edwin Valdez [place] 1980s     Past Surgical History:   Procedure Laterality Date    ABDOMEN SURGERY PROC UNLISTED  1988    inguinal hernia repair on left    HX COLONOSCOPY      Dr Luis Laureano 2007 polyp; Dr Luis Laureano 5/17 polyp    HX CORONARY STENT PLACEMENT  3/12    3/12 prox LAD AJITH    HX TONSILLECTOMY      HX UROLOGICAL  06/10    TURP    HX UROLOGICAL  05/10    TURP with biopsy 7/08, 5/10    HX UROLOGICAL  10/15    DaVinci prostatectomy Dr Veronica Feldman in [address], [place], SATURATION SAMPLING      06/10, 5/10, 07/08, 8/15     Social History     Socioeconomic History    Marital status:      Spouse name: Not on file    Number of children: 3    Years of education: Not on file    Highest education level: Not on file   Social Needs    Financial resource strain: Not on file    Food insecurity - worry: Not on file    Food insecurity - inability: Not on file   Zoned Nutrition needs - medical: Not on file   Zoned Nutrition needs - non-medical: Not on file   Occupational History    Occupation: engr [employer]   Tobacco Use    Smoking status: Never Smoker    Smokeless tobacco: Never Used   Substance and Sexual Activity    Alcohol use: Yes     Comment: greater than 15-20 12-ounce beers/week    Drug use: No    Sexual activity: Not on file   Other Topics Concern    Not on file   Social History Narrative    Not on file     Current Outpatient Medications   Medication Sig    atorvastatin (LIPITOR) 40 mg tablet TAKE 1 TABLET EVERY DAY    metoprolol tartrate (LOPRESSOR) 50 mg tablet TAKE 1 TABLET TWICE A DAY    nitroglycerin (NITROSTAT) 0.4 mg SL tablet 1 Tab by SubLINGual route every five (5) minutes as needed for up to 3 doses.  Cholecalciferol, Vitamin D3, 1,000 unit cap Take  by mouth as needed.  vitamin a-vitamin c-vit e-min (OCUVITE) tablet Take 1 Tab by mouth daily.  aspirin 81 mg tablet Take 1 Tab by mouth daily. No current facility-administered medications for this visit.       No Known Allergies    REVIEW OF SYSTEMS: colo 5/17 Dr Yoo Agent, sees Dr Darina Peabody  no vision change or eye pain  Oral  no mouth pain, tongue or tooth problems  Ears  no hearing loss, ear pain, fullness, no swallowing problems  Cardiac  no CP, PND, orthopnea, edema, palpitations or syncope  Chest  no breast masses  Resp  no wheezing, chronic coughing, dyspnea  GI  no heartburn, nausea, vomiting, change in bowel habits, bleeding, hemorrhoids  Urinary  no dysuria, hematuria, flank pain, urgency, frequency  Psych  denies any anxiety or depression symptoms, no hallucinations or violent ideation  Constitutional  no wt loss, night sweats, unexplained fevers  Neuro  no focal weakness, numbness, paresthesias, incoordination, ataxia, involuntary movements  Endo - no polyuria, polydipsia, nocturia, hot flashes    Visit Vitals  /66   Pulse (!) 59   Temp 98.3 °F (36.8 °C) (Oral)   Resp 14   Ht 5' 9\" (1.753 m)   Wt 179 lb (81.2 kg)   SpO2 98%   BMI 26.43 kg/m²     Affect is appropriate. Mood stable  No apparent distress  HEENT --Anicteric sclerae. No thyromegaly, JVD, or bruits. Lungs --Clear to auscultation and percussion, normal percussion. Heart --Regular rate and rhythm, no murmurs, rubs, gallops, or clicks. Abdomen -- Soft and nontender, no hepatosplenomegaly or masses. Extremities -- Without cyanosis, clubbing, edema. 2+ pulses equally and bilaterally.     LABS  From 5/10 showed   gluc 94,   cr 0.90, gfr>60,           wbc 5.4, hb 14.7, plt 185  From 6/10 showed   gluc 107, cr 1.00  From 2/12 showed                                   psa 5.5  From 4/13 showed                                     psa 5.7  From 6/13 showed   gluc 112, cr 0.89, gfr 89,   alt 14,           chol 132, tg 64, hdl 46, ldl-c 73, wbc 5.9, hb 14.2, plt 208, ua neg  From 11/13 showed                    hba1c 5.7, chol 120, tg 48, hdl 47, ldl-c 63  From 6/14 showed         hba1c 5.8, chol 130, tg 54, hdl 51, ldl-c 68, wbc 5.8, hb 14.5, plt 177  From 12/14 showed gluc 117, cr 0.80, gfr>60,  alt 19  hba1c 5.9, chol 138, tg 66, hdl 52, ldl-c 73  From 6/15 showed   gluc 104, cr 0.87, gfr>60,     hba1c 5.7,      wbc 4.8, hb 14.2, plt 161, psa 6.1  From 9/15 showed   gluc 84,   cr 1.01, gfr 76,   alt 34,       wbc 6.1, hb 13.9, plt 199, ua neg pro, inr 1.0 ptt 28, urine cx neg, ast 24  From 12/15 showed gluc 110, cr 0.97, gfr>60,    hba1c 5.6, chol 130, tg 58, hdl 59, ldl-c 59, wbc 6.0, hb 13.9, plt 197, psa<0.1  From 6/16 showed   gluc 113, cr 0.91, gfr>60,     hba1c 5.6   From 12/16 showed                     psa<0.1  From 6/17 showed                     psa<0.1  From 12/17 showed gluc 93,   cr 0.93, gfr>60,  alt 30,           chol 127, tg 63, hdl 71, ldl-c 43, wbc 5.3, hb 16.9, plt 181, psa<0.1    Results for orders placed or performed during the hospital encounter of 06/84/96   METABOLIC PANEL, COMPREHENSIVE   Result Value Ref Range    Sodium 144 136 - 145 mmol/L    Potassium 4.6 3.5 - 5.5 mmol/L    Chloride 109 (H) 100 - 108 mmol/L    CO2 29 21 - 32 mmol/L    Anion gap 6 3.0 - 18 mmol/L    Glucose 96 74 - 99 mg/dL    BUN 16 7.0 - 18 MG/DL    Creatinine 0.84 0.6 - 1.3 MG/DL    BUN/Creatinine ratio 19 12 - 20      GFR est AA >60 >60 ml/min/1.73m2    GFR est non-AA >60 >60 ml/min/1.73m2    Calcium 8.7 8.5 - 10.1 MG/DL    Bilirubin, total 0.4 0.2 - 1.0 MG/DL    ALT (SGPT) 33 16 - 61 U/L    AST (SGOT) 20 15 - 37 U/L    Alk.  phosphatase 79 45 - 117 U/L    Protein, total 6.7 6.4 - 8.2 g/dL    Albumin 4.0 3.4 - 5.0 g/dL    Globulin 2.7 2.0 - 4.0 g/dL    A-G Ratio 1.5 0.8 - 1.7     LIPID PANEL   Result Value Ref Range    LIPID PROFILE          Cholesterol, total 124 <200 MG/DL    Triglyceride 50 <150 MG/DL    HDL Cholesterol 63 (H) 40 - 60 MG/DL    LDL, calculated 51 0 - 100 MG/DL    VLDL, calculated 10 MG/DL    CHOL/HDL Ratio 2.0 0 - 5.0     CBC WITH AUTOMATED DIFF   Result Value Ref Range    WBC 6.0 4.6 - 13.2 K/uL    RBC 4.23 (L) 4.70 - 5.50 M/uL    HGB 13.5 13.0 - 16.0 g/dL    HCT 41.8 36.0 - 48.0 %    MCV 98.8 (H) 74.0 - 97.0 FL    MCH 31.9 24.0 - 34.0 PG    MCHC 32.3 31.0 - 37.0 g/dL    RDW 13.8 11.6 - 14.5 %    PLATELET 211 308 - 166 K/uL    MPV 10.8 9.2 - 11.8 FL    NEUTROPHILS 50 40 - 73 %    LYMPHOCYTES 34 21 - 52 %    MONOCYTES 11 (H) 3 - 10 %    EOSINOPHILS 5 0 - 5 %    BASOPHILS 0 0 - 2 %    ABS. NEUTROPHILS 3.0 1.8 - 8.0 K/UL    ABS. LYMPHOCYTES 2.0 0.9 - 3.6 K/UL    ABS. MONOCYTES 0.7 0.05 - 1.2 K/UL    ABS. EOSINOPHILS 0.3 0.0 - 0.4 K/UL    ABS. BASOPHILS 0.0 0.0 - 0.1 K/UL    DF AUTOMATED       Patient Active Problem List   Diagnosis Code    Impaired fasting glucose R73.01    Hyperlipidemia E78.5    Prostate ca [facility] [place] 7 s/p Daryle Brady prostatectomy Dr Gretchen Marino 10/15 C61    Essential hypertension I10    Coronary artery disease involving native coronary artery without angina pectoris Dr Justice Colindres I25.10    Advance directive discussed with patient Z71.89    History of coronary artery stent placement Z95.5    Overweight (BMI 25.0-29. 9) E66.3     Assessment and plan:  1. Prostate ca. Per Dr Susi Melvin  2. IFG. Lifestyle and dietary measures   3. HLP. Continue statin  4. CHD. F/U Dr. Justice Colindres, continue current  5. HTN. Continue current regimen. 6. Overweight. congratulated him on the wt loss . Continue IF as he is doing, we gave him the handout          RTC 12/19    Above conditions discussed at length and patient vocalized understanding.   All questions answered to patient satisfaction

## 2018-12-27 ENCOUNTER — OFFICE VISIT (OUTPATIENT)
Dept: INTERNAL MEDICINE CLINIC | Age: 73
End: 2018-12-27

## 2018-12-27 VITALS
WEIGHT: 179 LBS | RESPIRATION RATE: 14 BRPM | HEART RATE: 59 BPM | BODY MASS INDEX: 26.51 KG/M2 | TEMPERATURE: 98.3 F | SYSTOLIC BLOOD PRESSURE: 108 MMHG | DIASTOLIC BLOOD PRESSURE: 66 MMHG | HEIGHT: 69 IN | OXYGEN SATURATION: 98 %

## 2018-12-27 DIAGNOSIS — Z13.31 SCREENING FOR DEPRESSION: ICD-10-CM

## 2018-12-27 DIAGNOSIS — Z71.89 ADVANCED DIRECTIVES, COUNSELING/DISCUSSION: ICD-10-CM

## 2018-12-27 DIAGNOSIS — Z00.00 MEDICARE ANNUAL WELLNESS VISIT, SUBSEQUENT: Primary | ICD-10-CM

## 2018-12-27 DIAGNOSIS — Z13.39 SCREENING FOR ALCOHOLISM: ICD-10-CM

## 2018-12-27 DIAGNOSIS — E66.3 OVERWEIGHT (BMI 25.0-29.9): ICD-10-CM

## 2018-12-27 DIAGNOSIS — I25.10 CORONARY ARTERY DISEASE INVOLVING NATIVE CORONARY ARTERY OF NATIVE HEART WITHOUT ANGINA PECTORIS: ICD-10-CM

## 2018-12-27 DIAGNOSIS — Z13.6 SCREENING FOR ISCHEMIC HEART DISEASE: ICD-10-CM

## 2018-12-27 DIAGNOSIS — I10 ESSENTIAL HYPERTENSION: ICD-10-CM

## 2018-12-27 DIAGNOSIS — E78.00 PURE HYPERCHOLESTEROLEMIA: ICD-10-CM

## 2018-12-27 DIAGNOSIS — Z13.1 SCREENING FOR DIABETES MELLITUS: ICD-10-CM

## 2018-12-27 DIAGNOSIS — Z95.5 HISTORY OF CORONARY ARTERY STENT PLACEMENT: ICD-10-CM

## 2018-12-27 DIAGNOSIS — Z12.11 SCREEN FOR COLON CANCER: ICD-10-CM

## 2018-12-27 DIAGNOSIS — C61 PROSTATE CA (HCC): ICD-10-CM

## 2018-12-27 DIAGNOSIS — R73.01 IMPAIRED FASTING GLUCOSE: ICD-10-CM

## 2018-12-27 NOTE — ACP (ADVANCE CARE PLANNING)
Advance Care Planning    Advance Care Planning (ACP) Provider Note - Comprehensive     Date of ACP Conversation: 12/27/18  Persons included in Conversation:  patient  Length of ACP Conversation in minutes:  16 minutes    Authorized Decision Maker (if patient is incapable of making informed decisions): This person is: wife  Other Legally Authorized Decision Maker (e.g. Next of Kin)          General ACP for ALL Patients with Decision Making Capacity:   Importance of advance care planning, including choosing a healthcare agent to communicate patient's healthcare decisions if patient lost the ability to make decisions, such as after a sudden illness or accident  Understanding of the healthcare agent role was assessed and information provided  Exploration of values, goals, and preferences if recovery is not expected, even with continued medical treatment in the event of: Imminent death  Severe, permanent brain injury    Review of Existing Advance Directive:  na    For Serious or Chronic Illness:  Understanding of medical condition    Understanding of CPR, goals and expected outcomes, benefits and burdens discussed.     Interventions Provided:  Recommended completion of Advance Directive form after review of ACP materials and conversation with prospective healthcare agent   Recommended communicating the plan and making copies for the healthcare agent, personal physician, and others as appropriate (e.g., health system)  Recommended review of completed ACP document annually or upon change in health status

## 2018-12-27 NOTE — PATIENT INSTRUCTIONS
Medicare Wellness Visit, Male    The best way to live healthy is to have a lifestyle where you eat a well-balanced diet, exercise regularly, limit alcohol use, and quit all forms of tobacco/nicotine, if applicable. Regular preventive services are another way to keep healthy. Preventive services (vaccines, screening tests, monitoring & exams) can help personalize your care plan, which helps you manage your own care. Screening tests can find health problems at the earliest stages, when they are easiest to treat. [facility] follows the current, evidence-based guidelines published by the [facility] ([facility]) when recommending preventive services for our patients. Because we follow these guidelines, sometimes recommendations change over time as research supports it. (For example, a prostate screening blood test is no longer routinely recommended for men with no symptoms.)  Of course, you and your doctor may decide to screen more often for some diseases, based on your risk and co-morbidities (chronic disease you are already diagnosed with). Preventive services for you include:  - Medicare offers their members a free annual wellness visit, which is time for you and your primary care provider to discuss and plan for your preventive service needs. Take advantage of this benefit every year!  -All adults over age 72 should receive the recommended pneumonia vaccines. Current USPSTF guidelines recommend a series of two vaccines for the best pneumonia protection.   -All adults should have a flu vaccine yearly and an ECG.  All adults age 61 and older should receive a shingles vaccine once in their lifetime.    -All adults age 38-68 who are overweight should have a diabetes screening test once every three years.   -Other screening tests & preventive services for persons with diabetes include: an eye exam to screen for diabetic retinopathy, a kidney function test, a foot exam, and stricter control over your cholesterol.   -Cardiovascular screening for adults with routine risk involves an electrocardiogram (ECG) at intervals determined by the provider.   -Colorectal cancer screening should be done for adults age 54-65 with no increased risk factors for colorectal cancer. There are a number of acceptable methods of screening for this type of cancer. Each test has its own benefits and drawbacks. Discuss with your provider what is most appropriate for you during your annual wellness visit. The different tests include: colonoscopy (considered the best screening method), a fecal occult blood test, a fecal DNA test, and sigmoidoscopy.  -All adults born between [facility] should be screened once for Hepatitis C.  -An Abdominal Aortic Aneurysm (AAA) Screening is recommended for men age 73-68 who has ever smoked in their lifetime.      Here is a list of your current Health Maintenance items (your personalized list of preventive services) with a due date:  Health Maintenance Due   Topic Date Due    Shingles Vaccine (1 of 2) 12/12/1996    Annual Well Visit  12/23/2018

## 2018-12-27 NOTE — PROGRESS NOTES
1. Have you been to the ER, urgent care clinic or hospitalized since your last visit? NO.     2. Have you seen or consulted any other health care providers outside of the [address] [place] [address] since your last visit (Include any pap smears or colon screening)? NO      Do you have an Advanced Directive? NO    Would you like information on Advanced Directives?  NO'    Chief Complaint   Patient presents with    Hypertension     follow up with labs

## 2019-01-11 ENCOUNTER — OFFICE VISIT (OUTPATIENT)
Dept: CARDIOLOGY CLINIC | Age: 74
End: 2019-01-11

## 2019-01-11 VITALS
BODY MASS INDEX: 26.36 KG/M2 | WEIGHT: 178 LBS | HEIGHT: 69 IN | DIASTOLIC BLOOD PRESSURE: 65 MMHG | SYSTOLIC BLOOD PRESSURE: 126 MMHG | HEART RATE: 56 BPM

## 2019-01-11 DIAGNOSIS — E78.00 PURE HYPERCHOLESTEROLEMIA: ICD-10-CM

## 2019-01-11 DIAGNOSIS — I10 ESSENTIAL HYPERTENSION: ICD-10-CM

## 2019-01-11 DIAGNOSIS — Z95.5 HISTORY OF CORONARY ARTERY STENT PLACEMENT: ICD-10-CM

## 2019-01-11 DIAGNOSIS — I25.10 CORONARY ARTERY DISEASE INVOLVING NATIVE CORONARY ARTERY OF NATIVE HEART WITHOUT ANGINA PECTORIS: Primary | ICD-10-CM

## 2019-01-11 RX ORDER — METOPROLOL TARTRATE 25 MG/1
25 TABLET, FILM COATED ORAL 2 TIMES DAILY
Qty: 180 TAB | Refills: 1
Start: 2019-01-11 | End: 2019-08-20 | Stop reason: SDUPTHER

## 2019-01-11 NOTE — PATIENT INSTRUCTIONS
There are no discontinued medications. After the recommended changes have been made in blood pressure medicines, patient advised to keep BP/HR(pulse rate) chart twice daily and bring us results in next 2 weeks or so. Patient may send the results via \"My Chart\" if desired. Please rest for 5-10 minutes before checking blood pressure         Learning About Coronary Artery Disease (CAD)  What is coronary artery disease? Coronary artery disease (CAD) occurs when plaque builds up in the arteries that bring oxygen-rich blood to your heart. Plaque is a fatty substance made of cholesterol, calcium, and other substances in the blood. This process is called hardening of the arteries, or atherosclerosis. What happens when you have coronary artery disease? · Plaque may narrow the coronary arteries. Narrowed arteries cause poor blood flow. This can lead to angina symptoms such as chest pain or discomfort. If blood flow is completely blocked, you could have a heart attack. · You can slow CAD and reduce the risk of future problems by making changes in your lifestyle. These include quitting smoking and eating heart-healthy foods. · Treatments for CAD, along with changes in your lifestyle, can help you live a longer and healthier life. How can you prevent coronary artery disease? · Do not smoke. It may be the best thing you can do to prevent heart disease. If you need help quitting, talk to your doctor about stop-smoking programs and medicines. These can increase your chances of quitting for good. · Be active. Get at least 30 minutes of exercise on most days of the week. Walking is a good choice. You also may want to do other activities, such as running, swimming, cycling, or playing tennis or team sports. · Eat heart-healthy foods. Eat more fruits and vegetables and less foods that contain saturated and trans fats. Limit alcohol, sodium, and sweets. · Stay at a healthy weight. Lose weight if you need to.   · Manage other health problems such as diabetes, high blood pressure, and high cholesterol. · Manage stress. Stress can hurt your heart. To keep stress low, talk about your problems and feelings. Don't keep your feelings hidden. · If you have talked about it with your doctor, take a low-dose aspirin every day. Aspirin can help certain people lower their risk of a heart attack or stroke. But taking aspirin isn't right for everyone, because it can cause serious bleeding. Do not start taking daily aspirin unless your doctor knows about it. How is coronary artery disease treated? · Your doctor will suggest that you make lifestyle changes. For example, your doctor may ask you to eat healthy foods, quit smoking, lose extra weight, and be more active. · You will have to take medicines. · Your doctor may suggest a procedure to open narrowed or blocked arteries. This is called angioplasty. Or your doctor may suggest using healthy blood vessels to create detours around narrowed or blocked arteries. This is called bypass surgery. Follow-up care is a key part of your treatment and safety. Be sure to make and go to all appointments, and call your doctor if you are having problems. It's also a good idea to know your test results and keep a list of the medicines you take. Where can you learn more? Go to http://tere-bonifacio.info/. Enter [phone] in the search box to learn more about \"Learning About Coronary Artery Disease (CAD). \"  Current as of: December 6, 2017  Content Version: 11.8  © 3671-3331 [employer]. Care instructions adapted under license by [employer] (which disclaims liability or warranty for this information). If you have questions about a medical condition or this instruction, always ask your healthcare professional. [place] [address] any warranty or liability for your use of this information.

## 2019-01-11 NOTE — PROGRESS NOTES
1. Have you been to the ER, urgent care clinic since your last visit? Hospitalized since your last visit? No     2. Have you seen or consulted any other health care providers outside of the [address] [place] [address] since your last visit? Include any pap smears or colon screening. Yes Where: PCP     3. Since your last visit, have you had any of the following symptoms? .           4. Have you had any blood work, X-rays or cardiac testing? Yes Where: PCP         5. Where do you normally have your labs drawn?   pcp    6. Do you need any refills today?    no

## 2019-01-11 NOTE — PROGRESS NOTES
HISTORY OF PRESENT ILLNESS  Mike Parrish is a 68 y.o. male. F/u CAD, old LAD PCI, HTN, HLD    Patient denies significant chest pain, SOB, palpitations, edema, dizziness        Hypertension   The history is provided by the medical records. This is a chronic problem. Pertinent negatives include no chest pain, no headaches and no shortness of breath. Cholesterol Problem   The history is provided by the medical records. This is a chronic problem. Pertinent negatives include no chest pain, no headaches and no shortness of breath. Review of Systems   Constitutional: Negative for chills, fever, malaise/fatigue and weight loss. HENT: Negative for nosebleeds. Eyes: Negative for discharge. Respiratory: Negative for cough, shortness of breath and wheezing. Cardiovascular: Negative for chest pain, palpitations, orthopnea, claudication, leg swelling and PND. Gastrointestinal: Negative for diarrhea, nausea and vomiting. Genitourinary: Negative for dysuria and hematuria. Musculoskeletal: Negative for joint pain. Skin: Negative for rash. Neurological: Negative for dizziness, seizures, loss of consciousness and headaches. Endo/Heme/Allergies: Negative for polydipsia. Does not bruise/bleed easily. Psychiatric/Behavioral: Negative for depression and substance abuse. The patient does not have insomnia.       No Known Allergies    Past Medical History:   Diagnosis Date    Basal cell carcinoma     right arm Dr Erica Willis CAD (coronary artery disease) 4/12    s/p stent LAD Dr. Blake Roy Colon polyp     2007; Dr Bobby Leal 5/17    Essential hypertension     Frozen shoulder syndrome 2007    Dr. Rani Alcantara History of coronary artery stent placement 10/2/2017    3/12 LAD PCI    Hyperlipidemia     Hypertrophy of prostate with urinary obstruction and other lower urinary tract symptoms (LUTS)     IBS (irritable bowel syndrome)     Impaired fasting glucose 3/12    Obesity     peak weight 217 lbs, bmi 32 from 12/14; IF 1/18 start weight 190+    Phymatous rosacea     Prostate CA (HCC)     T2C [place] 7 Dr Molly Bustillos 8/15    Squamous cell cancer of skin of nose 2016    s/p Moh's Dr Caridad Tolliver [place] 1980s       Family History   Problem Relation Age of Onset    Heart Disease Mother     Lung Disease Father     Dementia Father     Stroke Paternal Grandmother        Social History     Tobacco Use    Smoking status: Never Smoker    Smokeless tobacco: Never Used   Substance Use Topics    Alcohol use: Yes     Comment: greater than 15-20 12-ounce beers/week    Drug use: No        Current Outpatient Medications   Medication Sig    atorvastatin (LIPITOR) 40 mg tablet TAKE 1 TABLET EVERY DAY    metoprolol tartrate (LOPRESSOR) 50 mg tablet TAKE 1 TABLET TWICE A DAY    nitroglycerin (NITROSTAT) 0.4 mg SL tablet 1 Tab by SubLINGual route every five (5) minutes as needed for up to 3 doses.  Cholecalciferol, Vitamin D3, 1,000 unit cap Take  by mouth as needed.  vitamin a-vitamin c-vit e-min (OCUVITE) tablet Take 1 Tab by mouth daily.  aspirin 81 mg tablet Take 1 Tab by mouth daily. No current facility-administered medications for this visit. Past Surgical History:   Procedure Laterality Date    ABDOMEN SURGERY PROC UNLISTED  1988    inguinal hernia repair on left    HX COLONOSCOPY      Dr Chance Dickinson 2007 polyp; Dr Chance Dickinson 5/17 polyp    HX CORONARY STENT PLACEMENT  3/12    3/12 prox LAD AJITH    HX TONSILLECTOMY      HX UROLOGICAL  06/10    TURP    HX UROLOGICAL  05/10    TURP with biopsy 7/08, 5/10    HX UROLOGICAL  10/15    DaVinci prostatectomy Dr Tiffany Croft in [address] [place] [address], NEEDLE, SATURATION SAMPLING      06/10, 5/10, 07/08, 8/15       Diagnostic Studies:  No flowsheet data found. 1/18 Nuc Stress  Conclusion:   1. Normal perfusion scan. 2. Normal wall motion and ejection fraction.    3. No evidence of significant fixed or reversible defect suggesting ischemia or myocardial infarction noted from this nuclear study. 4. Low risk scan. Visit Vitals  /65   Pulse (!) 56   Ht 5' 9\" (1.753 m)   Wt 80.7 kg (178 lb)   BMI 26.29 kg/m²       Mr. Monse Vallejo has a reminder for a \"due or due soon\" health maintenance. I have asked that he contact his primary care provider for follow-up on this health maintenance. Physical Exam   Constitutional: He is oriented to person, place, and time. He appears well-developed and well-nourished. No distress. HENT:   Head: Normocephalic and atraumatic. Eyes: Right eye exhibits no discharge. Left eye exhibits no discharge. No scleral icterus. Neck: Neck supple. No JVD present. Carotid bruit is not present. No thyromegaly present. Cardiovascular: Normal rate, regular rhythm, S1 normal, S2 normal, normal heart sounds and intact distal pulses. Exam reveals no gallop and no friction rub. No murmur heard. Pulmonary/Chest: Effort normal and breath sounds normal. He has no wheezes. He has no rales. Abdominal: Soft. He exhibits no mass. There is no tenderness. Musculoskeletal: He exhibits no edema. Neurological: He is alert and oriented to person, place, and time. Skin: Skin is warm and dry. No rash noted. Psychiatric: He has a normal mood and affect. His behavior is normal.       ASSESSMENT and PLAN    HLD: Results for Mirella Castañeda (MRN [id number]) as of 1/11/2019 08:13   Ref. Range 12/20/2018 08:19   Triglyceride Latest Ref Range: <150 MG/DL 50   Cholesterol, total Latest Ref Range: <200 MG/   HDL Cholesterol Latest Ref Range: 40 - 60 MG/DL 63 (H)   CHOL/HDL Ratio Latest Ref Range: 0 - 5.0   2.0   LDL, calculated Latest Ref Range: 0 - 100 MG/DL 51   VLDL, calculated Latest Units: MG/DL 10     History of coronary artery stent placement: 3/12 prox LAD AJITH    Patient is doing very well and stable from cardiac standpoint. He has lost significant weight. He has an active lifestyle.   Discussed the usual risk factors of cholesterol exercise and weight. Reduce metoprolol and monitor home BP chart. Diagnoses and all orders for this visit:    1. Coronary artery disease involving native coronary artery of native heart without angina pectoris  -     metoprolol tartrate (LOPRESSOR) 25 mg tablet; Take 1 Tab by mouth two (2) times a day. 2. Essential hypertension  -     AMB POC EKG ROUTINE W/ 12 LEADS, INTER & REP    3. History of coronary artery stent placement    4. Pure hypercholesterolemia        Pertinent laboratory and test data reviewed and discussed with patient. See patient instructions also for other medical advice given    Medications Discontinued During This Encounter   Medication Reason    metoprolol tartrate (LOPRESSOR) 50 mg tablet        Follow-up Disposition:  Return in about 1 year (around 1/11/2020), or if symptoms worsen or fail to improve, for with ekg.

## 2019-01-11 NOTE — LETTER
Satya Coffee 
1945 1/11/2019 Dear MD Agustín Ellington MD Zigmund Sauer, MD Joellyn Asp, MD 
 
I had the pleasure of evaluating  Mr. Nathalie Chavarria in office today. Below are the relevant portions of my assessment and plan of care. ICD-10-CM ICD-9-CM 1. Coronary artery disease involving native coronary artery of native heart without angina pectoris I25.10 414.01 metoprolol tartrate (LOPRESSOR) 25 mg tablet 2. Essential hypertension I10 401.9 AMB POC EKG ROUTINE W/ 12 LEADS, INTER & REP 3. History of coronary artery stent placement Z95.5 V45.82   
4. Pure hypercholesterolemia E78.00 272.0 Current Outpatient Medications Medication Sig Dispense Refill  metoprolol tartrate (LOPRESSOR) 25 mg tablet Take 1 Tab by mouth two (2) times a day. 180 Tab 1  
 atorvastatin (LIPITOR) 40 mg tablet TAKE 1 TABLET EVERY DAY 90 Tab 3  
 nitroglycerin (NITROSTAT) 0.4 mg SL tablet 1 Tab by SubLINGual route every five (5) minutes as needed for up to 3 doses. 25 Tab 0  Cholecalciferol, Vitamin D3, 1,000 unit cap Take  by mouth as needed.  vitamin a-vitamin c-vit e-min (OCUVITE) tablet Take 1 Tab by mouth daily.  aspirin 81 mg tablet Take 1 Tab by mouth daily. 90 Tab 3 Orders Placed This Encounter  AMB POC EKG ROUTINE W/ 12 LEADS, INTER & REP Order Specific Question:   Reason for Exam: Answer:   hypertension  metoprolol tartrate (LOPRESSOR) 25 mg tablet Sig: Take 1 Tab by mouth two (2) times a day. Dispense:  180 Tab Refill:  1 If you have questions, please do not hesitate to call me. I look forward to following Mr. Nathalie Chavarria along with you. Sincerely, Jung Marmolejo MD

## 2019-06-18 ENCOUNTER — OFFICE VISIT (OUTPATIENT)
Dept: UROLOGY | Age: 74
End: 2019-06-18

## 2019-06-18 ENCOUNTER — HOSPITAL ENCOUNTER (OUTPATIENT)
Dept: LAB | Age: 74
Discharge: HOME | End: 2019-06-18
Payer: MEDICARE

## 2019-06-18 VITALS
SYSTOLIC BLOOD PRESSURE: 132 MMHG | HEART RATE: 52 BPM | DIASTOLIC BLOOD PRESSURE: 76 MMHG | BODY MASS INDEX: 27.47 KG/M2 | WEIGHT: 186 LBS | OXYGEN SATURATION: 95 %

## 2019-06-18 DIAGNOSIS — C61 PROSTATE CANCER (HCC): ICD-10-CM

## 2019-06-18 DIAGNOSIS — C61 PROSTATE CANCER (HCC): Primary | ICD-10-CM

## 2019-06-18 LAB
BILIRUB UR QL STRIP: NEGATIVE
GLUCOSE UR-MCNC: NEGATIVE MG/DL
KETONES P FAST UR STRIP-MCNC: NEGATIVE MG/DL
PH UR STRIP: 7.5 [PH] (ref 4.6–8)
PROT UR QL STRIP: NEGATIVE
SP GR UR STRIP: 1.01 (ref 1–1.03)
UA UROBILINOGEN AMB POC: NORMAL (ref 0.2–1)
URINALYSIS CLARITY POC: CLEAR
URINALYSIS COLOR POC: YELLOW
URINE BLOOD POC: NEGATIVE
URINE LEUKOCYTES POC: NEGATIVE
URINE NITRITES POC: NEGATIVE

## 2019-06-18 PROCEDURE — 84153 ASSAY OF PSA TOTAL: CPT

## 2019-06-18 RX ORDER — PREDNISOLONE ACETATE 10 MG/ML
SUSPENSION/ DROPS OPHTHALMIC
COMMUNITY
Start: 2019-05-22 | End: 2019-12-27

## 2019-06-18 NOTE — PATIENT INSTRUCTIONS
Prostate-Specific Antigen (PSA) Test: About This Test 
What is it? A prostate-specific antigen (PSA) test measures the amount of PSA in your blood. PSA is released by a man's prostate gland into his blood. A high PSA level may mean that you have an enlargement, infection, or cancer of the prostate. Why is this test done? You may have this test to: · Check for prostate cancer. · Watch prostate cancer and see if treatment is working. How can you prepare for the test? 
Do not ejaculate during the 2 days before your PSA blood test, either during sex or masturbation. What happens before the test? 
Tell your doctor if you have had a: 
· Test to look at your bladder (cystoscopy) in the past several weeks. · Prostate biopsy in the past several weeks. · Prostate infection or urinary tract infection that has not gone away. · Tube (catheter) inserted into your bladder to drain urine recently. What happens during the test? 
A health professional takes a sample of your blood. What happens after the test? 
You can go back to your usual activities right away. When should you call for help? Watch closely for changes in your health, and be sure to contact your doctor if you have any questions about this test. 
Follow-up care is a key part of your treatment and safety. Be sure to make and go to all appointments, and call your doctor if you are having problems. It's also a good idea to keep a list of the medicines you take. Ask your doctor when you can expect to have your test results. Where can you learn more? Go to http://tere-bonifacio.info/. Enter Y970 in the search box to learn more about \"Prostate-Specific Antigen (PSA) Test: About This Test.\" Current as of: March 27, 2018 Content Version: 11.9 © 2254-0351 [employer].  Care instructions adapted under license by [employer] (which disclaims liability or warranty for this information). If you have questions about a medical condition or this instruction, always ask your healthcare professional. [place] [address] any warranty or liability for your use of this information.

## 2019-06-18 NOTE — PROGRESS NOTES
Misbah Leger    Chief Complaint   Patient presents with    Prostate Cancer       History and Physical    The patient is a 78-year-old male who is known to this office and who in 2015 underwent robotic prostatectomy for prostate cancer with a maximum [place] score of 7, 3+4, bilateral disease and a maximum pretreatment PSA of 6.6. The patient has been undergoing six-month PSA surveillance determinations and has been undetectable. Patient is not having any significant irritative or obstructive symptoms and his urinary control is adequate. The patient continues to have some difficulties with sexual activity. He does get reflex erections but is unable to achieve and sustain full erotic erections.     Past Medical History:   Diagnosis Date    Basal cell carcinoma     right arm Dr Kieran Bajwa CAD (coronary artery disease) 4/12    s/p stent LAD Dr. Seth Pitts Colon polyp     2007; Dr Emigdio Valdez 5/17    Essential hypertension     Frozen shoulder syndrome 2007    Dr. Kelby Leiva History of coronary artery stent placement 10/2/2017    3/12 LAD PCI    Hyperlipidemia     Hypertrophy of prostate with urinary obstruction and other lower urinary tract symptoms (LUTS)     IBS (irritable bowel syndrome)     Impaired fasting glucose 3/12    Obesity     peak weight 217 lbs, bmi 32 from 12/14; IF 1/18 start weight 190+    Phymatous rosacea     Prostate CA (HCC)     T2C [place] 7 Dr Fang Monroy 8/15    Squamous cell cancer of skin of nose 2016    s/p [facility]'s Dr Kieran Bajwa [place] 1980s     Patient Active Problem List   Diagnosis Code    Impaired fasting glucose R73.01    Hyperlipidemia E78.5    Prostate ca [facility]) [place] 7 s/p [facility] Care prostatectomy Dr Leisa Suarez 10/15 C61    Essential hypertension I10    Coronary artery disease involving native coronary artery without angina pectoris Dr Gibbons Phelps I25.10    Advance directive discussed with patient Z71.89    History of coronary artery stent placement Z95.5    Overweight (BMI 25.0-29. 9) E66.3     Past Surgical History:   Procedure Laterality Date    ABDOMEN SURGERY PROC UNLISTED  1988    inguinal hernia repair on left    HX COLONOSCOPY      Dr Thad Carey 2007 polyp; Dr Thad Carey 5/17 polyp    HX CORONARY STENT PLACEMENT  3/12    3/12 prox LAD AJITH    HX TONSILLECTOMY      HX UROLOGICAL  06/10    TURP    HX UROLOGICAL  05/10    TURP with biopsy 7/08, 5/10    HX UROLOGICAL  10/15    DaVinci prostatectomy Dr Waylon Silverio in [address] [place] [address], NEEDLE, SATURATION SAMPLING      06/10, 5/10, 07/08, 8/15     Current Outpatient Medications   Medication Sig Dispense Refill    prednisoLONE acetate (PRED FORTE) 1 % ophthalmic suspension       metoprolol tartrate (LOPRESSOR) 25 mg tablet Take 1 Tab by mouth two (2) times a day. 180 Tab 1    atorvastatin (LIPITOR) 40 mg tablet TAKE 1 TABLET EVERY DAY 90 Tab 3    nitroglycerin (NITROSTAT) 0.4 mg SL tablet 1 Tab by SubLINGual route every five (5) minutes as needed for up to 3 doses. 25 Tab 0    Cholecalciferol, Vitamin D3, 1,000 unit cap Take  by mouth as needed.  vitamin a-vitamin c-vit e-min (OCUVITE) tablet Take 1 Tab by mouth daily.  aspirin 81 mg tablet Take 1 Tab by mouth daily.  90 Tab 3     No Known Allergies  Social History     Socioeconomic History    Marital status:      Spouse name: Not on file    Number of children: 3    Years of education: Not on file    Highest education level: Not on file   Occupational History    Occupation: engr [employer]   Social Needs    Financial resource strain: Not on file    Food insecurity:     Worry: Not on file     Inability: Not on file    Transportation needs:     Medical: Not on file     Non-medical: Not on file   Tobacco Use    Smoking status: Never Smoker    Smokeless tobacco: Never Used   Substance and Sexual Activity    Alcohol use: Yes     Comment: greater than 15-20 12-ounce beers/week    Drug use: No    Sexual activity: Not on file   Lifestyle    Physical activity:     Days per week: Not on file     Minutes per session: Not on file    Stress: Not on file   Relationships    Social connections:     Talks on phone: Not on file     Gets together: Not on file     Attends Jainism service: Not on file     Active member of club or organization: Not on file     Attends meetings of clubs or organizations: Not on file     Relationship status: Not on file    Intimate partner violence:     Fear of current or ex partner: Not on file     Emotionally abused: Not on file     Physically abused: Not on file     Forced sexual activity: Not on file   Other Topics Concern    Not on file   Social History Narrative    Not on file      Family History   Problem Relation Age of Onset    Heart Disease Mother     Lung Disease Father     Dementia Father     Stroke Paternal Grandmother                  Visit Vitals  Wt 186 lb (84.4 kg)   BMI 27.47 kg/m²     Physical        Gen: WDWN adult NAD  Head  : normocephalic,  Normal ROM; eyes with normal pupils, EOMs, no masses;  conjunctiva normal  Neck: normal movement,  no evident mass,  No evident adenopathy, trachea midline,  Lungs: no respiratory distress or difficulties  CV:  No evident peripheral swelling  Abd :bowel sounds normal, no masses, tenderness, organomegaly  Flanks     -    Extremities- no edema, arthritis, deformity, swelling  Psych- oriented, no evident anxiety, no cognitive impairment evident    Lab Results   Component Value Date/Time    Prostate Specific Ag <0.1 12/10/2018 08:59 AM    Prostate Specific Ag <0.1 06/04/2018 09:32 AM    Prostate Specific Ag <0.1 12/18/2017 09:30 AM    Prostate Specific Ag <0.1 06/19/2017 11:00 AM    Prostate Specific Ag <0.1 12/14/2016 09:10 AM    Prostate Specific Ag <0.1 10/25/2016 12:00 PM    Prostate Specific Ag <0.1 04/19/2016 09:30 AM    Prostate Specific Ag <0.1 12/02/2015 03:00 PM    Prostate Specific Ag 6.6 (H) 07/16/2015 01:56 PM    Prostate Specific Ag 6.1 (H) 06/05/2015 08:30 AM Prostate Specific Ag 6.5 (H) 04/24/2015 10:02 AM    Prostate Specific Ag 5.8 (H) 04/18/2014 11:22 AM    Prostate Specific Ag 5.7 (H) 04/16/2013 04:21 PM    Prostate Specific Ag 5.5 (H) 02/28/2012 11:48 AM    Prostate Specific Ag 4.8 ng/mL 03/01/2011    PSA, Free 1.21 07/16/2015 01:56 PM    PSA, Free 1.10 06/05/2015 08:30 AM    PSA, % Free 18.3 07/16/2015 01:56 PM    PSA, % Free 18.0 06/05/2015 08:30 AM        Urinalysis is negative  PSA is drawn and I will call the patient with that result          Impression/ PLAN  [place] 7 prostate cancer with maximum PSA 6.6 now 4 years out from robotic prostatectomy with undetectable PSA to this point    Residual erectile dysfunction in a patient who is on traits. Plan:  I will call the patient with today's PSA  PSA in 6 months and the patient will be converted to annual PSA at the 5-year post prostatectomy anniversary  Again I have discussed with the patient the use of the vacuum erection device for rehabilitative and erection reasons. Literature is given            This visit exceeded 15 minutes and >50% was counselling  The patient understands the discussion and plan    PLEASE NOTE:      This document has been produced using voice recognition software.   Unrecognized errors in transcription may be present    Lima Larsen MD

## 2019-06-18 NOTE — PROGRESS NOTES
Mr. Bozena Tracy has a reminder for a \"due or due soon\" health maintenance. I have asked that he contact his primary care provider for follow-up on this health maintenance.

## 2019-06-19 LAB — PSA SERPL-MCNC: 0 NG/ML (ref 0–4)

## 2019-07-31 RX ORDER — ATORVASTATIN CALCIUM 40 MG/1
TABLET, FILM COATED ORAL
Qty: 90 TAB | Refills: 3 | Status: SHIPPED | OUTPATIENT
Start: 2019-07-31 | End: 2020-05-08

## 2019-08-20 DIAGNOSIS — I25.10 CORONARY ARTERY DISEASE INVOLVING NATIVE CORONARY ARTERY OF NATIVE HEART WITHOUT ANGINA PECTORIS: ICD-10-CM

## 2019-08-20 RX ORDER — METOPROLOL TARTRATE 25 MG/1
25 TABLET, FILM COATED ORAL 2 TIMES DAILY
Qty: 180 TAB | Refills: 1 | Status: SHIPPED | OUTPATIENT
Start: 2019-08-20 | End: 2020-01-08

## 2019-08-20 NOTE — TELEPHONE ENCOUNTER
Requested Prescriptions     Pending Prescriptions Disp Refills    metoprolol tartrate (LOPRESSOR) 25 mg tablet 180 Tab 1     Sig: Take 1 Tab by mouth two (2) times a day.

## 2019-10-28 ENCOUNTER — CLINICAL SUPPORT (OUTPATIENT)
Dept: INTERNAL MEDICINE CLINIC | Age: 74
End: 2019-10-28

## 2019-10-28 DIAGNOSIS — Z23 ENCOUNTER FOR IMMUNIZATION: ICD-10-CM

## 2019-10-28 NOTE — PATIENT INSTRUCTIONS
Vaccine Information Statement    Influenza (Flu) Vaccine (Inactivated or Recombinant): What You Need to Know    Many Vaccine Information Statements are available in [place] and other languages. See www.immunize.org/vis  Hojas de información sobre vacunas están disponibles en español y en muchos otros idiomas. Visite www.immunize.org/vis    1. Why get vaccinated? Influenza vaccine can prevent influenza (flu). Flu is a contagious disease that spreads around the [facility] every year, usually between October and May. Anyone can get the flu, but it is more dangerous for some people. Infants and young children, people 72years of age and older, pregnant women, and people with certain health conditions or a weakened immune system are at greatest risk of flu complications. Pneumonia, bronchitis, sinus infections and ear infections are examples of flu-related complications. If you have a medical condition, such as heart disease, cancer or diabetes, flu can make it worse. Flu can cause fever and chills, sore throat, muscle aches, fatigue, cough, headache, and runny or stuffy nose. Some people may have vomiting and diarrhea, though this is more common in children than adults. Each year thousands of people in the [facility] die from flu, and many more are hospitalized. Flu vaccine prevents millions of illnesses and flu-related visits to the doctor each year. 2. Influenza vaccines     CDC recommends everyone 10months of age and older get vaccinated every flu season. Children 6 months through 6years of age may need 2 doses during a single flu season. Everyone else needs only 1 dose each flu season. It takes about 2 weeks for protection to develop after vaccination. There are many flu viruses, and they are always changing. Each year a new flu vaccine is made to protect against three or four viruses that are likely to cause disease in the upcoming flu season.  Even when the vaccine doesnt exactly match these viruses, it may still provide some protection. Influenza vaccine does not cause flu. Influenza vaccine may be given at the same time as other vaccines. 3. Talk with your health care provider    Tell your vaccine provider if the person getting the vaccine:   Has had an allergic reaction after a previous dose of influenza vaccine, or has any severe, life-threatening allergies.  Has ever had Guillain-Barré Syndrome (also called GBS). In some cases, your health care provider may decide to postpone influenza vaccination to a future visit. People with minor illnesses, such as a cold, may be vaccinated. People who are moderately or severely ill should usually wait until they recover before getting influenza vaccine. Your health care provider can give you more information. 4. Risks of a reaction     Soreness, redness, and swelling where shot is given, fever, muscle aches, and headache can happen after influenza vaccine.  There may be a very small increased risk of Guillain-Barré Syndrome (GBS) after inactivated influenza vaccine (the flu shot). [facility] children who get the flu shot along with pneumococcal vaccine (PCV13), and/or DTaP vaccine at the same time might be slightly more likely to have a seizure caused by fever. Tell your health care provider if a child who is getting flu vaccine has ever had a seizure. People sometimes faint after medical procedures, including vaccination. Tell your provider if you feel dizzy or have vision changes or ringing in the ears. As with any medicine, there is a very remote chance of a vaccine causing a severe allergic reaction, other serious injury, or death. 5. What if there is a serious problem? An allergic reaction could occur after the vaccinated person leaves the clinic.  If you see signs of a severe allergic reaction (hives, swelling of the face and throat, difficulty breathing, a fast heartbeat, dizziness, or weakness), call 9-1-1 and get the person to the nearest hospital.    For other signs that concern you, call your health care provider. Adverse reactions should be reported to the Vaccine Adverse Event Reporting System (VAERS). Your health care provider will usually file this report, or you can do it yourself. Visit the VAERS website at www.vaers. [facility].gov or call 1-102.930.3776. VAERS is only for reporting reactions, and VAERS staff do not give medical advice. 6. The National Vaccine Injury Compensation Program    The [facility] Vaccine Injury Compensation Program (VICP) is a federal program that was created to compensate people who may have been injured by certain vaccines. Visit the VICP website at www.[facility].gov/vaccinecompensation or call 3-570.707.7093 to learn about the program and about filing a claim. There is a time limit to file a claim for compensation. 7. How can I learn more?  Ask your health care provider.  Call your local or state health department.  Contact the Centers for Disease Control and Prevention (CDC):  - Call 1-524.569.2474 (0-881-KLE-INFO) or  - Visit CDCs influenza website at www.cdc.gov/flu    Vaccine Information Statement (Interim)  Inactivated Influenza Vaccine   8/15/2019  [address]io Presser [id number]   Department of Health and Human Services  Centers for Disease Control and Prevention    Office Use Only

## 2019-10-28 NOTE — PROGRESS NOTES
Chief Complaint   Patient presents with    Immunization/Injection     Influenza vaccine     Patient given influenza vaccine, FLUAD, in left deltoid, per verbal order from Dr. Nikita Segundo with read back. Instructed patient to sit and wait 10-20 minutes before leaving the premises so that we can watch for any complications or adverse reactions. Patient given vaccine information statement handout before vaccine was given. Patient tolerated well without adverse reactions or complications.

## 2019-12-16 ENCOUNTER — APPOINTMENT (OUTPATIENT)
Dept: INTERNAL MEDICINE CLINIC | Age: 74
End: 2019-12-16

## 2019-12-16 ENCOUNTER — HOSPITAL ENCOUNTER (OUTPATIENT)
Dept: LAB | Age: 74
Discharge: HOME | End: 2019-12-16
Payer: MEDICARE

## 2019-12-16 DIAGNOSIS — I10 ESSENTIAL HYPERTENSION: ICD-10-CM

## 2019-12-16 DIAGNOSIS — E78.00 PURE HYPERCHOLESTEROLEMIA: ICD-10-CM

## 2019-12-16 LAB
ALBUMIN SERPL-MCNC: 3.9 G/DL (ref 3.4–5)
ALBUMIN/GLOB SERPL: 1.4 {RATIO} (ref 0.8–1.7)
ALP SERPL-CCNC: 84 U/L (ref 45–117)
ALT SERPL-CCNC: 45 U/L (ref 16–61)
ANION GAP SERPL CALC-SCNC: 3 MMOL/L (ref 3–18)
AST SERPL-CCNC: 32 U/L (ref 10–38)
BILIRUB SERPL-MCNC: 0.8 MG/DL (ref 0.2–1)
BUN SERPL-MCNC: 19 MG/DL (ref 7–18)
BUN/CREAT SERPL: 23 (ref 12–20)
CALCIUM SERPL-MCNC: 8.8 MG/DL (ref 8.5–10.1)
CHLORIDE SERPL-SCNC: 107 MMOL/L (ref 100–111)
CHOLEST SERPL-MCNC: 153 MG/DL
CO2 SERPL-SCNC: 31 MMOL/L (ref 21–32)
CREAT SERPL-MCNC: 0.84 MG/DL (ref 0.6–1.3)
ERYTHROCYTE [DISTWIDTH] IN BLOOD BY AUTOMATED COUNT: 14 % (ref 11.6–14.5)
GLOBULIN SER CALC-MCNC: 2.7 G/DL (ref 2–4)
GLUCOSE SERPL-MCNC: 112 MG/DL (ref 74–99)
HCT VFR BLD AUTO: 44.8 % (ref 36–48)
HDLC SERPL-MCNC: 74 MG/DL (ref 40–60)
HDLC SERPL: 2.1 {RATIO} (ref 0–5)
HGB BLD-MCNC: 14.3 G/DL (ref 13–16)
LDLC SERPL CALC-MCNC: 70.4 MG/DL (ref 0–100)
LIPID PROFILE,FLP: ABNORMAL
MCH RBC QN AUTO: 31.2 PG (ref 24–34)
MCHC RBC AUTO-ENTMCNC: 31.9 G/DL (ref 31–37)
MCV RBC AUTO: 97.8 FL (ref 74–97)
PLATELET # BLD AUTO: 186 K/UL (ref 135–420)
PMV BLD AUTO: 10.8 FL (ref 9.2–11.8)
POTASSIUM SERPL-SCNC: 5.1 MMOL/L (ref 3.5–5.5)
PROT SERPL-MCNC: 6.6 G/DL (ref 6.4–8.2)
RBC # BLD AUTO: 4.58 M/UL (ref 4.7–5.5)
SODIUM SERPL-SCNC: 141 MMOL/L (ref 136–145)
TRIGL SERPL-MCNC: 43 MG/DL (ref ?–150)
VLDLC SERPL CALC-MCNC: 8.6 MG/DL
WBC # BLD AUTO: 5.6 K/UL (ref 4.6–13.2)

## 2019-12-16 PROCEDURE — 36415 COLL VENOUS BLD VENIPUNCTURE: CPT

## 2019-12-16 PROCEDURE — 85027 COMPLETE CBC AUTOMATED: CPT

## 2019-12-16 PROCEDURE — 80061 LIPID PANEL: CPT

## 2019-12-16 PROCEDURE — 80053 COMPREHEN METABOLIC PANEL: CPT

## 2019-12-24 NOTE — PROGRESS NOTES
69 y.o. WHITE OR  male who presents for f/u    Denies any cardiovascular complaints and he sees Dr. Willy Dior yearly now. Walks, plays golf and table tennis, lawn work and no problems to report    Denies polyuria, polydipsia, nocturia, vision change. Not checking sugars at this time.   He came off the self imposed fasting regimen and gained back 10 lbs, plans on restarting that with his wife at the beginning of the year    Vitals 12/27/2019 6/18/2019 1/11/2019 12/27/2018 12/10/2018   Weight 185 lb 186 lb 178 lb 179 lb 181 lb     He underwent DaVinci prostatectomy by Dr Samantha oSl in [facility] 10/15, [facility] Dr Rajan Melvin for the yearly f/u mid 2020     No gi complaints    He will get the left cataract done early 2020 by Dr Larry Alicea apparently    [address]: 6/12/15, 6/20/16, 12/22/17, 12/27/18, 12/27/19    IF 1/18    Past Medical History:   Diagnosis Date    Basal cell carcinoma     right arm Dr Jarrett Mcqueen CAD (coronary artery disease) 4/12    s/p stent LAD Dr. Rossy Pandey Colon polyp     2007; Dr Dora Rubio 5/17    Essential hypertension     Frozen shoulder syndrome 2007    Dr. Roney Henderson History of coronary artery stent placement 10/2/2017    3/12 LAD PCI    Hyperlipidemia     Hypertrophy of prostate with urinary obstruction and other lower urinary tract symptoms (LUTS)     IBS (irritable bowel syndrome)     Impaired fasting glucose 3/12    Obesity     peak weight 217 lbs, bmi 32 from 12/14; IF 1/18 start weight 190+    Phymatous rosacea     Prostate CA (HCC)     T2C [place] 7 Dr Rajan Melvin 8/15    Squamous cell cancer of skin of nose 2016    s/p Moh's Dr Jarrett Mcqueen [place] 1980s     Past Surgical History:   Procedure Laterality Date    ABDOMEN SURGERY PROC UNLISTED  1988    inguinal hernia repair on left    HX CATARACT REMOVAL      Dr Larry Alicea RIGHT 2019, LEFT 2020    HX COLONOSCOPY      Dr Dora Rubio 2007 polyp; Dr Dora Rubio 5/17 polyp    HX CORONARY STENT PLACEMENT  3/12    3/12 prox LAD AJITH    HX TONSILLECTOMY      HX UROLOGICAL  06/10    TURP    HX UROLOGICAL  05/10    TURP with biopsy 7/08, 5/10    HX UROLOGICAL  10/15    DaVinci prostatectomy Dr Katy Riojas in [address] [place] [address], NEEDLE, SATURATION SAMPLING      06/10, 5/10, 07/08, 8/15     Social History     Socioeconomic History    Marital status:      Spouse name: Not on file    Number of children: 3    Years of education: Not on file    Highest education level: Not on file   Occupational History    Occupation: engr [employer]   Social Needs    Financial resource strain: Not on file    Food insecurity:     Worry: Not on file     Inability: Not on file    Transportation needs:     Medical: Not on file     Non-medical: Not on file   Tobacco Use    Smoking status: Never Smoker    Smokeless tobacco: Never Used   Substance and Sexual Activity    Alcohol use: Yes     Comment: greater than 15-20 12-ounce beers/week    Drug use: No    Sexual activity: Not on file   Lifestyle    Physical activity:     Days per week: Not on file     Minutes per session: Not on file    Stress: Not on file   Relationships    Social connections:     Talks on phone: Not on file     Gets together: Not on file     Attends Jainism service: Not on file     Active member of club or organization: Not on file     Attends meetings of clubs or organizations: Not on file     Relationship status: Not on file    Intimate partner violence:     Fear of current or ex partner: Not on file     Emotionally abused: Not on file     Physically abused: Not on file     Forced sexual activity: Not on file   Other Topics Concern    Not on file   Social History Narrative    Not on file     Current Outpatient Medications   Medication Sig    metoprolol tartrate (LOPRESSOR) 25 mg tablet Take 1 Tab by mouth two (2) times a day.     atorvastatin (LIPITOR) 40 mg tablet TAKE 1 TABLET EVERY DAY    nitroglycerin (NITROSTAT) 0.4 mg SL tablet 1 Tab by SubLINGual route every five (5) minutes as needed for up to 3 doses.  Cholecalciferol, Vitamin D3, 1,000 unit cap Take  by mouth as needed.  vitamin a-vitamin c-vit e-min (OCUVITE) tablet Take 1 Tab by mouth daily.  aspirin 81 mg tablet Take 1 Tab by mouth daily. No current facility-administered medications for this visit. No Known Allergies    REVIEW OF SYSTEMS: colo 5/17 Dr Edison Ventura, sees Dr Yusef Paul  no vision change or eye pain  Oral  no mouth pain, tongue or tooth problems  Ears  no hearing loss, ear pain, fullness, no swallowing problems  Cardiac  no CP, PND, orthopnea, edema, palpitations or syncope  Chest  no breast masses  Resp  no wheezing, chronic coughing, dyspnea  GI  no heartburn, nausea, vomiting, change in bowel habits, bleeding, hemorrhoids  Urinary  no dysuria, hematuria, flank pain, urgency, frequency    Visit Vitals  /83   Pulse (!) 57   Temp 98.4 °F (36.9 °C) (Oral)   Resp 14   Ht 5' 9\" (1.753 m)   Wt 185 lb (83.9 kg)   SpO2 98%   BMI 27.32 kg/m²     Affect is appropriate. Mood stable  No apparent distress  HEENT --Anicteric sclerae. No thyromegaly, JVD, or bruits. Lungs --Clear to auscultation and percussion, normal percussion. Heart --Regular rate and rhythm, no murmurs, rubs, gallops, or clicks. Abdomen -- Soft and nontender, no hepatosplenomegaly or masses. Extremities -- Without cyanosis, clubbing, edema. 2+ pulses equally and bilaterally.     LABS  From 5/10 showed   gluc 94,   cr 0.90, gfr>60,           wbc 5.4, hb 14.7, plt 185  From 6/10 showed   gluc 107, cr 1.00  From 2/12 showed                                   psa 5.5  From 4/13 showed                                     psa 5.7  From 6/13 showed   gluc 112, cr 0.89, gfr 89,   alt 14,           chol 132, tg 64, hdl 46, ldl-c 73, wbc 5.9, hb 14.2, plt 208, ua neg  From 11/13 showed                    hba1c 5.7, chol 120, tg 48, hdl 47, ldl-c 63  From 6/14 showed         hba1c 5.8, chol 130, tg 54, hdl 51, ldl-c 68, wbc 5.8, hb 14.5, plt 177  From 12/14 showed gluc 117, cr 0.80, gfr>60,  alt 19  hba1c 5.9, chol 138, tg 66, hdl 52, ldl-c 73  From 6/15 showed   gluc 104, cr 0.87, gfr>60,     hba1c 5.7,      wbc 4.8, hb 14.2, plt 161, psa 6.1  From 9/15 showed   gluc 84,   cr 1.01, gfr 76,   alt 34,       wbc 6.1, hb 13.9, plt 199, ua neg pro, inr 1.0 ptt 28, urine cx neg, ast 24  From 12/15 showed gluc 110, cr 0.97, gfr>60,    hba1c 5.6, chol 130, tg 58, hdl 59, ldl-c 59, wbc 6.0, hb 13.9, plt 197, psa<0.1  From 6/16 showed   gluc 113, cr 0.91, gfr>60,     hba1c 5.6   From 12/16 showed                     psa<0.1  From 6/17 showed                     psa<0.1  From 12/17 showed gluc 93,   cr 0.93, gfr>60,  alt 30,           chol 127, tg 63, hdl 71, ldl-c 43, wbc 5.3, hb 16.9, plt 181, psa<0.1  From 12/18 showed gluc 96,   cr 0.84, gfr>60,  alt 33,           chol 124, tg 50, hdl 63, ldl-c 51, wbc 6.9, hb 13.5, plt 174    Results for orders placed or performed during the hospital encounter of 12/16/19   CBC W/O DIFF   Result Value Ref Range    WBC 5.6 4.6 - 13.2 K/uL    RBC 4.58 (L) 4.70 - 5.50 M/uL    HGB 14.3 13.0 - 16.0 g/dL    HCT 44.8 36.0 - 48.0 %    MCV 97.8 (H) 74.0 - 97.0 FL    MCH 31.2 24.0 - 34.0 PG    MCHC 31.9 31.0 - 37.0 g/dL    RDW 14.0 11.6 - 14.5 %    PLATELET 673 383 - 121 K/uL    MPV 10.8 9.2 - 44.0 FL   METABOLIC PANEL, COMPREHENSIVE   Result Value Ref Range    Sodium 141 136 - 145 mmol/L    Potassium 5.1 3.5 - 5.5 mmol/L    Chloride 107 100 - 111 mmol/L    CO2 31 21 - 32 mmol/L    Anion gap 3 3.0 - 18 mmol/L    Glucose 112 (H) 74 - 99 mg/dL    BUN 19 (H) 7.0 - 18 MG/DL    Creatinine 0.84 0.6 - 1.3 MG/DL    BUN/Creatinine ratio 23 (H) 12 - 20      GFR est AA >60 >60 ml/min/1.73m2    GFR est non-AA >60 >60 ml/min/1.73m2    Calcium 8.8 8.5 - 10.1 MG/DL    Bilirubin, total 0.8 0.2 - 1.0 MG/DL    ALT (SGPT) 45 16 - 61 U/L    AST (SGOT) 32 10 - 38 U/L    Alk.  phosphatase 84 45 - 117 U/L Protein, total 6.6 6.4 - 8.2 g/dL    Albumin 3.9 3.4 - 5.0 g/dL    Globulin 2.7 2.0 - 4.0 g/dL    A-G Ratio 1.4 0.8 - 1.7     LIPID PANEL   Result Value Ref Range    LIPID PROFILE          Cholesterol, total 153 <200 MG/DL    Triglyceride 43 <150 MG/DL    HDL Cholesterol 74 (H) 40 - 60 MG/DL    LDL, calculated 70.4 0 - 100 MG/DL    VLDL, calculated 8.6 MG/DL    CHOL/HDL Ratio 2.1 0 - 5.0       Patient Active Problem List   Diagnosis Code    Impaired fasting glucose R73.01    Hyperlipidemia E78.5    Essential hypertension I10    Coronary artery disease involving native coronary artery without angina pectoris Dr Tristin Barnes I25.10    Advance directive discussed with patient Z71.89    History of coronary artery stent placement Z95.5    Overweight (BMI 25.0-29. 9) E66.3    History of prostate cancer Z85.46     Assessment and plan:  1. H/O prostate ca. Per [facility]  2. IFG. Lifestyle and dietary measures   3. HLP. Continue statin  4. CHD. F/U Dr. Tristin Barnes, continue current  5. HTN. Continue current regimen. 6. Overweight. Restart IF, dietary and lifestyle measures        RTC 12/20    Above conditions discussed at length and patient vocalized understanding.   All questions answered to patient satisfaction

## 2019-12-24 NOTE — PROGRESS NOTES
This is a subsequent Medicare Annual Wellness Exam     I have reviewed the patient's medical history in detail and updated the computerized patient record. History     Past Medical History:   Diagnosis Date    Basal cell carcinoma     right arm Dr Shahrzad Patel CAD (coronary artery disease) 4/12    s/p stent LAD Dr. Roosevelt Montoya Colon polyp     2007; Dr Edison Ventura 5/17    Essential hypertension     Frozen shoulder syndrome 2007    Dr. Angela Son History of coronary artery stent placement 10/2/2017    3/12 LAD PCI    Hyperlipidemia     Hypertrophy of prostate with urinary obstruction and other lower urinary tract symptoms (LUTS)     IBS (irritable bowel syndrome)     Impaired fasting glucose 3/12    Obesity     peak weight 217 lbs, bmi 32 from 12/14; IF 1/18 start weight 190+    Phymatous rosacea     Prostate CA (HCC)     T2C [place] 7 Dr Leslye Motta 8/15    Squamous cell cancer of skin of nose 2016    s/p Moh's Dr Shahrzad Patel [place] 1980s      Past Surgical History:   Procedure Laterality Date    ABDOMEN SURGERY PROC UNLISTED  1988    inguinal hernia repair on left    HX CATARACT REMOVAL      Dr Carlos Gil 2019, LEFT 2020    HX COLONOSCOPY      Dr Edison Ventura 2007 polyp; Dr Edison Ventura 5/17 polyp    HX CORONARY STENT PLACEMENT  3/12    3/12 prox LAD AJITH    HX TONSILLECTOMY      HX UROLOGICAL  06/10    TURP    HX UROLOGICAL  05/10    TURP with biopsy 7/08, 5/10    HX UROLOGICAL  10/15    DaVinci prostatectomy Dr Eliud Garcia in [address] [place] [address], [facility], SATURATION SAMPLING      06/10, 5/10, 07/08, 8/15     Current Outpatient Medications   Medication Sig Dispense Refill    metoprolol tartrate (LOPRESSOR) 25 mg tablet Take 1 Tab by mouth two (2) times a day. 180 Tab 1    atorvastatin (LIPITOR) 40 mg tablet TAKE 1 TABLET EVERY DAY 90 Tab 3    nitroglycerin (NITROSTAT) 0.4 mg SL tablet 1 Tab by SubLINGual route every five (5) minutes as needed for up to 3 doses.  25 Tab 0    Cholecalciferol, Vitamin D3, 1,000 unit cap Take  by mouth as needed.  vitamin a-vitamin c-vit e-min (OCUVITE) tablet Take 1 Tab by mouth daily.  aspirin 81 mg tablet Take 1 Tab by mouth daily. 80 Tab 3     No Known Allergies     Family History   Problem Relation Age of Onset    Heart Disease Mother     Lung Disease Father     Dementia Father     Stroke Paternal Grandmother      Social History     Tobacco Use    Smoking status: Never Smoker    Smokeless tobacco: Never Used   Substance Use Topics    Alcohol use: Yes     Comment: greater than 15-20 12-ounce beers/week     Depression Risk Factor Screening:     3 most recent PHQ Screens 12/27/2019   Little interest or pleasure in doing things Not at all   Feeling down, depressed, irritable, or hopeless Not at all   Total Score PHQ 2 0     SCREENINGS  Colonoscopy last done 5/17 Dr Ely Mobley    Immunization History   Administered Date(s) Administered    (RETIRED) Pneumococcal Vaccine (Unspecified Type) 01/01/2009    Influenza High Dose Vaccine PF 12/06/2016, 10/16/2017    Influenza Vaccine (Tri) Adjuvanted 11/16/2018, 10/28/2019    Influenza Vaccine PF 10/31/2014    Influenza Vaccine Whole 01/01/2010    Pneumococcal Conjugate (PCV-13) 06/09/2014    Pneumococcal Polysaccharide (PPSV-23) 10/31/2014    TD Vaccine 06/23/2009    Zoster Vaccine, Live 01/01/2011     Alcohol Risk Factor Screening: On any occasion during the past 3 months, have you had more than 4 drinks containing alcohol? No    Do you average more than 14 drinks per week? No    Functional Ability and Level of Safety:     Hearing Loss   normal-to-mild    Vision - no acute complaints and is followed by Dr Suly Rivera of Daily Living   Self-care. Requires assistance with: no ADLs    Fall Risk     Fall Risk Assessment, last 12 mths 6/18/2019   Able to walk? Yes   Fall in past 12 months?  No     Abuse Screen   Patient is not abused    Review of Systems   A comprehensive review of systems was negative except for that written in the HPI. Physical Examination     Visit Vitals  /83   Pulse (!) 57   Temp 98.4 °F (36.9 °C) (Oral)   Resp 14   Ht 5' 9\" (1.753 m)   Wt 185 lb (83.9 kg)   SpO2 98%   BMI 27.32 kg/m²     Evaluation of Cognitive Function:  Mood/affect:  happy  Appearance: age appropriate, overweight, within normal Limits and younger than stated age  Family member/caregiver input: na    Patient Care Team:  Anu Dunn MD as PCP - General (Internal Medicine)  Anu Dunn MD as PCP - [facility]  Cherelle Springer MD as Physician (Urology)  Mark Wilkerson MD (Urology)  Zeb Gonzalez MD as Physician (Urology)    Advice/Referrals/Counseling   Education and counseling provided:  Are appropriate based on today's review and evaluation  End-of-Life planning (with patient's consent)  Pneumococcal Vaccine  Influenza Vaccine  Prostate cancer screening tests (PSA, covered annually)  Colorectal cancer screening tests  Cardiovascular screening blood test  Diabetes screening test    Assessment/Plan       ICD-10-CM ICD-9-CM    1. Medicare annual wellness visit, subsequent Z00.00 V70.0    2. Impaired fasting glucose R73.01 790.21    3. History of coronary artery stent placement Z95.5 V45.82    4. Essential hypertension I10 401.9    5. Overweight (BMI 25.0-29. 9) E66.3 278.02    6. Pure hypercholesterolemia E78.00 272.0 CBC W/O DIFF      LIPID PANEL      METABOLIC PANEL, COMPREHENSIVE   7. Coronary artery disease involving native coronary artery of native heart without angina pectoris I25.10 414.01    8. History of prostate cancer Z85.46 V10.46    9. Advanced directives, counseling/discussion Z71.89 V65.49 ADVANCE CARE PLANNING FIRST 30 MINS   10. Screening for alcoholism Z13.39 V79.1 [place] ANNUAL ALCOHOL SCREEN 15 MIN   11. Screening for depression Z13.31 V79.0 [place] [address]   12.  Screening for diabetes mellitus Z13.1 V77.1      current treatment plan is effective, no change in therapy  lab results and schedule of future lab studies reviewed with patient  reviewed diet, exercise and weight control  cardiovascular risk and specific lipid/LDL goals reviewed. End of life discussion undertaken.   He will work on getting medical directive in place  shahana advocated  Colonoscopy to be scheduled 2027

## 2019-12-27 ENCOUNTER — OFFICE VISIT (OUTPATIENT)
Dept: INTERNAL MEDICINE CLINIC | Age: 74
End: 2019-12-27

## 2019-12-27 VITALS
WEIGHT: 185 LBS | SYSTOLIC BLOOD PRESSURE: 124 MMHG | HEART RATE: 57 BPM | TEMPERATURE: 98.4 F | HEIGHT: 69 IN | OXYGEN SATURATION: 98 % | DIASTOLIC BLOOD PRESSURE: 83 MMHG | BODY MASS INDEX: 27.4 KG/M2 | RESPIRATION RATE: 14 BRPM

## 2019-12-27 DIAGNOSIS — E78.00 PURE HYPERCHOLESTEROLEMIA: ICD-10-CM

## 2019-12-27 DIAGNOSIS — I10 ESSENTIAL HYPERTENSION: ICD-10-CM

## 2019-12-27 DIAGNOSIS — R73.01 IMPAIRED FASTING GLUCOSE: ICD-10-CM

## 2019-12-27 DIAGNOSIS — E66.3 OVERWEIGHT (BMI 25.0-29.9): ICD-10-CM

## 2019-12-27 DIAGNOSIS — Z00.00 MEDICARE ANNUAL WELLNESS VISIT, SUBSEQUENT: Primary | ICD-10-CM

## 2019-12-27 DIAGNOSIS — Z71.89 ADVANCED DIRECTIVES, COUNSELING/DISCUSSION: ICD-10-CM

## 2019-12-27 DIAGNOSIS — Z13.31 SCREENING FOR DEPRESSION: ICD-10-CM

## 2019-12-27 DIAGNOSIS — Z85.46 HISTORY OF PROSTATE CANCER: ICD-10-CM

## 2019-12-27 DIAGNOSIS — Z95.5 HISTORY OF CORONARY ARTERY STENT PLACEMENT: ICD-10-CM

## 2019-12-27 DIAGNOSIS — Z13.1 SCREENING FOR DIABETES MELLITUS: ICD-10-CM

## 2019-12-27 DIAGNOSIS — I25.10 CORONARY ARTERY DISEASE INVOLVING NATIVE CORONARY ARTERY OF NATIVE HEART WITHOUT ANGINA PECTORIS: ICD-10-CM

## 2019-12-27 DIAGNOSIS — Z13.39 SCREENING FOR ALCOHOLISM: ICD-10-CM

## 2019-12-27 NOTE — PROGRESS NOTES
Shakir Underwood presents today for   Chief Complaint   Patient presents with    Cholesterol Problem     follow up with labs              Depression Screening:  3 most recent PHQ Screens 6/18/2019   Little interest or pleasure in doing things Not at all   Feeling down, depressed, irritable, or hopeless Not at all   Total Score PHQ 2 0       Learning Assessment:  Learning Assessment 12/22/2017   PRIMARY LEARNER Patient   HIGHEST LEVEL OF EDUCATION - PRIMARY LEARNER  -   BARRIERS PRIMARY LEARNER NONE   CO-LEARNER CAREGIVER No   PRIMARY LANGUAGE ENGLISH   LEARNER PREFERENCE PRIMARY OTHER (COMMENT)   ANSWERED BY patient   RELATIONSHIP SELF       Abuse Screening:  Abuse Screening Questionnaire 6/18/2019   Do you ever feel afraid of your partner? N   Are you in a relationship with someone who physically or mentally threatens you? N   Is it safe for you to go home? Y       Fall Risk  Fall Risk Assessment, last 12 mths 6/18/2019   Able to walk? Yes   Fall in past 12 months? No     Health Maintenance Due   Topic Date Due    Shingrix Vaccine Age 49> (1 of 2) 12/12/1996    GLAUCOMA SCREENING Q2Y  05/10/2019    MEDICARE YEARLY EXAM  12/28/2019       coordination of Care:  1. Have you been to the ER, urgent care clinic since your last visit? Hospitalized since your last visit? no    2. Have you seen or consulted any other health care providers outside of the [address] [place] [address] since your last visit? Include any pap smears or colon screening.  no

## 2019-12-27 NOTE — PATIENT INSTRUCTIONS
Medicare Wellness Visit, Male    The best way to live healthy is to have a lifestyle where you eat a well-balanced diet, exercise regularly, limit alcohol use, and quit all forms of tobacco/nicotine, if applicable. Regular preventive services are another way to keep healthy. Preventive services (vaccines, screening tests, monitoring & exams) can help personalize your care plan, which helps you manage your own care. Screening tests can find health problems at the earliest stages, when they are easiest to treat. [facility] follows the current, evidence-based guidelines published by the [facility] ([facility]) when recommending preventive services for our patients. Because we follow these guidelines, sometimes recommendations change over time as research supports it. (For example, a prostate screening blood test is no longer routinely recommended for men with no symptoms). Of course, you and your doctor may decide to screen more often for some diseases, based on your risk and co-morbidities (chronic disease you are already diagnosed with). Preventive services for you include:  - Medicare offers their members a free annual wellness visit, which is time for you and your primary care provider to discuss and plan for your preventive service needs. Take advantage of this benefit every year!  -All adults over age 72 should receive the recommended pneumonia vaccines. Current USPSTF guidelines recommend a series of two vaccines for the best pneumonia protection.   -All adults should have a flu vaccine yearly and tetanus vaccine every 10 years.  -All adults age 48 and older should receive the shingles vaccines (series of two vaccines).        -All adults age 38-68 who are overweight should have a diabetes screening test once every three years.   -Other screening tests & preventive services for persons with diabetes include: an eye exam to screen for diabetic retinopathy, a kidney function test, a foot exam, and stricter control over your cholesterol.   -Cardiovascular screening for adults with routine risk involves an electrocardiogram (ECG) at intervals determined by the provider.   -Colorectal cancer screening should be done for adults age 54-65 with no increased risk factors for colorectal cancer. There are a number of acceptable methods of screening for this type of cancer. Each test has its own benefits and drawbacks. Discuss with your provider what is most appropriate for you during your annual wellness visit. The different tests include: colonoscopy (considered the best screening method), a fecal occult blood test, a fecal DNA test, and sigmoidoscopy.  -All adults born between [facility] should be screened once for Hepatitis C.  -An Abdominal Aortic Aneurysm (AAA) Screening is recommended for men age 73-68 who has ever smoked in their lifetime.      Here is a list of your current Health Maintenance items (your personalized list of preventive services) with a due date:  Health Maintenance Due   Topic Date Due    Shingles Vaccine (1 of 2) 12/12/1996    Glaucoma Screening   05/10/2019    Annual Well Visit  12/28/2019

## 2019-12-27 NOTE — ACP (ADVANCE CARE PLANNING)
Advance Care Planning    Advance Care Planning (ACP) Provider Note - Comprehensive     Date of ACP Conversation: 12/27/19  Persons included in Conversation:  patient  Length of ACP Conversation in minutes:  16 minutes    Authorized Decision Maker (if patient is incapable of making informed decisions): This person is: wife  Other Legally Authorized Decision Maker (e.g. Next of Kin)          General ACP for ALL Patients with Decision Making Capacity:   Importance of advance care planning, including choosing a healthcare agent to communicate patient's healthcare decisions if patient lost the ability to make decisions, such as after a sudden illness or accident  Understanding of the healthcare agent role was assessed and information provided    Review of Existing Advance Directive:  na    For Serious or Chronic Illness:  Understanding of medical condition    Understanding of CPR, goals and expected outcomes, benefits and burdens discussed.     Interventions Provided:  Recommended completion of Advance Directive form after review of ACP materials and conversation with prospective healthcare agent   Recommended communicating the plan and making copies for the healthcare agent, personal physician, and others as appropriate (e.g., health system)  Recommended review of completed ACP document annually or upon change in health status

## 2020-01-07 DIAGNOSIS — I25.10 CORONARY ARTERY DISEASE INVOLVING NATIVE CORONARY ARTERY OF NATIVE HEART WITHOUT ANGINA PECTORIS: ICD-10-CM

## 2020-01-08 RX ORDER — METOPROLOL TARTRATE 25 MG/1
TABLET, FILM COATED ORAL
Qty: 180 TAB | Refills: 1 | Status: SHIPPED | OUTPATIENT
Start: 2020-01-08 | End: 2020-05-26

## 2020-01-23 ENCOUNTER — OFFICE VISIT (OUTPATIENT)
Dept: CARDIOLOGY CLINIC | Age: 75
End: 2020-01-23

## 2020-01-23 VITALS
SYSTOLIC BLOOD PRESSURE: 153 MMHG | WEIGHT: 182 LBS | BODY MASS INDEX: 26.96 KG/M2 | HEIGHT: 69 IN | DIASTOLIC BLOOD PRESSURE: 78 MMHG | HEART RATE: 55 BPM

## 2020-01-23 DIAGNOSIS — I25.10 CORONARY ARTERY DISEASE INVOLVING NATIVE CORONARY ARTERY OF NATIVE HEART WITHOUT ANGINA PECTORIS: Primary | ICD-10-CM

## 2020-01-23 DIAGNOSIS — Z95.5 HISTORY OF CORONARY ARTERY STENT PLACEMENT: ICD-10-CM

## 2020-01-23 DIAGNOSIS — I10 ESSENTIAL HYPERTENSION: ICD-10-CM

## 2020-01-23 DIAGNOSIS — E78.00 PURE HYPERCHOLESTEROLEMIA: ICD-10-CM

## 2020-01-23 RX ORDER — NITROGLYCERIN 0.4 MG/1
0.4 TABLET SUBLINGUAL
Qty: 25 TAB | Refills: 0 | Status: SHIPPED | OUTPATIENT
Start: 2020-01-23 | End: 2020-02-17

## 2020-01-23 NOTE — PROGRESS NOTES
HISTORY OF PRESENT ILLNESS María Mccartney is a 69 y.o. male. F/u CAD, old LAD PCI, HTN, HLD Patient denies significant chest pain, SOB, palpitations, edema, dizziness Cholesterol Problem The history is provided by the medical records. This is a chronic problem. Pertinent negatives include no chest pain, no headaches and no shortness of breath. Hypertension The history is provided by the medical records. This is a chronic problem. Pertinent negatives include no chest pain, no headaches and no shortness of breath. Review of Systems Constitutional: Negative for chills, fever, malaise/fatigue and weight loss. HENT: Negative for nosebleeds. Eyes: Negative for discharge. Respiratory: Negative for cough, shortness of breath and wheezing. Cardiovascular: Negative for chest pain, palpitations, orthopnea, claudication, leg swelling and PND. Gastrointestinal: Negative for diarrhea, nausea and vomiting. Genitourinary: Negative for dysuria and hematuria. Musculoskeletal: Negative for joint pain. Skin: Negative for rash. Neurological: Negative for dizziness, seizures, loss of consciousness and headaches. Endo/Heme/Allergies: Negative for polydipsia. Does not bruise/bleed easily. Psychiatric/Behavioral: Negative for depression and substance abuse. The patient does not have insomnia. No Known Allergies Past Medical History:  
Diagnosis Date  Basal cell carcinoma   
 right arm Dr Bryant Nunez  CAD (coronary artery disease) 4/12  
 s/p stent LAD Dr. Licea Channel  Colon polyp 2007; Dr Ingrid Zamora 5/17  Essential hypertension  Frozen shoulder syndrome 2007 Dr. Rosita Rowell  History of coronary artery stent placement 10/2/2017  
 3/12 LAD PCI  Hyperlipidemia  Hypertrophy of prostate with urinary obstruction and other lower urinary tract symptoms (LUTS)  IBS (irritable bowel syndrome)  Impaired fasting glucose 3/12  Obesity peak weight 217 lbs, bmi 32 from 12/14; IF 1/18 start weight 190+  Phymatous rosacea  Prostate CA ([address] T2C [place] 7 Dr Stevo Cherry 8/15  Squamous cell cancer of skin of nose 2016  
 s/p [facility]'s Dr Thomas Daniel  Zoster 1980s Family History Problem Relation Age of Onset  Heart Disease Mother  Lung Disease Father  Dementia Father  Stroke Paternal Grandmother Social History Tobacco Use  Smoking status: Never Smoker  Smokeless tobacco: Never Used Substance Use Topics  Alcohol use: Yes Comment: greater than 15-20 12-ounce beers/week  Drug use: No  
  
 
Current Outpatient Medications Medication Sig  
 metoprolol tartrate (LOPRESSOR) 25 mg tablet TAKE 1 TABLET TWICE DAILY  atorvastatin (LIPITOR) 40 mg tablet TAKE 1 TABLET EVERY DAY  nitroglycerin (NITROSTAT) 0.4 mg SL tablet 1 Tab by SubLINGual route every five (5) minutes as needed for up to 3 doses.  Cholecalciferol, Vitamin D3, 1,000 unit cap Take  by mouth as needed.  vitamin a-vitamin c-vit e-min (OCUVITE) tablet Take 1 Tab by mouth daily.  aspirin 81 mg tablet Take 1 Tab by mouth daily. No current facility-administered medications for this visit. Past Surgical History:  
Procedure Laterality Date  ABDOMEN SURGERY PROC UNLISTED  1988  
 inguinal hernia repair on left  HX CATARACT REMOVAL Dr Quentin Rebollar 2019, LEFT 2020  HX COLONOSCOPY Dr Alexandra Goodrich 2007 polyp; Dr Alexandra Goodrich 5/17 polyp  HX CORONARY STENT PLACEMENT  3/12  
 3/12 prox LAD AJITH  
 HX TONSILLECTOMY  HX UROLOGICAL  06/10 TURP  
 HX UROLOGICAL  05/10 TURP with biopsy 7/08, 5/10  
 HX UROLOGICAL  10/15 [facility]inc[facility] prostatectomy Dr Blank Yanez in [facility]  PA PROSTATE BIOPSY, NEEDLE, SATURATION SAMPLING    
 06/10, 5/10, 07/08, 8/15 Diagnostic Studies: No flowsheet data found. 1/18 Nuc Stress Conclusion: 1. Normal perfusion scan. 2. Normal wall motion and ejection fraction. 3. No evidence of significant fixed or reversible defect suggesting ischemia or myocardial infarction noted from this nuclear study. 4. Low risk scan. Visit Vitals /78 Pulse (!) 55 Ht 5' 9\" (1.753 m) Wt 82.6 kg (182 lb) BMI 26.88 kg/m² Mr. Dario Norman has a reminder for a \"due or due soon\" health maintenance. I have asked that he contact his primary care provider for follow-up on this health maintenance. Physical Exam  
Constitutional: He is oriented to person, place, and time. He appears well-developed and well-nourished. No distress. HENT:  
Head: Normocephalic and atraumatic. Eyes: Right eye exhibits no discharge. Left eye exhibits no discharge. No scleral icterus. Neck: Neck supple. No JVD present. Carotid bruit is not present. No thyromegaly present. Cardiovascular: Normal rate, regular rhythm, S1 normal, S2 normal, normal heart sounds and intact distal pulses. Exam reveals no gallop and no friction rub. No murmur heard. Pulmonary/Chest: Effort normal and breath sounds normal. He has no wheezes. He has no rales. Abdominal: Soft. He exhibits no mass. There is no tenderness. Musculoskeletal:     
   General: No edema. Neurological: He is alert and oriented to person, place, and time. Skin: Skin is warm and dry. No rash noted. Psychiatric: He has a normal mood and affect. His behavior is normal.  
 
 
ASSESSMENT and PLAN Results for Cate Gannon (MRN [id number]) as of 1/23/2020 08:00 Ref. Range 12/16/2019 07:52 Triglyceride Latest Ref Range: <150 MG/DL 43 Cholesterol, total Latest Ref Range: <200 MG/ HDL Cholesterol Latest Ref Range: 40 - 60 MG/DL 74 (H) CHOL/HDL Ratio Latest Ref Range: 0 - 5.0   2.1 VLDL, calculated Latest Units: MG/DL 8.6 LDL, calculated Latest Ref Range: 0 - 100 MG/DL 70.4 History of coronary artery stent placement: 3/12 prox LAD AJITH 
 
 Patient is doing very well and stable from cardiac standpoint. He has lost significant weight. He has an active lifestyle. Discussed the usual risk factors of cholesterol exercise and weight. Reduce metoprolol and monitor home BP chart. Diagnoses and all orders for this visit: 1. Essential hypertension -     AMB POC EKG ROUTINE W/ 12 LEADS, INTER & REP 2. Coronary artery disease involving native coronary artery of native heart without angina pectoris 3. History of coronary artery stent placement 4. Pure hypercholesterolemia Pertinent laboratory and test data reviewed and discussed with patient. See patient instructions also for other medical advice given There are no discontinued medications.

## 2020-01-23 NOTE — PATIENT INSTRUCTIONS
Medications Discontinued During This Encounter Medication Reason  nitroglycerin (NITROSTAT) 0.4 mg SL tablet Reorder After the recommended changes have been made in blood pressure medicines, patient advised to keep BP/HR(pulse rate) chart twice daily and bring us results in next 2 weeks or so. Patient may send the results via \"My Chart\" if desired. Please rest for 5-10 minutes before checking blood pressure A Healthy Heart: Care Instructions Your Care Instructions Heart disease occurs when a substance called plaque builds up in the vessels that supply oxygen-rich blood to your heart. This can narrow the blood vessels and reduce blood flow. A heart attack happens when blood flow is completely blocked. A high-fat diet, smoking, and other factors increase the risk of heart disease. Your doctor has found that you have a chance of having heart disease. You can do lots of things to keep your heart healthy. It may not be easy, but you can change your diet, exercise more, and quit smoking. These steps really work to lower your chance of heart disease. Follow-up care is a key part of your treatment and safety. Be sure to make and go to all appointments, and call your doctor if you are having problems. It's also a good idea to know your test results and keep a list of the medicines you take. How can you care for yourself at home? Diet 
  · Use less salt when you cook and eat. This helps lower your blood pressure. Taste food before salting. Add only a little salt when you think you need it. With time, your taste buds will adjust to less salt.  
  · Eat fewer snack items, fast foods, canned soups, and other high-salt, high-fat, processed foods.  
  · Read food labels and try to avoid saturated and trans fats.  They increase your risk of heart disease by raising cholesterol levels.  
  · Limit the amount of solid fat-butter, margarine, and shortening-you eat. Use olive, peanut, or canola oil when you cook. Bake, broil, and steam foods instead of frying them.  
  · Eating fish can lower your risk for heart disease. Eat at least 2 servings of fish a week. [place], mackerel, herring, sardines, and chunk light tuna are very good choices. These fish contain omega-3 fatty acids.  
  · Eat a variety of fruit and vegetables every day. Dark green, deep orange, red, or yellow fruits and vegetables are especially good for you. Examples include spinach, carrots, peaches, and berries.  
  · Foods high in fiber can reduce your cholesterol and provide important vitamins and minerals. High-fiber foods include whole-grain cereals and breads, oatmeal, beans, brown rice, citrus fruits, and apples.  
  · Limit drinks and foods with added sugar. These include candy, desserts, and soda pop.  
 Lifestyle changes 
  · If your doctor recommends it, get more exercise. Walking is a good choice. Bit by bit, increase the amount you walk every day. Try for at least 30 minutes on most days of the week. You also may want to swim, bike, or do other activities.  
  · Do not smoke. If you need help quitting, talk to your doctor about stop-smoking programs and medicines. These can increase your chances of quitting for good. Quitting smoking may be the most important step you can take to protect your heart. It is never too late to quit. You will get health benefits right away.  
  · Limit alcohol to 2 drinks a day for men and 1 drink a day for women. Too much alcohol can cause health problems. Medicines 
  · Take your medicines exactly as prescribed. Call your doctor if you think you are having a problem with your medicine.  
  · If your doctor recommends aspirin, take the amount directed each day. Make sure you take aspirin and not another kind of pain reliever, such as acetaminophen (Tylenol).  If you take ibuprofen (such as Advil or Motrin) for other problems, take aspirin at least 2 hours before taking ibuprofen. When should you call for help? Call 911 if you have symptoms of a heart attack. These may include: 
  · Chest pain or pressure, or a strange feeling in the chest.  
  · Sweating.  
  · Shortness of breath.  
  · Pain, pressure, or a strange feeling in the back, neck, jaw, or upper belly or in one or both shoulders or arms.  
  · Lightheadedness or sudden weakness.  
  · A fast or irregular heartbeat.  
 After you call 911, the  may tell you to chew 1 adult-strength or 2 to 4 low-dose aspirin. Wait for an ambulance. Do not try to drive yourself. 
 Watch closely for changes in your health, and be sure to contact your doctor if you have any problems. Where can you learn more? Go to http://tere-bonifacio.info/. Enter C887 in the search box to learn more about \"A Healthy Heart: Care Instructions. \" Current as of: April 9, 2019 Content Version: 12.2 © 0665-5707 [employer]. Care instructions adapted under license by [employer] (which disclaims liability or warranty for this information). If you have questions about a medical condition or this instruction, always ask your healthcare professional. [address] any warranty or liability for your use of this information. [employer] Activation Thank you for requesting access to [employer]. Please follow the instructions below to securely access and download your online medical record. [employer] allows you to send messages to your doctor, view your test results, renew your prescriptions, schedule appointments, and more. How Do I Sign Up? 1. In your internet browser, go to [email or web address]. 2. Click on the First Time User? Click Here link in the Sign In box. You will see the New Member Sign Up page. 3. Enter your [employer] Access Code exactly as it appears below.  You will not need to use this code after youve completed the sign-up process. If you do not sign up before the expiration date, you must request a new code. [employer] Access Code: [email or web address] Expires: 2/10/2020  7:54 AM (This is the date your [employer] access code will ) 4. Enter the last four digits of your Social Security Number (xxxx) and Date of Birth (mm/dd/yyyy) as indicated and click Submit. You will be taken to the next sign-up page. 5. Create a P[employer]share ID. This will be your [employer] login ID and cannot be changed, so think of one that is secure and easy to remember. 6. Create a [employer] password. You can change your password at any time. 7. Enter your Password Reset Question and Answer. This can be used at a later time if you forget your password. 8. Enter your e-mail address. You will receive e-mail notification when new information is available in [address]. 9. Click Sign Up. You can now view and download portions of your medical record. 10. Click the Download Summary menu link to download a portable copy of your medical information. Additional Information If you have questions, please visit the Frequently Asked Questions section of the [employer] website at [email or web address] Remember, [employer] is NOT to be used for urgent needs. For medical emergencies, dial 911.

## 2020-01-23 NOTE — PROGRESS NOTES
1. Have you been to the ER, urgent care clinic since your last visit? Hospitalized since your last visit?     no  2. Have you seen or consulted any other health care providers outside of the [address] [place] [address] since your last visit? Include any pap smears or colon screening. Yes, pcp  3. Since your last visit, have you had any of the following symptoms?        no cardiac symptoms       4. Have you had any blood work, X-rays or cardiac testing? Yes  At pcp office with physical              5.  Where do you normally have your labs drawn? PCP    6. Do you need any refills today?    no

## 2020-01-23 NOTE — PROGRESS NOTES
HISTORY OF PRESENT ILLNESS  Aurora Laguna is a 69 y.o. male. F/u CAD, old LAD PCI, HTN, HLD    Patient denies significant chest pain, SOB, palpitations, edema, dizziness      Cholesterol Problem   The history is provided by the medical records. This is a chronic problem. Pertinent negatives include no chest pain, no headaches and no shortness of breath. Hypertension   The history is provided by the medical records. This is a chronic problem. Pertinent negatives include no chest pain, no headaches and no shortness of breath. Review of Systems   Constitutional: Negative for chills, fever, malaise/fatigue and weight loss. HENT: Negative for nosebleeds. Eyes: Negative for discharge. Respiratory: Negative for cough, shortness of breath and wheezing. Cardiovascular: Negative for chest pain, palpitations, orthopnea, claudication, leg swelling and PND. Gastrointestinal: Negative for diarrhea, nausea and vomiting. Genitourinary: Negative for dysuria and hematuria. Musculoskeletal: Negative for joint pain. Skin: Negative for rash. Neurological: Negative for dizziness, seizures, loss of consciousness and headaches. Endo/Heme/Allergies: Negative for polydipsia. Does not bruise/bleed easily. Psychiatric/Behavioral: Negative for depression and substance abuse. The patient does not have insomnia.       No Known Allergies    Past Medical History:   Diagnosis Date    Basal cell carcinoma     right arm Dr Bev Martin CAD (coronary artery disease) 4/12    s/p stent LAD Dr. Caroline Ren Colon polyp     2007; Dr Nadia Chatman 5/17    Essential hypertension     Frozen shoulder syndrome 2007    Dr. Hicks Estimable History of coronary artery stent placement 10/2/2017    3/12 LAD PCI    Hyperlipidemia     Hypertrophy of prostate with urinary obstruction and other lower urinary tract symptoms (LUTS)     IBS (irritable bowel syndrome)     Impaired fasting glucose 3/12    Obesity     peak weight 217 lbs, bmi 32 from 12/14; IF 1/18 start weight 190+    Phymatous rosacea     Prostate CA (HCC)     T2C [place] 7 Dr Hoyle Krabbe 8/15    Squamous cell cancer of skin of nose 2016    s/p [facility] Dr Roger Montesinos Zoster 1980s       Family History   Problem Relation Age of Onset    Heart Disease Mother     Lung Disease Father     Dementia Father     Stroke Paternal Grandmother        Social History     Tobacco Use    Smoking status: Never Smoker    Smokeless tobacco: Never Used   Substance Use Topics    Alcohol use: Yes     Comment: greater than 15-20 12-ounce beers/week    Drug use: No        Current Outpatient Medications   Medication Sig    metoprolol tartrate (LOPRESSOR) 25 mg tablet TAKE 1 TABLET TWICE DAILY    atorvastatin (LIPITOR) 40 mg tablet TAKE 1 TABLET EVERY DAY    nitroglycerin (NITROSTAT) 0.4 mg SL tablet 1 Tab by SubLINGual route every five (5) minutes as needed for up to 3 doses.  Cholecalciferol, Vitamin D3, 1,000 unit cap Take  by mouth as needed.  vitamin a-vitamin c-vit e-min (OCUVITE) tablet Take 1 Tab by mouth daily.  aspirin 81 mg tablet Take 1 Tab by mouth daily. No current facility-administered medications for this visit. Past Surgical History:   Procedure Laterality Date    ABDOMEN SURGERY PROC UNLISTED  1988    inguinal hernia repair on left    HX CATARACT REMOVAL      Dr Mary Quesada 2019, LEFT 2020    HX COLONOSCOPY      Dr Tatiana Diaz 2007 polyp; Dr Tatiana Diaz 5/17 polyp    HX CORONARY STENT PLACEMENT  3/12    3/12 prox LAD AJITH    HX TONSILLECTOMY      HX UROLOGICAL  06/10    TURP    HX UROLOGICAL  05/10    TURP with biopsy 7/08, 5/10    HX UROLOGICAL  10/15    DaVinci prostatectomy Dr Taylor Prost in [address] [place] [address], NEEDLE, SATURATION SAMPLING      06/10, 5/10, 07/08, 8/15       Diagnostic Studies:  No flowsheet data found. 1/18 Nuc Stress  Conclusion:   1. Normal perfusion scan. 2. Normal wall motion and ejection fraction.    3. No evidence of significant fixed or reversible defect suggesting ischemia or myocardial infarction noted from this nuclear study. 4. Low risk scan. Visit Vitals  /78   Pulse (!) 55   Ht 5' 9\" (1.753 m)   Wt 82.6 kg (182 lb)   BMI 26.88 kg/m²       Mr. Karlene Mcnally has a reminder for a \"due or due soon\" health maintenance. I have asked that he contact his primary care provider for follow-up on this health maintenance. Physical Exam   Constitutional: He is oriented to person, place, and time. He appears well-developed and well-nourished. No distress. HENT:   Head: Normocephalic and atraumatic. Eyes: Right eye exhibits no discharge. Left eye exhibits no discharge. No scleral icterus. Neck: Neck supple. No JVD present. Carotid bruit is not present. No thyromegaly present. Cardiovascular: Normal rate, regular rhythm, S1 normal, S2 normal, normal heart sounds and intact distal pulses. Exam reveals no gallop and no friction rub. No murmur heard. Pulmonary/Chest: Effort normal and breath sounds normal. He has no wheezes. He has no rales. Abdominal: Soft. He exhibits no mass. There is no tenderness. Musculoskeletal:         General: No edema. Neurological: He is alert and oriented to person, place, and time. Skin: Skin is warm and dry. No rash noted. Psychiatric: He has a normal mood and affect. His behavior is normal.       ASSESSMENT and PLAN    HLD: Results for Nubia Horn (MRN [id number]) as of 1/23/2020 08:11   Ref. Range 12/16/2019 07:52   Triglyceride Latest Ref Range: <150 MG/DL 43   Cholesterol, total Latest Ref Range: <200 MG/   HDL Cholesterol Latest Ref Range: 40 - 60 MG/DL 74 (H)   CHOL/HDL Ratio Latest Ref Range: 0 - 5.0   2.1   VLDL, calculated Latest Units: MG/DL 8.6   LDL, calculated Latest Ref Range: 0 - 100 MG/DL 70.4     History of coronary artery stent placement: 3/12 prox LAD AJITH    Patient is doing very well and stable from cardiac standpoint.   He has lost significant weight. He has an active lifestyle. Discussed the usual risk factors of cholesterol exercise and weight. Blood pressure mildly elevated but per patient resting pressures at home are in 120s. Check home chart and then make some adjustments. If needed, would likely add low-dose Norvasc. Diagnoses and all orders for this visit:    1. Coronary artery disease involving native coronary artery of native heart without angina pectoris  -     nitroglycerin (NITROSTAT) 0.4 mg SL tablet; 1 Tab by SubLINGual route every five (5) minutes as needed for Chest Pain for up to 3 doses. 2. History of coronary artery stent placement    3. Essential hypertension  -     AMB POC EKG ROUTINE W/ 12 LEADS, INTER & REP    4. Pure hypercholesterolemia        Pertinent laboratory and test data reviewed and discussed with patient. See patient instructions also for other medical advice given    Medications Discontinued During This Encounter   Medication Reason    nitroglycerin (NITROSTAT) 0.4 mg SL tablet Reorder       Follow-up and Dispositions    · Return in about 1 year (around 1/23/2021), or if symptoms worsen or fail to improve, for with ekg.

## 2020-02-14 DIAGNOSIS — I25.10 CORONARY ARTERY DISEASE INVOLVING NATIVE CORONARY ARTERY OF NATIVE HEART WITHOUT ANGINA PECTORIS: ICD-10-CM

## 2020-02-17 RX ORDER — NITROGLYCERIN 0.4 MG/1
TABLET SUBLINGUAL
Qty: 25 TAB | Refills: 0 | Status: SHIPPED | OUTPATIENT
Start: 2020-02-17 | End: 2022-01-24

## 2020-04-20 ENCOUNTER — OFFICE VISIT (OUTPATIENT)
Dept: FAMILY MEDICINE CLINIC | Facility: CLINIC | Age: 75
End: 2020-04-20

## 2020-04-20 ENCOUNTER — TELEPHONE (OUTPATIENT)
Dept: INTERNAL MEDICINE CLINIC | Age: 75
End: 2020-04-20

## 2020-04-20 VITALS
RESPIRATION RATE: 16 BRPM | OXYGEN SATURATION: 97 % | TEMPERATURE: 97.2 F | SYSTOLIC BLOOD PRESSURE: 158 MMHG | HEART RATE: 64 BPM | DIASTOLIC BLOOD PRESSURE: 88 MMHG

## 2020-04-20 DIAGNOSIS — J02.0 STREP PHARYNGITIS: ICD-10-CM

## 2020-04-20 DIAGNOSIS — R05.9 COUGH: Primary | ICD-10-CM

## 2020-04-20 LAB
FLUAV+FLUBV AG NOSE QL IA.RAPID: NEGATIVE POS/NEG
FLUAV+FLUBV AG NOSE QL IA.RAPID: NEGATIVE POS/NEG
S PYO AG THROAT QL: POSITIVE
VALID INTERNAL CONTROL?: YES
VALID INTERNAL CONTROL?: YES

## 2020-04-20 RX ORDER — AMOXICILLIN 500 MG/1
500 CAPSULE ORAL 2 TIMES DAILY
Qty: 20 CAP | Refills: 0 | Status: SHIPPED | OUTPATIENT
Start: 2020-04-20 | End: 2020-04-30

## 2020-04-20 NOTE — TELEPHONE ENCOUNTER
I scheduled patient for today 4/20/20.  Time 10:20am.       [address] [facility] [address]  [facility]

## 2020-04-20 NOTE — TELEPHONE ENCOUNTER
How many days sick: 3-4 weeks  Who is the exposure from: He had it a couple months ago. Took forever to get rid of it. His wife caught it from him and now he has it again. Is there fever and how high (100.4 in 67yo, 101.4 in younger) No  Are there upper resp (sinus/ear/throat) sx  No  Are there lower resp (chest congestion/cough/sputum/wheezing/sob) sx-  Deep productive cough. Unsure of color of sputum. States he swallows it. Any other flu like sx (aches/n/v/diarrhea) No  What's been tried otc or inhalers-  No  H/o copd/asthma/lung disease/dm/renal fauilure:   No

## 2020-04-20 NOTE — PROGRESS NOTES
Chief Complaint   Patient presents with    Cough     x 3-4 weeks     Cold Symptoms     running nose (clear)       SUBJECTIVE:    Respiratory Symptoms:      The patient presents to our specialty flu / COVID-19 clinic with a focused complaint of the following:  post nasal drip and runny nose with clear discharge. Cough with phlegm but color of phlegm unknown. Pt has hx of seasonal allergies-not taking OTC meds. The patient denies GI symptoms. The patient denies  symptoms. The patient denies chest pain, denies chest tightness, denies shortness of breath. Duration of symptoms has been 2-3 weeks   Patient denies OTC treatment:     Patient denies recent travel. Pt exposed to sick contacts under investigations for possible Covid NO. Pt is retired. Pt is not a current smoker. Received influenza vaccine: YES    OBJECTIVE    Visit Vitals  /88 (BP 1 Location: Left arm, BP Patient Position: Sitting)   Pulse 64   Temp 97.2 °F (36.2 °C) (Oral)   Resp 16   SpO2 97%      General:  alert, well nourished, cooperative, pleasant and in no apparent distress. Sick but not toxic appearing. Eyes:   The lids are without swelling, lesions, or drainage. The conjunctiva is clear and noninjected. ENT:  bilateral TM normal without fluid or infection, neck without nodes, pharynx erythematous without exudate and airway not compromised. tonsils are present and normal.  Neck: normal.  Lungs/CV: clear to auscultation, no wheezes or rales and unlabored breathing. Heart: regular rhythm normal rate. Skin:  No rashes, no jaundice. ASSESSMENT / PLAN     ICD-10-CM ICD-9-CM    1. Cough R05 786.2 AMB POC RAPID INFLUENZA TEST      AMB POC RAPID STREP A   2. Strep pharyngitis J02.0 034.0 amoxicillin (AMOXIL) 500 mg capsule     Instructed pt to change toothbrush after 24 hours of taking ABX therapy to help prevent reinfection of strep. Patient verbalized understanding.       Patient has tested negative for influenza  Pt has tested Positive for strep. low suspicion of Covid at this time due to symptoms and possible exposure. Based on CDC recommendations and limited testing supplies, only those patients who meet criteria will be tested for Covid. High priority groups for testing   Symptomatic and/or Exposure /Test for Covid  Immunocompromised host (on prednisone, biological therapy, blood cancer, metastatic cancer or active chemotherapy)   [facility] care worker in the home    Other high-risk group: o age >47   o Uncontrolled DM   o Uncontrolled HTN   o BMI >40, CKD/ESRD    Dialysis patients (patients going to HD units, not asymptomatic home HD/PD)    Anyone living in a congregate setting     Non High-risk patient category           Test for COVID-19   Asymptomatic, no known exposure  No    Asymptomatic, possible exposure  No    Asymptomatic, definite exposure  Provider discretion    Symptomatic, no known exposure  Yes    Symptomatic, + exposure  Yes        Based on this patients symptoms and the current pandemic situation, I have not recommended a 14 day quarantine from the onset of symptoms. COVID-19 testing was not completed. does not  meet criteria for Covid testing. If pt spikes a fever or other symptoms worsen, pt to schedule to be seen here at the [facility] clinic. We have provided the patient with a detailed after visit summary which was reviewed, and red flag symptoms that would warrant an ER visit were emphasized. CC'd chart to PCP: Yes: Comment: Miriam Watkins [facility], AGNP-C, DNP      This visit was provided as a focused evaluation during the COVID -19 pandemic/national emergency. A comprehensive review of all previous patient history and testing was not conducted. Pertinent findings were elicited during the visit.

## 2020-04-20 NOTE — PROGRESS NOTES
David Mendoza presents today for   Chief Complaint   Patient presents with    Cough     x 3-4 weeks     Cold Symptoms     running nose (clear)       Is someone accompanying this pt? No    Is the patient using any DME equipment during OV? No    Travel and Exposure Screening was performed during check in or rooming process Yes  No travel      Depression Screening:  3 most recent PHQ Screens 4/20/2020   Little interest or pleasure in doing things Not at all   Feeling down, depressed, irritable, or hopeless Not at all   Total Score PHQ 2 0       Fall Risk  Fall Risk Assessment, last 12 mths 4/20/2020   Able to walk? Yes   Fall in past 12 months? No       This Visit Test  Results for orders placed or performed during the hospital encounter of 12/16/19   CBC W/O DIFF   Result Value Ref Range    WBC 5.6 4.6 - 13.2 K/uL    RBC 4.58 (L) 4.70 - 5.50 M/uL    HGB 14.3 13.0 - 16.0 g/dL    HCT 44.8 36.0 - 48.0 %    MCV 97.8 (H) 74.0 - 97.0 FL    MCH 31.2 24.0 - 34.0 PG    MCHC 31.9 31.0 - 37.0 g/dL    RDW 14.0 11.6 - 14.5 %    PLATELET 440 298 - 051 K/uL    MPV 10.8 9.2 - 96.5 FL   METABOLIC PANEL, COMPREHENSIVE   Result Value Ref Range    Sodium 141 136 - 145 mmol/L    Potassium 5.1 3.5 - 5.5 mmol/L    Chloride 107 100 - 111 mmol/L    CO2 31 21 - 32 mmol/L    Anion gap 3 3.0 - 18 mmol/L    Glucose 112 (H) 74 - 99 mg/dL    BUN 19 (H) 7.0 - 18 MG/DL    Creatinine 0.84 0.6 - 1.3 MG/DL    BUN/Creatinine ratio 23 (H) 12 - 20      GFR est AA >60 >60 ml/min/1.73m2    GFR est non-AA >60 >60 ml/min/1.73m2    Calcium 8.8 8.5 - 10.1 MG/DL    Bilirubin, total 0.8 0.2 - 1.0 MG/DL    ALT (SGPT) 45 16 - 61 U/L    AST (SGOT) 32 10 - 38 U/L    Alk.  phosphatase 84 45 - 117 U/L    Protein, total 6.6 6.4 - 8.2 g/dL    Albumin 3.9 3.4 - 5.0 g/dL    Globulin 2.7 2.0 - 4.0 g/dL    A-G Ratio 1.4 0.8 - 1.7     LIPID PANEL   Result Value Ref Range    LIPID PROFILE          Cholesterol, total 153 <200 MG/DL    Triglyceride 43 <150 MG/DL    HDL Cholesterol 74 (H) 40 - 60 MG/DL    LDL, calculated 70.4 0 - 100 MG/DL    VLDL, calculated 8.6 MG/DL    CHOL/HDL Ratio 2.1 0 - 5.0

## 2020-05-08 RX ORDER — ATORVASTATIN CALCIUM 40 MG/1
TABLET, FILM COATED ORAL
Qty: 90 TAB | Refills: 3 | Status: SHIPPED | OUTPATIENT
Start: 2020-05-08 | End: 2021-02-22

## 2020-05-25 DIAGNOSIS — I25.10 CORONARY ARTERY DISEASE INVOLVING NATIVE CORONARY ARTERY OF NATIVE HEART WITHOUT ANGINA PECTORIS: ICD-10-CM

## 2020-05-26 RX ORDER — METOPROLOL TARTRATE 25 MG/1
TABLET, FILM COATED ORAL
Qty: 180 TAB | Refills: 1 | Status: SHIPPED | OUTPATIENT
Start: 2020-05-26 | End: 2020-10-26 | Stop reason: SDUPTHER

## 2020-06-22 ENCOUNTER — TELEPHONE (OUTPATIENT)
Dept: INTERNAL MEDICINE CLINIC | Age: 75
End: 2020-06-22

## 2020-06-22 NOTE — TELEPHONE ENCOUNTER
Pt is c/o feeling tired all the time, also has ongoing cough that was been going on 3-4 months, \"bronchial type\" he said, head and ears clogged up last 3040 weeks. No temp. Starting taking antihistamine CEPIRIZINE 1 1/2 weeks ago per pharmacist recommendation but he isn't feeling relief. Wants to know what RD advises or if RD can see him? ? Please advise him at [phone].

## 2020-06-24 ENCOUNTER — OFFICE VISIT (OUTPATIENT)
Dept: INTERNAL MEDICINE CLINIC | Age: 75
End: 2020-06-24

## 2020-06-24 ENCOUNTER — HOSPITAL ENCOUNTER (OUTPATIENT)
Dept: GENERAL RADIOLOGY | Age: 75
Discharge: HOME | End: 2020-06-24
Payer: MEDICARE

## 2020-06-24 VITALS
OXYGEN SATURATION: 97 % | TEMPERATURE: 98.1 F | BODY MASS INDEX: 27.11 KG/M2 | HEART RATE: 69 BPM | SYSTOLIC BLOOD PRESSURE: 155 MMHG | HEIGHT: 69 IN | DIASTOLIC BLOOD PRESSURE: 76 MMHG | RESPIRATION RATE: 14 BRPM | WEIGHT: 183 LBS

## 2020-06-24 DIAGNOSIS — J30.89 NON-SEASONAL ALLERGIC RHINITIS, UNSPECIFIED TRIGGER: ICD-10-CM

## 2020-06-24 DIAGNOSIS — R05.3 CHRONIC COUGH: ICD-10-CM

## 2020-06-24 DIAGNOSIS — R05.3 CHRONIC COUGH: Primary | ICD-10-CM

## 2020-06-24 PROCEDURE — 71046 X-RAY EXAM CHEST 2 VIEWS: CPT

## 2020-06-24 RX ORDER — PREDNISONE 10 MG/1
TABLET ORAL
Qty: 21 TAB | Refills: 0 | Status: SHIPPED | OUTPATIENT
Start: 2020-06-24 | End: 2020-12-28 | Stop reason: ALTCHOICE

## 2020-06-24 NOTE — PROGRESS NOTES
69 y.o. WHITE OR  male who presents for evaluation. He apparently has had allergy symptoms \"for years\". This is pretty much year-round, he has not noticed the difference whenever he goes out of town for a week. Over the last 3 to 4 months, he has been having a lot of postnasal drainage and cough. No fevers, facial pain, purulent drainage, he does have fullness in the bilateral ears but no hearing loss, ringing, dizziness, just more of a muffled sensation. He has had cough for about 4 months now, intermittently productive of clear sputum for the most part. He hears rhonchi but no overt wheezing, not really short of breath. He has not had night sweats, hemoptysis, pleurisy, weight loss or other constitutional complaints. He uses cetirizine as needed, tried Flonase sporadically in the past but never on a consistent basis and never together. He was seen at [facility] on 4/20/2020 and was diagnosed with strep pharyngitis, was given amoxicillin. That has not really made much of a difference with the coughing.   Reports no GI distress, specifically GERD symptoms    Past Medical History:   Diagnosis Date    Basal cell carcinoma     Dr Azeem Jo CAD (coronary artery disease) 4/12    s/p stent LAD Dr. Shaun Castañeda Colon polyp     2007; Dr Rere Joe 5/17    Frozen shoulder syndrome 2007    Dr. Irma Canela Hyperlipidemia     IBS (irritable bowel syndrome)     Impaired fasting glucose 3/12    Lower urinary tract symptoms (LUTS)     Obesity     peak weight 217 lbs, bmi 32 from 12/14; IF 1/18 start weight 190+    Phymatous rosacea     Primary hypertension     Prostate CA ([address]     T2C [place] 7 Dr Joe Ya 8/15; DVP Dr Villanueva Client 10/15    Squamous cell cancer of skin of nose 2016    Dr Milana Wilson; s/p Moh's    Zoster 1980s     Past Surgical History:   Procedure Laterality Date    HX CATARACT REMOVAL      Dr Miguel Angel Sherman RIGHT 2019, LEFT 2020    HX COLONOSCOPY      Dr Rere Joe 2007 polyp; Dr Rere Joe 5/17 polyp    HX CORONARY STENT PLACEMENT  3/12    3/12 prox LAD AJITH    HX HERNIA REPAIR  1988    LEFT IH    HX TONSILLECTOMY      HX UROLOGICAL      TURP with biopsy 7/08, 5/10    HX UROLOGICAL  10/15    DaVinci prostatectomy Dr Mena Guevara in [address] [place] [address], NEEDLE, SATURATION SAMPLING      7/08, 6/10, 5/10, 8/15     Social History     Socioeconomic History    Marital status:      Spouse name: Not on file    Number of children: 3    Years of education: Not on file    Highest education level: Not on file   Occupational History    Occupation: engr [employer]   Social Needs    Financial resource strain: Not on file    Food insecurity     Worry: Not on file     Inability: Not on file   [employer] needs     Medical: Not on file     Non-medical: Not on file   Tobacco Use    Smoking status: Never Smoker    Smokeless tobacco: Never Used   Substance and Sexual Activity    Alcohol use: Yes     Comment: greater than 15-20 12-ounce beers/week    Drug use: No    Sexual activity: Not on file   Lifestyle    Physical activity     Days per week: Not on file     Minutes per session: Not on file    Stress: Not on file   Relationships    Social connections     Talks on phone: Not on file     Gets together: Not on file     Attends [employer] service: Not on file     Active member of club or organization: Not on file     Attends meetings of clubs or organizations: Not on file     Relationship status: Not on file    Intimate partner violence     Fear of current or ex partner: Not on file     Emotionally abused: Not on file     Physically abused: Not on file     Forced sexual activity: Not on file   Other Topics Concern    Not on file   Social History Narrative    Not on file     Current Outpatient Medications   Medication Sig    predniSONE (STERAPRED DS) 10 mg dose pack See administration instruction per 10mg dose pack    metoprolol tartrate (LOPRESSOR) 25 mg tablet TAKE 1 TABLET TWICE DAILY    atorvastatin (LIPITOR) 40 mg tablet TAKE 1 TABLET EVERY DAY    nitroglycerin (NITROSTAT) 0.4 mg SL tablet DISSOLVE 1 TAB UNDER THE TONGUE EVERY 5 MINUTES AS NEEDED FOR CHEST PAIN FOR UP TO 3 DOSES AS DIRECTED    Cholecalciferol, Vitamin D3, 1,000 unit cap Take  by mouth as needed.  vitamin a-vitamin c-vit e-min (OCUVITE) tablet Take 1 Tab by mouth daily.  aspirin 81 mg tablet Take 1 Tab by mouth daily. No current facility-administered medications for this visit. No Known Allergies    REVIEW OF SYSTEMS:   Ophtho  no vision change or eye pain  Oral  no mouth pain, tongue or tooth problems  Ears  no hearing loss, ear pain, fullness, no swallowing problems  Cardiac  no CP, PND, orthopnea, edema, palpitations or syncope  Chest  no breast masses  GI  no heartburn, nausea, vomiting, change in bowel habits, bleeding, hemorrhoids  Urinary  no dysuria, hematuria, flank pain, urgency, frequency    Visit Vitals  /76   Pulse 69   Temp 98.1 °F (36.7 °C) (Temporal)   Resp 14   Ht 5' 9\" (1.753 m)   Wt 183 lb (83 kg)   SpO2 97%   BMI 27.02 kg/m²   Sinuses nontender with mildly boggy mucosa, oropharynx was benign, no adenopathy. TMs normal.  Lungs are clear with good breath sounds, very rare rhonchi heard. Heart showed regular rate rhythm. No murmurs gallops. Abdomen soft and nontender. Extremities no edema    Assessment and plan:  1. Probable perennial allergic rhinitis. Very long discussion regarding that. We will give him a quick steroid taper, he will start back the Flonase and cetirizine but use consistently. If no improvement in the next 3 to 4 weeks, will send to allergy/ENT. 2.  Chronic cough. Probably from above. Chest x-ray to be done, call with an update, pulmonary if no better      Above conditions discussed at length and patient vocalized understanding.   All questions answered to patient satisfaction

## 2020-06-26 ENCOUNTER — TELEPHONE (OUTPATIENT)
Dept: INTERNAL MEDICINE CLINIC | Age: 75
End: 2020-06-26

## 2020-06-26 NOTE — TELEPHONE ENCOUNTER
pls call    IMPRESSION   IMPRESSION:     1. No acute cardiopulmonary process. No change.     Continue plans as outlined and call w update

## 2020-06-26 NOTE — TELEPHONE ENCOUNTER
Pt wife called asking if DR has the results from his chest xray he had on Wednesday - pt is a little anxious and asking if someone could call back today

## 2020-06-30 ENCOUNTER — TELEPHONE (OUTPATIENT)
Dept: INTERNAL MEDICINE CLINIC | Age: 75
End: 2020-06-30

## 2020-06-30 NOTE — TELEPHONE ENCOUNTER
Misa Marti suggest ENT/allergy eval  sched [facility] if amenable

## 2020-06-30 NOTE — TELEPHONE ENCOUNTER
Was told to call back and give an update on how he's doing. His cough is greatly improved but still there. His sinuses are better but still running. His ears are still stopped up even worse. No fever. Please advise.

## 2020-10-26 DIAGNOSIS — I25.10 CORONARY ARTERY DISEASE INVOLVING NATIVE CORONARY ARTERY OF NATIVE HEART WITHOUT ANGINA PECTORIS: ICD-10-CM

## 2020-10-26 RX ORDER — METOPROLOL TARTRATE 25 MG/1
25 TABLET, FILM COATED ORAL 2 TIMES DAILY
Qty: 180 TAB | Refills: 2 | Status: SHIPPED | OUTPATIENT
Start: 2020-10-26 | End: 2021-05-13

## 2020-10-28 ENCOUNTER — OFFICE VISIT (OUTPATIENT)
Dept: INTERNAL MEDICINE CLINIC | Age: 75
End: 2020-10-28
Payer: MEDICARE

## 2020-10-28 DIAGNOSIS — Z23 NEEDS FLU SHOT: Primary | ICD-10-CM

## 2020-10-28 PROCEDURE — 90694 VACC AIIV4 NO PRSRV 0.5ML IM: CPT | Performed by: INTERNAL MEDICINE

## 2020-12-21 ENCOUNTER — HOSPITAL ENCOUNTER (OUTPATIENT)
Dept: LAB | Age: 75
Discharge: HOME | End: 2020-12-21
Payer: MEDICARE

## 2020-12-21 ENCOUNTER — APPOINTMENT (OUTPATIENT)
Dept: INTERNAL MEDICINE CLINIC | Age: 75
End: 2020-12-21

## 2020-12-21 DIAGNOSIS — E78.00 PURE HYPERCHOLESTEROLEMIA: ICD-10-CM

## 2020-12-21 LAB
ALBUMIN SERPL-MCNC: 4 G/DL (ref 3.4–5)
ALBUMIN/GLOB SERPL: 1.4 {RATIO} (ref 0.8–1.7)
ALP SERPL-CCNC: 87 U/L (ref 45–117)
ALT SERPL-CCNC: 28 U/L (ref 16–61)
ANION GAP SERPL CALC-SCNC: 3 MMOL/L (ref 3–18)
AST SERPL-CCNC: 23 U/L (ref 10–38)
BILIRUB SERPL-MCNC: 0.4 MG/DL (ref 0.2–1)
BUN SERPL-MCNC: 13 MG/DL (ref 7–18)
BUN/CREAT SERPL: 14 (ref 12–20)
CALCIUM SERPL-MCNC: 8.8 MG/DL (ref 8.5–10.1)
CHLORIDE SERPL-SCNC: 111 MMOL/L (ref 100–111)
CHOLEST SERPL-MCNC: 157 MG/DL
CO2 SERPL-SCNC: 30 MMOL/L (ref 21–32)
CREAT SERPL-MCNC: 0.93 MG/DL (ref 0.6–1.3)
ERYTHROCYTE [DISTWIDTH] IN BLOOD BY AUTOMATED COUNT: 14 % (ref 11.6–14.5)
GLOBULIN SER CALC-MCNC: 2.8 G/DL (ref 2–4)
GLUCOSE SERPL-MCNC: 113 MG/DL (ref 74–99)
HCT VFR BLD AUTO: 43.7 % (ref 36–48)
HDLC SERPL-MCNC: 81 MG/DL (ref 40–60)
HDLC SERPL: 1.9 {RATIO} (ref 0–5)
HGB BLD-MCNC: 13.9 G/DL (ref 13–16)
LDLC SERPL CALC-MCNC: 65.4 MG/DL (ref 0–100)
LIPID PROFILE,FLP: ABNORMAL
MCH RBC QN AUTO: 31.6 PG (ref 24–34)
MCHC RBC AUTO-ENTMCNC: 31.8 G/DL (ref 31–37)
MCV RBC AUTO: 99.3 FL (ref 74–97)
PLATELET # BLD AUTO: 205 K/UL (ref 135–420)
PMV BLD AUTO: 11 FL (ref 9.2–11.8)
POTASSIUM SERPL-SCNC: 4.6 MMOL/L (ref 3.5–5.5)
PROT SERPL-MCNC: 6.8 G/DL (ref 6.4–8.2)
RBC # BLD AUTO: 4.4 M/UL (ref 4.7–5.5)
SODIUM SERPL-SCNC: 144 MMOL/L (ref 136–145)
TRIGL SERPL-MCNC: 53 MG/DL (ref ?–150)
VLDLC SERPL CALC-MCNC: 10.6 MG/DL
WBC # BLD AUTO: 5.5 K/UL (ref 4.6–13.2)

## 2020-12-21 PROCEDURE — 80053 COMPREHEN METABOLIC PANEL: CPT

## 2020-12-21 PROCEDURE — 36415 COLL VENOUS BLD VENIPUNCTURE: CPT

## 2020-12-21 PROCEDURE — 80061 LIPID PANEL: CPT

## 2020-12-21 PROCEDURE — 85027 COMPLETE CBC AUTOMATED: CPT

## 2020-12-24 NOTE — PROGRESS NOTES
This is a subsequent Medicare Annual Wellness Exam     I have reviewed the patient's medical history in detail and updated the computerized patient record. History     Past Medical History:   Diagnosis Date    Basal cell carcinoma     Dr Terese Brewster CAD (coronary artery disease) 4/12    s/p stent LAD Dr. René Freedman Colon polyp     2007; Dr Rashi West 5/17    Erectile dysfunction after radical prostatectomy 12/28/2020    Frozen shoulder syndrome 2007    Dr. Frantz Abreu    Hyperlipidemia     IBS (irritable bowel syndrome)     Impaired fasting glucose 3/12    Lower urinary tract symptoms (LUTS)     Obesity     peak weight 217 lbs, bmi 32 from 12/14; IF 1/18 start weight 190+    Phymatous rosacea     Primary hypertension     Prostate cancer ([facility] [address]     T2C [place] 7 Dr Fern Ceballos 8/15; DVP Dr Emily Birch 10/15    Squamous cell cancer of skin of nose 2016    Dr Cynthia Rinaldi; s/p Moh's    Zoster 1980s      Past Surgical History:   Procedure Laterality Date    HX CATARACT REMOVAL Bilateral 2019    Dr Haydee West 2007 polyp; Dr Rashi West 5/17 polyp    HX CORONARY STENT PLACEMENT  3/12    3/12 prox LAD AJITH     [facility]    LEFT IH    HX TONSILLECTOMY      HX UROLOGICAL  10/15    DaVinci prostatectomy Dr Elijah Najjar in [address] [place] [address], NEEDLE, SATURATION SAMPLING      7/08, 6/10, 5/10, 8/15     Current Outpatient Medications   Medication Sig Dispense Refill    sildenafil citrate (VIAGRA) 100 mg tablet Take 1 Tab by mouth as needed for Erectile Dysfunction. 10 Tab 11    metoprolol tartrate (LOPRESSOR) 25 mg tablet Take 1 Tab by mouth two (2) times a day. 180 Tab 2    atorvastatin (LIPITOR) 40 mg tablet TAKE 1 TABLET EVERY DAY 90 Tab 3    nitroglycerin (NITROSTAT) 0.4 mg SL tablet DISSOLVE 1 TAB UNDER THE TONGUE EVERY 5 MINUTES AS NEEDED FOR CHEST PAIN FOR UP TO 3 DOSES AS DIRECTED 25 Tab 0    Cholecalciferol, Vitamin D3, 1,000 unit cap Take  by mouth as needed.       aspirin 81 mg tablet Take 1 Tab by mouth daily. 90 Tab 3    vitamin a-vitamin c-vit e-min (OCUVITE) tablet Take 1 Tab by mouth daily. No Known Allergies     Family History   Problem Relation Age of Onset    Heart Disease Mother     Lung Disease Father     Dementia Father     Stroke Paternal Grandmother      Social History     Tobacco Use    Smoking status: Never Smoker    Smokeless tobacco: Never Used   Substance Use Topics    Alcohol use: Yes     Comment: greater than 15-20 12-ounce beers/week     Depression Risk Factor Screening:     3 most recent PHQ Screens 12/28/2020   Little interest or pleasure in doing things Not at all   Feeling down, depressed, irritable, or hopeless Not at all   Total Score PHQ 2 0     SCREENINGS  Colonoscopy last done 5/17 Dr Oletha Mortimer    Immunization History   Administered Date(s) Administered    (RETIRED) Pneumococcal Vaccine (Unspecified Type) 01/01/2009    Influenza High Dose Vaccine PF 12/06/2016, 10/16/2017    Influenza Vaccine (Tri) Adjuvanted (>65 Yrs FLUAD TRI [id number]) 11/16/2018, 10/28/2019    Influenza Vaccine PF 10/31/2014    Influenza Vaccine Whole 01/01/2010    Influenza, Quadrivalent, Adjuvanted (>65 Yrs FLUAD QUAD [id number]) 10/28/2020    Pneumococcal Conjugate (PCV-13) 06/09/2014    Pneumococcal Polysaccharide (PPSV-23) 10/31/2014    TD Vaccine 06/23/2009    Tdap 12/30/2019    Zoster Recombinant 12/30/2019, 06/15/2020    Zoster Vaccine, Live 01/01/2011     Alcohol Risk Factor Screening: On any occasion during the past 3 months, have you had more than 4 drinks containing alcohol? No    Do you average more than 14 drinks per week? No    Functional Ability and Level of Safety:     Hearing Loss   normal-to-mild    Vision - no acute complaints and is followed by Dr Partha Al [facility]   Self-care. Requires assistance with: no ADLs    Fall Risk     Fall Risk Assessment, last 12 mths 12/28/2020   Able to walk?  Yes   Fall in past 15 months? 0   Do you feel unsteady? 0   Are you worried about falling 0     Abuse Screen   Patient is not abused    Review of Systems   A comprehensive review of systems was negative except for that written in the HPI. Physical Examination     Visit Vitals  /66   Pulse 66   Temp 97.2 °F (36.2 °C) (Temporal)   Resp 14   Ht 5' 9\" (1.753 m)   Wt 186 lb (84.4 kg)   SpO2 98%   BMI 27.47 kg/m²     Evaluation of Cognitive Function:  Mood/affect:  happy  Appearance: age appropriate, overweight, within normal Limits and younger than stated age  Family member/caregiver input: na    Patient Care Team:  Devorah Torres MD as PCP - General (Internal Medicine)  Devorah Torres MD as PCP - [facility]  Jessica Morrissey MD as Physician (Urology)  Olamide Llanos MD (Urology)  West Barajas MD as Physician (Urology)    Advice/Referrals/Counseling   Education and counseling provided:  Are appropriate based on today's review and evaluation  End-of-Life planning (with patient's consent)  Pneumococcal Vaccine  Influenza Vaccine  Prostate cancer screening tests (PSA, covered annually)  Colorectal cancer screening tests  Cardiovascular screening blood test  Diabetes screening test    Assessment/Plan       ICD-10-CM ICD-9-CM    1. Medicare annual wellness visit, subsequent  Z00.00 V70.0    2. Erectile dysfunction after radical prostatectomy  N52.31 607.84 sildenafil citrate (VIAGRA) 100 mg tablet      REFERRAL TO UROLOGY   3. Coronary artery disease involving native coronary artery of native heart without angina pectoris  I25.10 414.01    4. Essential hypertension  I10 401.9    5. History of prostate cancer  Z85.46 V10.46 REFERRAL TO UROLOGY      PSA, DIAGNOSTIC (PROSTATE SPECIFIC AG)   6. Pure hypercholesterolemia  E78.00 272.0 CBC W/O DIFF      LIPID PANEL   7. Impaired fasting glucose  T98.70 766.32 METABOLIC PANEL, COMPREHENSIVE      HEMOGLOBIN A1C W/O EAG   8. Overweight (BMI 25.0-29. 9)  E66.3 278.02 9. Advanced directives, counseling/discussion  Z71.89 V65.49    10. Screening for diabetes mellitus  Z13.1 V77.1      current treatment plan is effective, no change in therapy  lab results and schedule of future lab studies reviewed with patient  reviewed diet, exercise and weight control  cardiovascular risk and specific lipid/LDL goals reviewed. End of life discussion undertaken.   He will work on getting medical directive in place  Colonoscopy to be scheduled 2027

## 2020-12-24 NOTE — PROGRESS NOTES
77 y.o. WHITE OR  male who presents for f/u    Denies any cardiovascular complaints and he sees Dr. Dhara Hull yearly now. Walks, plays golf and table tennis, lawn work and no problems to report. He has not used the nitro in years    Denies polyuria, polydipsia, nocturia, vision change. Not checking sugars at this time. He gained some weight witht he pandemic and holidays but already working on getting it off    [address] 12/28/2020 6/24/2020 4/20/2020 1/23/2020 12/27/2019   Weight 186 lb 183 lb  182 lb 185 lb     He underwent DaVinci prostatectomy by Dr Rozina Tellez in [facility] 10/15. He has not changed over to [facility] after Dr Maritza Paris shelter. Only request is tril of something to help with his ED.   He briefly used cialis postop years ago     No gi complaints    LAST MEDICARE WELLNESS EXAM: 6/12/15, 6/20/16, 12/22/17, 12/27/18, 12/27/19, 12/28/20    IF 1/18    Past Medical History:   Diagnosis Date    Basal cell carcinoma     Dr Pedro Montes CAD (coronary artery disease) 4/12    s/p stent LAD Dr. Ellie Kelly Colon polyp     2007; Dr Lissette Melton 5/17    Erectile dysfunction after radical prostatectomy 12/28/2020    Frozen shoulder syndrome 2007    Dr. Harpreet Torres    Hyperlipidemia     IBS (irritable bowel syndrome)     Impaired fasting glucose 3/12    Lower urinary tract symptoms (LUTS)     Obesity     peak weight 217 lbs, bmi 32 from 12/14; IF 1/18 start weight 190+    Phymatous rosacea     Primary hypertension     Prostate cancer ([facility] [address]     T2C [place] 7 Dr Janice Kaplan 8/15; DVP Dr Ashley Diaz 10/15    Squamous cell cancer of skin of nose 2016    Dr Donovan Long; s/p Moh's    Zoster 1980s     Past Surgical History:   Procedure Laterality Date    HX CATARACT REMOVAL Bilateral 2019    Dr Jong Mclaughlin Rang 2007 polyp; Dr Mclaughlin Rang 5/17 polyp    HX CORONARY STENT PLACEMENT  3/12    3/12 prox LAD AJITH    HX HERNIA REPAIR  1988    LEFT IH    HX TONSILLECTOMY      HX UROLOGICAL  10/15    DaVinci prostatectomy  [facility] in [address], NEEDLE, SATURATION SAMPLING      7/08, 6/10, 5/10, 8/15     Social History     Socioeconomic History    Marital status:      Spouse name: Not on file    Number of children: 3    Years of education: Not on file    Highest education level: Not on file   Occupational History    Occupation: engr [employer]   Social Needs    Financial resource strain: Not on file    Food insecurity     Worry: Not on file     Inability: Not on file   [employer] needs     Medical: Not on file     Non-medical: Not on file   Tobacco Use    Smoking status: Never Smoker    Smokeless tobacco: Never Used   Substance and Sexual Activity    Alcohol use: Yes     Comment: greater than 15-20 12-ounce beers/week    Drug use: No    Sexual activity: Not on file   Lifestyle    Physical activity     Days per week: Not on file     Minutes per session: Not on file    Stress: Not on file   Relationships    Social connections     Talks on phone: Not on file     Gets together: Not on file     Attends Buddhism service: Not on file     Active member of club or organization: Not on file     Attends meetings of clubs or organizations: Not on file     Relationship status: Not on file    Intimate partner violence     Fear of current or ex partner: Not on file     Emotionally abused: Not on file     Physically abused: Not on file     Forced sexual activity: Not on file   Other Topics Concern    Not on file   Social History Narrative    Not on file     Current Outpatient Medications   Medication Sig    sildenafil citrate (VIAGRA) 100 mg tablet Take 1 Tab by mouth as needed for Erectile Dysfunction.  metoprolol tartrate (LOPRESSOR) 25 mg tablet Take 1 Tab by mouth two (2) times a day.     atorvastatin (LIPITOR) 40 mg tablet TAKE 1 TABLET EVERY DAY    nitroglycerin (NITROSTAT) 0.4 mg SL tablet DISSOLVE 1 TAB UNDER THE TONGUE EVERY 5 MINUTES AS NEEDED FOR CHEST PAIN FOR UP TO 3 DOSES AS DIRECTED    Cholecalciferol, Vitamin D3, 1,000 unit cap Take  by mouth as needed.  aspirin 81 mg tablet Take 1 Tab by mouth daily.  vitamin a-vitamin c-vit e-min (OCUVITE) tablet Take 1 Tab by mouth daily. No current facility-administered medications for this visit. No Known Allergies    REVIEW OF SYSTEMS: colo 5/17 Dr Ilan Logan, sees Dr Charlotte Cid  no vision change or eye pain  Oral  no mouth pain, tongue or tooth problems  Ears  no hearing loss, ear pain, fullness, no swallowing problems  Cardiac  no CP, PND, orthopnea, edema, palpitations or syncope  Chest  no breast masses  Resp  no wheezing, chronic coughing, dyspnea  GI  no heartburn, nausea, vomiting, change in bowel habits, bleeding, hemorrhoids  Urinary  no dysuria, hematuria, flank pain, urgency, frequency    Visit Vitals  /66   Pulse 66   Temp 97.2 °F (36.2 °C) (Temporal)   Resp 14   Ht 5' 9\" (1.753 m)   Wt 186 lb (84.4 kg)   SpO2 98%   BMI 27.47 kg/m²     Affect is appropriate. Mood stable  No apparent distress  HEENT --Anicteric sclerae. No thyromegaly, JVD, or bruits. Lungs --Clear to auscultation and percussion, normal percussion. Heart --Regular rate and rhythm, no murmurs, rubs, gallops, or clicks. Abdomen -- Soft and nontender, no hepatosplenomegaly or masses. Extremities -- Without cyanosis, clubbing, edema. 2+ pulses equally and bilaterally.    and rectal deferred to urology    LABS  From 5/10 showed   gluc 94,   cr 0.90, gfr>60,           wbc 5.4, hb 14.7, plt 185  From 6/10 showed   gluc 107, cr 1.00  From 2/12 showed                                   psa 5.5  From 4/13 showed                                     psa 5.7  From 6/13 showed   gluc 112, cr 0.89, gfr 89,   alt 14,           chol 132, tg 64, hdl 46, ldl-c 73, wbc 5.9, hb 14.2, plt 208, ua neg  From 11/13 showed                    hba1c 5.7, chol 120, tg 48, hdl 47, ldl-c 63  From 6/14 showed         hba1c 5.8, chol 130, tg 54, hdl 51, ldl-c 68, wbc 5.8, hb 14.5, plt 177  From 12/14 showed gluc 117, cr 0.80, gfr>60,  alt 19  hba1c 5.9, chol 138, tg 66, hdl 52, ldl-c 73  From 6/15 showed   gluc 104, cr 0.87, gfr>60,     hba1c 5.7,      wbc 4.8, hb 14.2, plt 161, psa 6.1  From 9/15 showed   gluc 84,   cr 1.01, gfr 76,   alt 34,       wbc 6.1, hb 13.9, plt 199, ua neg pro, inr 1.0 ptt 28, urine cx neg, ast 24  From 12/15 showed gluc 110, cr 0.97, gfr>60,    hba1c 5.6, chol 130, tg 58, hdl 59, ldl-c 59, wbc 6.0, hb 13.9, plt 197, psa<0.1  From 6/16 showed   gluc 113, cr 0.91, gfr>60,     hba1c 5.6   From 12/16 showed                     psa<0.1  From 6/17 showed                     psa<0.1  From 12/17 showed gluc 93,   cr 0.93, gfr>60,  alt 30,           chol 127, tg 63, hdl 71, ldl-c 43, wbc 5.3, hb 16.9, plt 181, psa<0.1  From 12/18 showed gluc 96,   cr 0.84, gfr>60,  alt 33,           chol 124, tg 50, hdl 63, ldl-c 51, wbc 6.9, hb 13.5, plt 174  From 12/19 showed gluc 112, cr 0.84, gfr>60, alt 45,           chol 153, tg 43, hdl 74, ldl-c 70, wbc 5.6, hb 14.3, plt 186    Results for orders placed or performed during the hospital encounter of 12/21/20   CBC W/O DIFF   Result Value Ref Range    WBC 5.5 4.6 - 13.2 K/uL    RBC 4.40 (L) 4.70 - 5.50 M/uL    HGB 13.9 13.0 - 16.0 g/dL    HCT 43.7 36.0 - 48.0 %    MCV 99.3 (H) 74.0 - 97.0 FL    MCH 31.6 24.0 - 34.0 PG    MCHC 31.8 31.0 - 37.0 g/dL    RDW 14.0 11.6 - 14.5 %    PLATELET 928 676 - 347 K/uL    MPV 11.0 9.2 - 11.8 FL   LIPID PANEL   Result Value Ref Range    LIPID PROFILE          Cholesterol, total 157 <200 MG/DL    Triglyceride 53 <150 MG/DL    HDL Cholesterol 81 (H) 40 - 60 MG/DL    LDL, calculated 65.4 0 - 100 MG/DL    VLDL, calculated 10.6 MG/DL    CHOL/HDL Ratio 1.9 0 - 5.0     METABOLIC PANEL, COMPREHENSIVE   Result Value Ref Range    Sodium 144 136 - 145 mmol/L    Potassium 4.6 3.5 - 5.5 mmol/L    Chloride 111 100 - 111 mmol/L    CO2 30 21 - 32 mmol/L    Anion gap 3 3.0 - 18 mmol/L    Glucose 113 (H) 74 - 99 mg/dL    BUN 13 7.0 - 18 MG/DL    Creatinine 0.93 0.6 - 1.3 MG/DL    BUN/Creatinine ratio 14 12 - 20      GFR est AA >60 >60 ml/min/1.73m2    GFR est non-AA >60 >60 ml/min/1.73m2    Calcium 8.8 8.5 - 10.1 MG/DL    Bilirubin, total 0.4 0.2 - 1.0 MG/DL    ALT (SGPT) 28 16 - 61 U/L    AST (SGOT) 23 10 - 38 U/L    Alk. phosphatase 87 45 - 117 U/L    Protein, total 6.8 6.4 - 8.2 g/dL    Albumin 4.0 3.4 - 5.0 g/dL    Globulin 2.8 2.0 - 4.0 g/dL    A-G Ratio 1.4 0.8 - 1.7       Patient Active Problem List   Diagnosis Code    Impaired fasting glucose R73.01    Hyperlipidemia E78.5    Essential hypertension I10    Coronary artery disease involving native coronary artery without angina pectoris Dr Mercedes Hawley I25.10    Advance directive discussed with patient Z71.89    History of coronary artery stent placement Z95.5    Overweight (BMI 25.0-29. 9) E66.3    History of prostate cancer Z85.46    Erectile dysfunction after radical prostatectomy N52.31     Assessment and plan:  1. H/O prostate ca. Per [facility]  2. IFG. Lifestyle and dietary measures, wt loss would be ideal  3. HLP. Continue statin  4. CHD. F/U Dr. Mercedes Hawley, continue current  5. HTN. Continue current regimen. 6. Overweight. Restart IF, dietary and lifestyle measures  7. ED. Trial of viagra which he preferred for now due to shorter duration; sfx discussed, he has not used nitrate in years        RTC 12/21    Above conditions discussed at length and patient vocalized understanding.   All questions answered to patient satisfaction

## 2020-12-28 ENCOUNTER — OFFICE VISIT (OUTPATIENT)
Dept: INTERNAL MEDICINE CLINIC | Age: 75
End: 2020-12-28
Payer: MEDICARE

## 2020-12-28 VITALS
HEART RATE: 66 BPM | OXYGEN SATURATION: 98 % | RESPIRATION RATE: 14 BRPM | SYSTOLIC BLOOD PRESSURE: 134 MMHG | WEIGHT: 186 LBS | BODY MASS INDEX: 27.55 KG/M2 | HEIGHT: 69 IN | TEMPERATURE: 97.2 F | DIASTOLIC BLOOD PRESSURE: 66 MMHG

## 2020-12-28 DIAGNOSIS — E66.3 OVERWEIGHT (BMI 25.0-29.9): ICD-10-CM

## 2020-12-28 DIAGNOSIS — E78.00 PURE HYPERCHOLESTEROLEMIA: ICD-10-CM

## 2020-12-28 DIAGNOSIS — Z00.00 MEDICARE ANNUAL WELLNESS VISIT, SUBSEQUENT: Primary | ICD-10-CM

## 2020-12-28 DIAGNOSIS — I10 ESSENTIAL HYPERTENSION: ICD-10-CM

## 2020-12-28 DIAGNOSIS — Z71.89 ADVANCED DIRECTIVES, COUNSELING/DISCUSSION: ICD-10-CM

## 2020-12-28 DIAGNOSIS — Z13.1 SCREENING FOR DIABETES MELLITUS: ICD-10-CM

## 2020-12-28 DIAGNOSIS — R73.01 IMPAIRED FASTING GLUCOSE: ICD-10-CM

## 2020-12-28 DIAGNOSIS — I25.10 CORONARY ARTERY DISEASE INVOLVING NATIVE CORONARY ARTERY OF NATIVE HEART WITHOUT ANGINA PECTORIS: ICD-10-CM

## 2020-12-28 DIAGNOSIS — N52.31 ERECTILE DYSFUNCTION AFTER RADICAL PROSTATECTOMY: ICD-10-CM

## 2020-12-28 DIAGNOSIS — Z85.46 HISTORY OF PROSTATE CANCER: ICD-10-CM

## 2020-12-28 PROCEDURE — G8510 SCR DEP NEG, NO PLAN REQD: HCPCS | Performed by: INTERNAL MEDICINE

## 2020-12-28 PROCEDURE — G8536 NO DOC ELDER MAL SCRN: HCPCS | Performed by: INTERNAL MEDICINE

## 2020-12-28 PROCEDURE — G8427 DOCREV CUR MEDS BY ELIG CLIN: HCPCS | Performed by: INTERNAL MEDICINE

## 2020-12-28 PROCEDURE — 99497 ADVNCD CARE PLAN 30 MIN: CPT | Performed by: INTERNAL MEDICINE

## 2020-12-28 PROCEDURE — G8752 SYS BP LESS 140: HCPCS | Performed by: INTERNAL MEDICINE

## 2020-12-28 PROCEDURE — 99214 OFFICE O/P EST MOD 30 MIN: CPT | Performed by: INTERNAL MEDICINE

## 2020-12-28 PROCEDURE — G8754 DIAS BP LESS 90: HCPCS | Performed by: INTERNAL MEDICINE

## 2020-12-28 PROCEDURE — 3017F COLORECTAL CA SCREEN DOC REV: CPT | Performed by: INTERNAL MEDICINE

## 2020-12-28 PROCEDURE — 1101F PT FALLS ASSESS-DOCD LE1/YR: CPT | Performed by: INTERNAL MEDICINE

## 2020-12-28 PROCEDURE — G0463 HOSPITAL OUTPT CLINIC VISIT: HCPCS | Performed by: INTERNAL MEDICINE

## 2020-12-28 PROCEDURE — G8419 CALC BMI OUT NRM PARAM NOF/U: HCPCS | Performed by: INTERNAL MEDICINE

## 2020-12-28 PROCEDURE — G0439 PPPS, SUBSEQ VISIT: HCPCS | Performed by: INTERNAL MEDICINE

## 2020-12-28 RX ORDER — SILDENAFIL 100 MG/1
100 TABLET, FILM COATED ORAL AS NEEDED
Qty: 10 TAB | Refills: 11 | Status: SHIPPED | OUTPATIENT
Start: 2020-12-28

## 2020-12-28 NOTE — PATIENT INSTRUCTIONS
Medicare Wellness Visit, Male The best way to live healthy is to have a lifestyle where you eat a well-balanced diet, exercise regularly, limit alcohol use, and quit all forms of tobacco/nicotine, if applicable. Regular preventive services are another way to keep healthy. Preventive services (vaccines, screening tests, monitoring & exams) can help personalize your care plan, which helps you manage your own care. Screening tests can find health problems at the earliest stages, when they are easiest to treat. [facility] follows the current, evidence-based guidelines published by the [facility] ([facility]) when recommending preventive services for our patients. Because we follow these guidelines, sometimes recommendations change over time as research supports it. (For example, a prostate screening blood test is no longer routinely recommended for men with no symptoms). Of course, you and your doctor may decide to screen more often for some diseases, based on your risk and co-morbidities (chronic disease you are already diagnosed with). Preventive services for you include: - Medicare offers their members a free annual wellness visit, which is time for you and your primary care provider to discuss and plan for your preventive service needs. Take advantage of this benefit every year! 
-All adults over age 72 should receive the recommended pneumonia vaccines. Current USPSTF guidelines recommend a series of two vaccines for the best pneumonia protection.  
-All adults should have a flu vaccine yearly and tetanus vaccine every 10 years. 
-All adults age 48 and older should receive the shingles vaccines (series of two vaccines). -All adults age 38-68 who are overweight should have a diabetes screening test once every three years. -Other screening tests & preventive services for persons with diabetes include: an eye exam to screen for diabetic retinopathy, a kidney function test, a foot exam, and stricter control over your cholesterol.  
-Cardiovascular screening for adults with routine risk involves an electrocardiogram (ECG) at intervals determined by the provider.  
-Colorectal cancer screening should be done for adults age 54-65 with no increased risk factors for colorectal cancer. There are a number of acceptable methods of screening for this type of cancer. Each test has its own benefits and drawbacks. Discuss with your provider what is most appropriate for you during your annual wellness visit. The different tests include: colonoscopy (considered the best screening method), a fecal occult blood test, a fecal DNA test, and sigmoidoscopy. 
-All adults born between [facility] should be screened once for Hepatitis C. 
-An Abdominal Aortic Aneurysm (AAA) Screening is recommended for men age 73-68 who has ever smoked in their lifetime. Here is a list of your current Health Maintenance items (your personalized list of preventive services) with a due date: 
Health Maintenance Due Topic Date Due  
 Hemoglobin A1C    06/01/2017  Glaucoma Screening   05/10/2019

## 2020-12-28 NOTE — ACP (ADVANCE CARE PLANNING)
Advance Care Planning     General Advance Care Planning (ACP) Conversation      Date of Conversation: 12/28/2020  Conducted with: Patient with Decision Making Capacity    Healthcare Decision Maker:     Click here to complete [employer] including selection of the [employer] Relationship (ie \"Primary\")  Today we documented Decision Maker(s) consistent with Legal Next of Kin hierarchy.     Content/Action Overview:   Has NO ACP documents/care preferences - information provided, considering goals and options  Reviewed DNR/DNI and patient elects Full Code (Attempt Resuscitation)  Topics discussed: ventilation preferences, hospitalization preferences and resuscitation preferences  Additional Comments: none     Length of Voluntary ACP Conversation in minutes:  16 minutes    Maty Sood MD

## 2021-01-18 ENCOUNTER — OFFICE VISIT (OUTPATIENT)
Dept: CARDIOLOGY CLINIC | Age: 76
End: 2021-01-18
Payer: MEDICARE

## 2021-01-18 VITALS
TEMPERATURE: 97.7 F | WEIGHT: 190 LBS | DIASTOLIC BLOOD PRESSURE: 83 MMHG | BODY MASS INDEX: 28.14 KG/M2 | SYSTOLIC BLOOD PRESSURE: 143 MMHG | HEART RATE: 59 BPM | HEIGHT: 69 IN

## 2021-01-18 DIAGNOSIS — I10 ESSENTIAL HYPERTENSION: ICD-10-CM

## 2021-01-18 DIAGNOSIS — I25.10 CORONARY ARTERY DISEASE INVOLVING NATIVE CORONARY ARTERY OF NATIVE HEART WITHOUT ANGINA PECTORIS: Primary | ICD-10-CM

## 2021-01-18 DIAGNOSIS — Z95.5 HISTORY OF CORONARY ARTERY STENT PLACEMENT: ICD-10-CM

## 2021-01-18 DIAGNOSIS — E78.00 PURE HYPERCHOLESTEROLEMIA: ICD-10-CM

## 2021-01-18 PROCEDURE — G8432 DEP SCR NOT DOC, RNG: HCPCS | Performed by: INTERNAL MEDICINE

## 2021-01-18 PROCEDURE — G8754 DIAS BP LESS 90: HCPCS | Performed by: INTERNAL MEDICINE

## 2021-01-18 PROCEDURE — 3017F COLORECTAL CA SCREEN DOC REV: CPT | Performed by: INTERNAL MEDICINE

## 2021-01-18 PROCEDURE — G8753 SYS BP > OR = 140: HCPCS | Performed by: INTERNAL MEDICINE

## 2021-01-18 PROCEDURE — G8536 NO DOC ELDER MAL SCRN: HCPCS | Performed by: INTERNAL MEDICINE

## 2021-01-18 PROCEDURE — G8427 DOCREV CUR MEDS BY ELIG CLIN: HCPCS | Performed by: INTERNAL MEDICINE

## 2021-01-18 PROCEDURE — 93000 ELECTROCARDIOGRAM COMPLETE: CPT | Performed by: INTERNAL MEDICINE

## 2021-01-18 PROCEDURE — 99214 OFFICE O/P EST MOD 30 MIN: CPT | Performed by: INTERNAL MEDICINE

## 2021-01-18 PROCEDURE — 1101F PT FALLS ASSESS-DOCD LE1/YR: CPT | Performed by: INTERNAL MEDICINE

## 2021-01-18 PROCEDURE — G8419 CALC BMI OUT NRM PARAM NOF/U: HCPCS | Performed by: INTERNAL MEDICINE

## 2021-01-18 RX ORDER — LISINOPRIL 2.5 MG/1
2.5 TABLET ORAL DAILY
Qty: 30 TAB | Refills: 1 | Status: SHIPPED | OUTPATIENT
Start: 2021-01-18 | End: 2021-02-09 | Stop reason: SDUPTHER

## 2021-01-18 NOTE — PROGRESS NOTES
HISTORY OF PRESENT ILLNESS  Sebastian Mancini is a 77 y.o. male. F/u CAD, old LAD PCI, HTN, HLD    Patient denies significant chest pain, SOB, palpitations, edema, dizziness      Cholesterol Problem  The history is provided by the medical records. This is a chronic problem. Pertinent negatives include no chest pain, no headaches and no shortness of breath. Hypertension  The history is provided by the medical records. This is a chronic problem. Pertinent negatives include no chest pain, no headaches and no shortness of breath. Review of Systems   Constitutional: Negative for chills, fever, malaise/fatigue and weight loss. HENT: Negative for nosebleeds. Eyes: Negative for discharge. Respiratory: Negative for cough, shortness of breath and wheezing. Cardiovascular: Negative for chest pain, palpitations, orthopnea, claudication, leg swelling and PND. Gastrointestinal: Negative for diarrhea, nausea and vomiting. Genitourinary: Negative for dysuria and hematuria. Musculoskeletal: Negative for joint pain. Skin: Negative for rash. Neurological: Negative for dizziness, seizures, loss of consciousness and headaches. Endo/Heme/Allergies: Negative for polydipsia. Does not bruise/bleed easily. Psychiatric/Behavioral: Negative for depression and substance abuse. The patient does not have insomnia.       No Known Allergies    Past Medical History:   Diagnosis Date    Basal cell carcinoma     Dr Pancho Barreto CAD (coronary artery disease) 4/12    s/p stent LAD Dr. Deonna Paris Colon polyp     2007; Dr Lavelle Reid 5/17    Erectile dysfunction after radical prostatectomy 12/28/2020    Frozen shoulder syndrome 2007    Dr. Jan Townsend    Hyperlipidemia     IBS (irritable bowel syndrome)     Impaired fasting glucose 3/12    Lower urinary tract symptoms (LUTS)     Obesity     peak weight 217 lbs, bmi 32 from 12/14; IF 1/18 start weight 190+    Phymatous rosacea     Primary hypertension     Prostate cancer [facility])     T2C [place] 7 Dr Rajni Moulton 8/15; DVP Dr Jonita Favre 10/15    Squamous cell cancer of skin of nose 2016    Dr Carly Arciniega; s/p Moh's    Zoster 1980s       Family History   Problem Relation Age of Onset    Heart Disease Mother     Lung Disease Father     Dementia Father     Stroke Paternal Grandmother        Social History     Tobacco Use    Smoking status: Never Smoker    Smokeless tobacco: Never Used   Substance Use Topics    Alcohol use: Yes     Comment: greater than 15-20 12-ounce beers/week    Drug use: No        Current Outpatient Medications   Medication Sig    sildenafil citrate (VIAGRA) 100 mg tablet Take 1 Tab by mouth as needed for Erectile Dysfunction.  metoprolol tartrate (LOPRESSOR) 25 mg tablet Take 1 Tab by mouth two (2) times a day.  atorvastatin (LIPITOR) 40 mg tablet TAKE 1 TABLET EVERY DAY    nitroglycerin (NITROSTAT) 0.4 mg SL tablet DISSOLVE 1 TAB UNDER THE TONGUE EVERY 5 MINUTES AS NEEDED FOR CHEST PAIN FOR UP TO 3 DOSES AS DIRECTED    Cholecalciferol, Vitamin D3, 1,000 unit cap Take  by mouth as needed.  aspirin 81 mg tablet Take 1 Tab by mouth daily. No current facility-administered medications for this visit. Past Surgical History:   Procedure Laterality Date    HX CATARACT REMOVAL Bilateral 2019    Dr White Favors      Dr Lorne Dawson 2007 polyp; Dr Lorne Dawson 5/17 polyp    HX CORONARY STENT PLACEMENT  3/12    3/12 prox LAD AJITH    HX HERNIA REPAIR  1988    LEFT IH    HX TONSILLECTOMY      HX UROLOGICAL  10/15    DaVinci prostatectomy Dr Nevin Pepe in [address] [place] [address], NEEDLE, SATURATION SAMPLING      7/08, 6/10, 5/10, 8/15       Diagnostic Studies:  No flowsheet data found. 1/18 Nuc Stress  Conclusion:   1. Normal perfusion scan. 2. Normal wall motion and ejection fraction. 3. No evidence of significant fixed or reversible defect suggesting ischemia or myocardial infarction noted from this nuclear study. 4.  Low risk scan.    Visit Vitals  BP (!) 143/83   Pulse (!) 59   Temp 97.7 °F (36.5 °C) (Temporal)   Ht 5' 9\" (1.753 m)   Wt 86.2 kg (190 lb)   BMI 28.06 kg/m²       Mr. Todd Fox has a reminder for a \"due or due soon\" health maintenance. I have asked that he contact his primary care provider for follow-up on this health maintenance. Physical Exam   Constitutional: He is oriented to person, place, and time. He appears well-developed and well-nourished. No distress. HENT:   Head: Normocephalic and atraumatic. Eyes: Right eye exhibits no discharge. Left eye exhibits no discharge. No scleral icterus. Neck: Neck supple. No JVD present. Carotid bruit is not present. No thyromegaly present. Cardiovascular: Normal rate, regular rhythm, S1 normal, S2 normal, normal heart sounds and intact distal pulses. Exam reveals no gallop and no friction rub. No murmur heard. Pulmonary/Chest: Effort normal and breath sounds normal. He has no wheezes. He has no rales. Abdominal: Soft. He exhibits no mass. There is no abdominal tenderness. Musculoskeletal:         General: No edema. Neurological: He is alert and oriented to person, place, and time. Skin: Skin is warm and dry. No rash noted. Psychiatric: He has a normal mood and affect. His behavior is normal.       ASSESSMENT and PLAN    HLD: Results for Garrett Valdez (MRN [id number]) as of 1/18/2021 08:13   Ref. Range 12/21/2020 08:04   Triglyceride Latest Ref Range: <150 MG/DL 53   Cholesterol, total Latest Ref Range: <200 MG/   HDL Cholesterol Latest Ref Range: 40 - 60 MG/DL 81 (H)   CHOL/HDL Ratio Latest Ref Range: 0 - 5.0   1.9   VLDL, calculated Latest Units: MG/DL 10.6   LDL, calculated Latest Ref Range: 0 - 100 MG/DL 65.4       History of coronary artery stent placement: 3/12 prox LAD AJITH    Patient is doing very well and stable from cardiac standpoint. He has lost significant weight. He has an active lifestyle.   Discussed the usual risk factors of cholesterol exercise and weight. Blood pressure mildly elevated but per patient resting pressures at home are in 120s. Check home chart and then make some adjustments. If needed, would likely add low-dose Norvasc. Diagnoses and all orders for this visit:    1. Coronary artery disease involving native coronary artery of native heart without angina pectoris  -     AMB POC EKG ROUTINE W/ 12 LEADS, INTER & REP        Pertinent laboratory and test data reviewed and discussed with patient.   See patient instructions also for other medical advice given    Medications Discontinued During This Encounter   Medication Reason    vitamin a-vitamin c-vit e-min (OCUVITE) tablet Therapy Completed

## 2021-01-18 NOTE — PROGRESS NOTES
1. Have you been to the ER, urgent care clinic since your last visit? Hospitalized since your last visit?     no  2. Have you seen or consulted any other health care providers outside of the [address] [place] [address] since your last visit? Include any pap smears or colon screening. Yes Where: pcp     3. Since your last visit, have you had any of the following symptoms?no        4. Have you had any blood work, X-rays or cardiac testing? Yes Where: pcp         5. Where do you normally have your labs drawn? Dr. Marina Melchor    6. Do you need any refills today?    no

## 2021-01-18 NOTE — PATIENT INSTRUCTIONS
Medications Discontinued During This Encounter Medication Reason  vitamin a-vitamin c-vit e-min (OCUVITE) tablet Therapy Completed After the recommended changes have been made in blood pressure medicines, patient advised to keep BP/HR(pulse rate) chart twice daily and bring us results in next 4 to 5 days. Patient may send the results via \"My Chart\" if desired. Please rest for 5-10 minutes before checking blood pressure. Sit on a comfortable chair without crossing the legs and put your arm on a table. We recommend that you use an upper arm cuff. Check the blood pressure 3 times each time you check the blood pressure and record the lowest reading. If you check the blood pressure in both arms, use the higher reading. Learning About the [address] [facility] Diet What is the Mediterranean diet? The Mediterranean diet is a style of eating rather than a diet plan. It features foods eaten in [place], [place], [place] and [place], and other countries along the [facility]. It emphasizes eating foods like fish, fruits, vegetables, beans, high-fiber breads and whole grains, nuts, and olive oil. This style of eating includes limited red meat, cheese, and sweets. Why choose the Mediterranean diet? A Mediterranean-style diet may improve heart health. It contains more fat than other heart-healthy diets. But the fats are mainly from nuts, unsaturated oils (such as fish oils and olive oil), and certain nut or seed oils (such as canola, soybean, or flaxseed oil). These fats may help protect the heart and blood vessels. How can you get started on the Mediterranean diet? Here are some things you can do to switch to a more Mediterranean way of eating. What to eat · Eat a variety of fruits and vegetables each day, such as grapes, blueberries, tomatoes, broccoli, peppers, figs, olives, spinach, eggplant, beans, lentils, and chickpeas. · Eat a variety of whole-grain foods each day, such as oats, brown rice, and whole wheat bread, pasta, and couscous. · Eat fish at least 2 times a week. Try tuna, salmon, mackerel, lake trout, herring, or sardines. · Eat moderate amounts of low-fat dairy products, such as milk, cheese, or yogurt. · Eat moderate amounts of poultry and eggs. · Choose healthy (unsaturated) fats, such as nuts, olive oil, and certain nut or seed oils like canola, soybean, and flaxseed. · Limit unhealthy (saturated) fats, such as butter, palm oil, and coconut oil. And limit fats found in animal products, such as meat and dairy products made with whole milk. Try to eat red meat only a few times a month in very small amounts. · Limit sweets and desserts to only a few times a week. This includes sugar-sweetened drinks like soda. The Mediterranean diet may also include red wine with your meal1 glass each day for women and up to 2 glasses a day for men. Tips for eating at home · Use herbs, spices, garlic, lemon zest, and citrus juice instead of salt to add flavor to foods. · Add avocado slices to your sandwich instead of el[place]. · Have fish for lunch or dinner instead of red meat. Brush the fish with olive oil, and broil or grill it. · Sprinkle your salad with seeds or nuts instead of cheese. · Cook with olive or canola oil instead of butter or oils that are high in saturated fat. · Switch from 2% milk or whole milk to 1% or fat-free milk. · Dip raw vegetables in a vinaigrette dressing or hummus instead of dips made from mayonnaise or sour cream. 
· Have a piece of fruit for dessert instead of a piece of cake. Try baked apples, or have some dried fruit. Tips for eating out · Try broiled, grilled, baked, or poached fish instead of having it fried or breaded. · Ask your  to have your meals prepared with olive oil instead of butter. · Order dishes made with marinara sauce or sauces made from olive oil. Avoid sauces made from cream or mayonnaise. · Choose whole-grain breads, whole wheat pasta and pizza crust, brown rice, beans, and lentils. · Cut back on butter or margarine on bread. Instead, you can dip your bread in a small amount of olive oil. · Ask for a side salad or grilled vegetables instead of french fries or chips. Where can you learn more? Go to http://www."[employer]/ Enter 554-053-5332 in the search box to learn more about \"Learning About the Mediterranean Diet. \" Current as of: August 22, 2019               Content Version: 12.6 © 1192-1064 [employer]. Care instructions adapted under license by [place] (which disclaims liability or warranty for this information). If you have questions about a medical condition or this instruction, always ask your healthcare professional. [place] [address] any warranty or liability for your use of this information.

## 2021-01-22 ENCOUNTER — HOSPITAL ENCOUNTER (OUTPATIENT)
Dept: LAB | Age: 76
Discharge: HOME | End: 2021-01-22
Payer: MEDICARE

## 2021-01-22 DIAGNOSIS — I10 ESSENTIAL HYPERTENSION: ICD-10-CM

## 2021-01-22 LAB
ANION GAP SERPL CALC-SCNC: 6 MMOL/L (ref 3–18)
BUN SERPL-MCNC: 17 MG/DL (ref 7–18)
BUN/CREAT SERPL: 20 (ref 12–20)
CALCIUM SERPL-MCNC: 9.1 MG/DL (ref 8.5–10.1)
CHLORIDE SERPL-SCNC: 106 MMOL/L (ref 100–111)
CO2 SERPL-SCNC: 30 MMOL/L (ref 21–32)
CREAT SERPL-MCNC: 0.83 MG/DL (ref 0.6–1.3)
GLUCOSE SERPL-MCNC: 89 MG/DL (ref 74–99)
POTASSIUM SERPL-SCNC: 4.5 MMOL/L (ref 3.5–5.5)
SODIUM SERPL-SCNC: 142 MMOL/L (ref 136–145)

## 2021-01-22 PROCEDURE — 80048 BASIC METABOLIC PNL TOTAL CA: CPT

## 2021-01-22 PROCEDURE — 36415 COLL VENOUS BLD VENIPUNCTURE: CPT

## 2021-02-09 DIAGNOSIS — I10 ESSENTIAL HYPERTENSION: ICD-10-CM

## 2021-02-09 RX ORDER — LISINOPRIL 2.5 MG/1
2.5 TABLET ORAL DAILY
Qty: 90 TAB | Refills: 3 | Status: SHIPPED | OUTPATIENT
Start: 2021-02-09 | End: 2021-11-04

## 2021-02-22 RX ORDER — ATORVASTATIN CALCIUM 40 MG/1
TABLET, FILM COATED ORAL
Qty: 90 TAB | Refills: 3 | Status: SHIPPED | OUTPATIENT
Start: 2021-02-22 | End: 2022-02-21

## 2021-05-13 DIAGNOSIS — I25.10 CORONARY ARTERY DISEASE INVOLVING NATIVE CORONARY ARTERY OF NATIVE HEART WITHOUT ANGINA PECTORIS: ICD-10-CM

## 2021-05-13 RX ORDER — METOPROLOL TARTRATE 25 MG/1
TABLET, FILM COATED ORAL
Qty: 180 TAB | Refills: 2 | Status: SHIPPED | OUTPATIENT
Start: 2021-05-13 | End: 2021-12-22

## 2021-06-07 ENCOUNTER — OFFICE VISIT (OUTPATIENT)
Dept: INTERNAL MEDICINE CLINIC | Age: 76
End: 2021-06-07
Payer: MEDICARE

## 2021-06-07 VITALS
WEIGHT: 190 LBS | HEART RATE: 79 BPM | RESPIRATION RATE: 16 BRPM | SYSTOLIC BLOOD PRESSURE: 132 MMHG | OXYGEN SATURATION: 97 % | DIASTOLIC BLOOD PRESSURE: 74 MMHG | BODY MASS INDEX: 28.14 KG/M2 | HEIGHT: 69 IN | TEMPERATURE: 97.5 F

## 2021-06-07 DIAGNOSIS — K40.90 LEFT INGUINAL HERNIA: Primary | ICD-10-CM

## 2021-06-07 PROCEDURE — 1101F PT FALLS ASSESS-DOCD LE1/YR: CPT | Performed by: INTERNAL MEDICINE

## 2021-06-07 PROCEDURE — G8419 CALC BMI OUT NRM PARAM NOF/U: HCPCS | Performed by: INTERNAL MEDICINE

## 2021-06-07 PROCEDURE — G8754 DIAS BP LESS 90: HCPCS | Performed by: INTERNAL MEDICINE

## 2021-06-07 PROCEDURE — G8536 NO DOC ELDER MAL SCRN: HCPCS | Performed by: INTERNAL MEDICINE

## 2021-06-07 PROCEDURE — G8752 SYS BP LESS 140: HCPCS | Performed by: INTERNAL MEDICINE

## 2021-06-07 PROCEDURE — G0463 HOSPITAL OUTPT CLINIC VISIT: HCPCS | Performed by: INTERNAL MEDICINE

## 2021-06-07 PROCEDURE — 99213 OFFICE O/P EST LOW 20 MIN: CPT | Performed by: INTERNAL MEDICINE

## 2021-06-07 PROCEDURE — G8510 SCR DEP NEG, NO PLAN REQD: HCPCS | Performed by: INTERNAL MEDICINE

## 2021-06-07 PROCEDURE — G8427 DOCREV CUR MEDS BY ELIG CLIN: HCPCS | Performed by: INTERNAL MEDICINE

## 2021-06-07 PROCEDURE — 3017F COLORECTAL CA SCREEN DOC REV: CPT | Performed by: INTERNAL MEDICINE

## 2021-06-07 NOTE — PROGRESS NOTES
77 y.o. WHITE male who presents for evaluation. He has had a prior inguinal hernia repair back in the 80s but he is not sure now if it was on the right or left. The last week or so, has been having a vague discomfort in the left lower quadrant/inguinal area. Yesterday, he was golfing and he noted inc discomfort there and he finally examined himself and there was a bulge. He lay down and \"pushed it back in\" and it has not come back out since then. Obviously concerned about a hernia being present. Past Medical History:   Diagnosis Date    CAD (coronary artery disease) 4/12    s/p stent LAD Dr. Justus Littlejohn Colon polyp     2007; Dr Stevie Pedraza 5/17    Erectile dysfunction after radical prostatectomy 12/28/2020    Frozen shoulder syndrome 2007    Dr. Clarence Bean    Hyperlipidemia     Hypertension     IBS (irritable bowel syndrome)     Impaired fasting glucose 3/12    Lower urinary tract symptoms (LUTS)     Obesity     peak weight 217 lbs, bmi 32 from 12/14; IF 1/18 start weight 190+    Phymatous rosacea     Prostate cancer (HCC)     T2C [place] 7 Dr Siva Benson 8/15; DVP Dr Cristian Negron 10/15    Skin cancer     Dr Clark May; [facility] nose s/p Moh's 2016    Zoster 1980s     Past Surgical History:   Procedure Laterality Date    HX CATARACT REMOVAL Bilateral 2019    Dr Nanette Pedraza 2007 polyp; Dr Stevie Pedraza 5/17 polyp    HX CORONARY STENT PLACEMENT  3/12    3/12 prox LAD AJITH    HX HERNIA REPAIR  1988    LEFT IH    HX TONSILLECTOMY      HX TURP  05/24/2010    Dr. Star Cui HX TURP  06/18/2010    Dr. Suzy Alarcon  10/15    DaVinci prostatectomy Dr Justin Hy in [place]      Dr Erin Cabrera prostate bx neg (7/08)    HX UROLOGICAL  08/26/2015    Dr Siva Benson prostate bx [place] 3+4 in [place].  [place] 3+3 in [place]     Social History     Socioeconomic History    Marital status:      Spouse name: Not on file    Number of children: 3    Years of education: Not on file    Highest education level: Not on file   Occupational History    Occupation: engr [employer]   Tobacco Use    Smoking status: Never Smoker    Smokeless tobacco: Never Used   Vaping Use    Vaping Use: Never used   Substance and Sexual Activity    Alcohol use: Yes     Comment: greater than 15-20 12-ounce beers/week    Drug use: No    Sexual activity: Not on file   Other Topics Concern    Not on file   Social History Narrative    Not on file     Social Determinants of Health     Financial Resource Strain:     Difficulty of Paying Living Expenses:    Food Insecurity:     Worried About Running Out of Food in the Last Year:     Ran Out of Food in the Last Year:    Transportation Needs:     Lack of Transportation (Medical):  Lack of Transportation (Non-Medical):    Physical Activity:     Days of Exercise per Week:     Minutes of Exercise per Session:    Stress:     Feeling of Stress :    Social Connections:     Frequency of Communication with Friends and Family:     Frequency of Social Gatherings with Friends and Family:     Attends Zoroastrian Services:     Active Member of Clubs or Organizations:     Attends Club or Organization Meetings:     Marital Status:    Intimate Partner Violence:     Fear of Current or Ex-Partner:     Emotionally Abused:     Physically Abused:     Sexually Abused:      Current Outpatient Medications   Medication Sig    metoprolol tartrate (LOPRESSOR) 25 mg tablet TAKE 1 TABLET TWICE DAILY    cetirizine (ZYRTEC) 10 mg tablet Take  by mouth.  atorvastatin (LIPITOR) 40 mg tablet TAKE 1 TABLET EVERY DAY    lisinopriL (PRINIVIL, ZESTRIL) 2.5 mg tablet Take 1 Tab by mouth daily.  sildenafil citrate (VIAGRA) 100 mg tablet Take 1 Tab by mouth as needed for Erectile Dysfunction.     nitroglycerin (NITROSTAT) 0.4 mg SL tablet DISSOLVE 1 TAB UNDER THE TONGUE EVERY 5 MINUTES AS NEEDED FOR CHEST PAIN FOR UP TO 3 DOSES AS DIRECTED    Cholecalciferol, Vitamin D3, 1,000 unit cap Take  by mouth as needed.  aspirin 81 mg tablet Take 1 Tab by mouth daily. No current facility-administered medications for this visit. No Known Allergies    Visit Vitals  /74 (BP 1 Location: Left arm, BP Patient Position: Sitting)   Pulse 79   Temp 97.5 °F (36.4 °C) (Temporal)   Resp 16   Ht 5' 9\" (1.753 m)   Wt 190 lb (86.2 kg)   SpO2 97%   BMI 28.06 kg/m²   Abdomen soft, nontender, no hepatosplenomegaly or masses.  and groin exam revealed no obvious herniations that I could elicit. No scrotal masses or tenderness. No bruising or rash. Assessment and plan:  1. Possible hernia? I am unable to isolate it at this time. He is interested in getting second opinion from Dr. Maira Winkler, specifically, so referral was sent. Above conditions discussed at length and patient vocalized understanding.   All questions answered to patient satisfaction

## 2021-07-28 ENCOUNTER — OFFICE VISIT (OUTPATIENT)
Dept: SURGERY | Age: 76
End: 2021-07-28
Payer: MEDICARE

## 2021-07-28 VITALS
BODY MASS INDEX: 27.99 KG/M2 | HEIGHT: 69 IN | RESPIRATION RATE: 18 BRPM | HEART RATE: 64 BPM | SYSTOLIC BLOOD PRESSURE: 136 MMHG | DIASTOLIC BLOOD PRESSURE: 72 MMHG | WEIGHT: 189 LBS | TEMPERATURE: 98 F | OXYGEN SATURATION: 96 %

## 2021-07-28 DIAGNOSIS — K40.90 LEFT INGUINAL HERNIA: ICD-10-CM

## 2021-07-28 DIAGNOSIS — K40.21 BILATERAL RECURRENT INGUINAL HERNIA WITHOUT OBSTRUCTION OR GANGRENE: ICD-10-CM

## 2021-07-28 DIAGNOSIS — K40.90 RIGHT INGUINAL HERNIA: Primary | ICD-10-CM

## 2021-07-28 PROCEDURE — G8432 DEP SCR NOT DOC, RNG: HCPCS | Performed by: SURGERY

## 2021-07-28 PROCEDURE — 1101F PT FALLS ASSESS-DOCD LE1/YR: CPT | Performed by: SURGERY

## 2021-07-28 PROCEDURE — 3017F COLORECTAL CA SCREEN DOC REV: CPT | Performed by: SURGERY

## 2021-07-28 PROCEDURE — G8427 DOCREV CUR MEDS BY ELIG CLIN: HCPCS | Performed by: SURGERY

## 2021-07-28 PROCEDURE — G8752 SYS BP LESS 140: HCPCS | Performed by: SURGERY

## 2021-07-28 PROCEDURE — G8536 NO DOC ELDER MAL SCRN: HCPCS | Performed by: SURGERY

## 2021-07-28 PROCEDURE — G8419 CALC BMI OUT NRM PARAM NOF/U: HCPCS | Performed by: SURGERY

## 2021-07-28 PROCEDURE — 99204 OFFICE O/P NEW MOD 45 MIN: CPT | Performed by: SURGERY

## 2021-07-28 PROCEDURE — G8754 DIAS BP LESS 90: HCPCS | Performed by: SURGERY

## 2021-07-28 NOTE — PROGRESS NOTES
Review of Systems   Constitutional: Negative. HENT: Negative. Eyes: Negative. Respiratory: Negative. Cardiovascular: Negative. Gastrointestinal: Negative. Genitourinary: Negative. Musculoskeletal: Negative. Skin: Negative. Neurological: Positive for dizziness. Negative for tingling, tremors, sensory change, speech change, focal weakness, seizures, loss of consciousness, weakness and headaches. Endo/Heme/Allergies: Negative. Psychiatric/Behavioral: Negative.

## 2021-07-28 NOTE — PROGRESS NOTES
[facility]  General Surgery    Subjective:      HPI: Patient is a very pleasant 44-year-old male with a past medical history remarkable for hypertension, hyperlipidemia, coronary artery disease status post stent 2012, erectile dysfunction status post prostate cancer with radical prostatectomy in 2020. He is referred by Dr. Erin Mccormack for evaluation and management of a left inguinal hernia. The patient states that he was playing golf 4 to 6 weeks ago and he felt pain in the lower abdomen in the left groin as he was exiting a sand pit. He denies any nausea vomiting diarrhea constipation. He has had a bulge in that area that is stuck out about 3 inches he has been able to push it back in.   He has a history of a right inguinal hernia repair in the past.    Patient Active Problem List    Diagnosis Date Noted    Erectile dysfunction after radical prostatectomy 12/28/2020    History of prostate cancer 12/27/2019    Overweight (BMI 25.0-29.9) 12/17/2017    History of coronary artery stent placement 10/02/2017    Advance directive discussed with patient 06/17/2016    Essential hypertension 10/05/2015    Coronary artery disease involving native coronary artery without angina pectoris Dr Ashley Danielson 10/05/2015    Impaired fasting glucose 03/20/2012    Hyperlipidemia 03/20/2012     Past Medical History:   Diagnosis Date    CAD (coronary artery disease) 4/12    s/p stent LAD Dr. Harvinder Blackman Colon polyp     2007; Dr Gato Longoria 5/17    Erectile dysfunction after radical prostatectomy 12/28/2020    Frozen shoulder syndrome 2007    Dr. Ivory Aguirre    Hyperlipidemia     Hypertension     IBS (irritable bowel syndrome)     Impaired fasting glucose 3/12    Lower urinary tract symptoms (LUTS)     Obesity     peak weight 217 lbs, bmi 32 from 12/14; IF 1/18 start weight 190+    Phymatous rosacea     Prostate cancer ([facility] [address]     T2C [place] 7 Dr Cassandra Barahona 8/15; DVP Dr Gretchen Cordova 10/15    Skin cancer     Dr Traci May; [facility] nose s/p [facility]'s 2016    Stool color black     irritable bowel    Vertigo     Zoster 1980s      Past Surgical History:   Procedure Laterality Date    HX CATARACT REMOVAL Bilateral 2019    Dr Davide Hair 2007 polyp; Dr Chaim Hair 5/17 polyp    HX CORONARY STENT PLACEMENT  3/12    3/12 prox LAD AJITH    HX HERNIA REPAIR  1988    LEFT IH    HX TONSILLECTOMY      HX TURP  05/24/2010    Dr. Hutchison Figures HX TURP  06/18/2010    Dr. Vivian Hutchinson  10/15    DaVinci prostatectomy Dr Luis Felipe Cardenas in [facility]      Dr Clayton Austin prostate bx neg (7/08)    HX UROLOGICAL  08/26/2015    Dr Carrasco Prnubia prostate bx [place] 3+4 in [place]. [place] 3+3 in [place]      Family History   Problem Relation Age of Onset    Heart Disease Mother     Lung Disease Father     Dementia Father     Stroke Paternal Grandmother       Social History     Tobacco Use    Smoking status: Never Smoker    Smokeless tobacco: Never Used   Substance Use Topics    Alcohol use: Yes     Alcohol/week: 2.0 - 3.0 standard drinks     Types: 2 - 3 Cans of beer per week     Comment: 2-3 beers daily      No Known Allergies    Prior to Admission medications    Medication Sig Start Date End Date Taking? Authorizing Provider   metoprolol tartrate (LOPRESSOR) 25 mg tablet TAKE 1 TABLET TWICE DAILY 5/13/21  Yes Deloris Cota NP   cetirizine (ZYRTEC) 10 mg tablet Take  by mouth. Yes Provider, Historical   atorvastatin (LIPITOR) 40 mg tablet TAKE 1 TABLET EVERY DAY 2/22/21  Yes Nel Xavier NP   lisinopriL (PRINIVIL, ZESTRIL) 2.5 mg tablet Take 1 Tab by mouth daily. 2/9/21  Yes Nel Xavier NP   nitroglycerin (NITROSTAT) 0.4 mg SL tablet DISSOLVE 1 TAB UNDER THE TONGUE EVERY 5 MINUTES AS NEEDED FOR CHEST PAIN FOR UP TO 3 DOSES AS DIRECTED 2/17/20  Yes Ena Greenwood MD   Cholecalciferol, Vitamin D3, 1,000 unit cap Take  by mouth as needed.    Yes Provider, Historical   aspirin 81 mg tablet Take 1 Tab by mouth daily. 3/20/12  Yes Cathryn Belcher NP   sildenafil citrate (VIAGRA) 100 mg tablet Take 1 Tab by mouth as needed for Erectile Dysfunction. 12/28/20   Kacie Pham MD       Review of Systems:    14 systems were reviewed. The results are as above in the HPI and otherwise negative. Objective:     Vitals:    07/28/21 0934 07/28/21 0939   BP: (!) 146/78 136/72   Pulse: 64    Resp: 18    Temp: 98 °F (36.7 °C)    SpO2: 96%    Weight: 85.7 kg (189 lb)    Height: 5' 9\" (1.753 m)        Physical Exam:  GENERAL: alert, cooperative, no distress, appears stated age,   EYE: conjunctivae/corneas clear. PERRL, EOM's intact. THROAT & NECK: normal and no erythema or exudates noted. ,    LYMPHATIC: Cervical, supraclavicular, and axillary nodes normal. ,   LUNG: clear to auscultation bilaterally,   HEART: regular rate and rhythm, S1, S2 normal, no murmur, click, rub or gallop,   ABDOMEN: soft, non-tender. I am unable to definitively palpate a defect or hernia in the left groin with cough or Valsalva. Bowel sounds normal. No masses,  no organomegaly,   EXTREMITIES:  extremities normal, atraumatic, no cyanosis or edema,   SKIN: Normal.,   NEUROLOGIC: AOx3. Cranial nerves 2-12 and sensation grossly intact. ,     Data Review: CT pelvis with IV contrast    Mr. Nallely Pineda has a reminder for a \"due or due soon\" health maintenance. I have asked that he contact his primary care provider for follow-up on this health maintenance. Impression:     · Patient with left groin pain possibly from a new hernia.     Plan:     · CT pelvis with IV contrast only  · Follow-up in 3 weeks    Signed By: Trip Mistry MD     July 28, 2021

## 2021-08-04 NOTE — PATIENT INSTRUCTIONS
Inguinal Hernia Repair: Before Your Surgery  What is inguinal hernia repair? Inguinal hernia repair is a type of surgery. An inguinal hernia is a bulge under the skin in your groin. It happens when there is a weak spot in the groin muscle and a piece of the intestines or tissue pokes through the muscle. This can be painful. You may have pain when you're active. Or it may be painful when you strain during a bowel movement or lift something heavy. Surgery can help with your pain. It can also prevent serious problems that can happen if an organ or tissue gets stuck in the hernia. There are two ways to do this surgery. In open surgery, the doctor makes one cut near the hernia. This cut is called an incision. In laparoscopic surgery, the doctor makes several very small incisions and uses a thin, lighted scope and small tools. During surgery, the doctor pushes the bulge back in place. The doctor may place a piece of mesh on top of the bulge to help keep it in place. Then the healthy tissue is sewn back together. Laparoscopic surgery leaves several small scars. Open surgery leaves one long scar. The scars fade with time. After the surgery, you can probably return to light activity after 1 to 3 weeks. How long it takes will depend on the type of surgery. Follow-up care is a key part of your treatment and safety. Be sure to make and go to all appointments, and call your doctor if you are having problems. It's also a good idea to know your test results and keep a list of the medicines you take. How do you prepare for surgery? Surgery can be stressful. This information will help you understand what you can expect. And it will help you safely prepare for surgery. Preparing for surgery    · Be sure you have someone to take you home.  Anesthesia and pain medicine will make it unsafe for you to drive or get home on your own.     · Understand exactly what surgery is planned, along with the risks, benefits, and other options.     · If you take aspirin or some other blood thinner, ask your doctor if you should stop taking it before your surgery. Make sure that you understand exactly what your doctor wants you to do. These medicines increase the risk of bleeding.     · Tell your doctor ALL the medicines, vitamins, supplements, and herbal remedies you take. Some may increase the risk of problems during your surgery. Your doctor will tell you if you should stop taking any of them before the surgery and how soon to do it.     · Make sure your doctor and the hospital have a copy of your advance directive. If you don't have one, you may want to prepare one. It lets others know your health care wishes. It's a good thing to have before any type of surgery or procedure. What happens on the day of surgery? · Follow the instructions exactly about when to stop eating and drinking. If you don't, your surgery may be canceled. If your doctor told you to take your medicines on the day of surgery, take them with only a sip of water.     · Take a bath or shower before you come in for your surgery. Do not apply lotions, perfumes, deodorants, or nail polish.     · Do not shave the surgical site yourself.     · Take off all jewelry and piercings. And take out contact lenses, if you wear them. At the hospital or surgery center   · Bring a picture ID.     · The area for surgery is often marked to make sure there are no errors.     · You will be kept comfortable and safe by your anesthesia provider. The anesthesia may make you sleep. Or it may just numb the area being worked on.     · The surgery will take about 1 to 2 hours. When should you call your doctor? · You have questions or concerns.     · You don't understand how to prepare for your surgery.     · You become ill before the surgery (such as fever, flu, or a cold).     · You need to reschedule or have changed your mind about having the surgery. Where can you learn more?   Go to http://www.gray.com/  Enter P932 in the search box to learn more about \"Inguinal Hernia Repair: Before Your Surgery. \"  Current as of: April 15, 2020               Content Version: 12.8  © 2006-2021 [employer]. Care instructions adapted under license by [facility] (which disclaims liability or warranty for this information). If you have questions about a medical condition or this instruction, always ask your healthcare professional. [address] any warranty or liability for your use of this information.

## 2021-08-12 ENCOUNTER — HOSPITAL ENCOUNTER (OUTPATIENT)
Dept: CT IMAGING | Age: 76
Discharge: HOME | End: 2021-08-12
Attending: SURGERY
Payer: MEDICARE

## 2021-08-12 DIAGNOSIS — K40.21 BILATERAL RECURRENT INGUINAL HERNIA WITHOUT OBSTRUCTION OR GANGRENE: ICD-10-CM

## 2021-08-12 LAB — CREAT UR-MCNC: 0.9 MG/DL (ref 0.6–1.3)

## 2021-08-12 PROCEDURE — 72193 CT PELVIS W/DYE: CPT

## 2021-08-12 PROCEDURE — 82565 ASSAY OF CREATININE: CPT

## 2021-08-12 PROCEDURE — 74011000636 HC RX REV CODE- 636: Performed by: SURGERY

## 2021-08-12 RX ADMIN — IOPAMIDOL 100 ML: 612 INJECTION, SOLUTION INTRAVENOUS at 10:30

## 2021-11-03 DIAGNOSIS — I10 ESSENTIAL HYPERTENSION: ICD-10-CM

## 2021-11-04 RX ORDER — LISINOPRIL 2.5 MG/1
TABLET ORAL
Qty: 90 TABLET | Refills: 3 | Status: SHIPPED | OUTPATIENT
Start: 2021-11-04 | End: 2022-09-14

## 2021-12-20 ENCOUNTER — HOSPITAL ENCOUNTER (OUTPATIENT)
Dept: LAB | Age: 76
Discharge: HOME | End: 2021-12-20
Payer: MEDICARE

## 2021-12-20 ENCOUNTER — APPOINTMENT (OUTPATIENT)
Dept: INTERNAL MEDICINE CLINIC | Age: 76
End: 2021-12-20

## 2021-12-20 DIAGNOSIS — R73.01 IMPAIRED FASTING GLUCOSE: ICD-10-CM

## 2021-12-20 DIAGNOSIS — E78.00 PURE HYPERCHOLESTEROLEMIA: ICD-10-CM

## 2021-12-20 DIAGNOSIS — Z85.46 HISTORY OF PROSTATE CANCER: ICD-10-CM

## 2021-12-20 LAB
ALBUMIN SERPL-MCNC: 4.3 G/DL (ref 3.4–5)
ALBUMIN/GLOB SERPL: 1.5 {RATIO} (ref 0.8–1.7)
ALP SERPL-CCNC: 77 U/L (ref 45–117)
ALT SERPL-CCNC: 30 U/L (ref 16–61)
ANION GAP SERPL CALC-SCNC: 5 MMOL/L (ref 3–18)
AST SERPL-CCNC: 22 U/L (ref 10–38)
BILIRUB SERPL-MCNC: 0.4 MG/DL (ref 0.2–1)
BUN SERPL-MCNC: 16 MG/DL (ref 7–18)
BUN/CREAT SERPL: 16 (ref 12–20)
CALCIUM SERPL-MCNC: 9.9 MG/DL (ref 8.5–10.1)
CHLORIDE SERPL-SCNC: 111 MMOL/L (ref 100–111)
CHOLEST SERPL-MCNC: 153 MG/DL
CO2 SERPL-SCNC: 29 MMOL/L (ref 21–32)
CREAT SERPL-MCNC: 0.98 MG/DL (ref 0.6–1.3)
ERYTHROCYTE [DISTWIDTH] IN BLOOD BY AUTOMATED COUNT: 12.9 % (ref 11.6–14.5)
GLOBULIN SER CALC-MCNC: 2.8 G/DL (ref 2–4)
GLUCOSE SERPL-MCNC: 101 MG/DL (ref 74–99)
HBA1C MFR BLD: 5.6 % (ref 4.2–5.6)
HCT VFR BLD AUTO: 45.3 % (ref 36–48)
HDLC SERPL-MCNC: 76 MG/DL (ref 40–60)
HDLC SERPL: 2 {RATIO} (ref 0–5)
HGB BLD-MCNC: 14.1 G/DL (ref 13–16)
LDLC SERPL CALC-MCNC: 58.2 MG/DL (ref 0–100)
LIPID PROFILE,FLP: ABNORMAL
MCH RBC QN AUTO: 30.8 PG (ref 24–34)
MCHC RBC AUTO-ENTMCNC: 31.1 G/DL (ref 31–37)
MCV RBC AUTO: 98.9 FL (ref 78–100)
NRBC # BLD: 0 K/UL (ref 0–0.01)
NRBC BLD-RTO: 0 PER 100 WBC
PLATELET # BLD AUTO: 191 K/UL (ref 135–420)
PMV BLD AUTO: 10.2 FL (ref 9.2–11.8)
POTASSIUM SERPL-SCNC: 5 MMOL/L (ref 3.5–5.5)
PROT SERPL-MCNC: 7.1 G/DL (ref 6.4–8.2)
PSA SERPL-MCNC: 0 NG/ML (ref 0–4)
RBC # BLD AUTO: 4.58 M/UL (ref 4.35–5.65)
SODIUM SERPL-SCNC: 145 MMOL/L (ref 136–145)
TRIGL SERPL-MCNC: 94 MG/DL (ref ?–150)
VLDLC SERPL CALC-MCNC: 18.8 MG/DL
WBC # BLD AUTO: 5.6 K/UL (ref 4.6–13.2)

## 2021-12-20 PROCEDURE — 80053 COMPREHEN METABOLIC PANEL: CPT

## 2021-12-20 PROCEDURE — 85027 COMPLETE CBC AUTOMATED: CPT

## 2021-12-20 PROCEDURE — 84153 ASSAY OF PSA TOTAL: CPT

## 2021-12-20 PROCEDURE — 80061 LIPID PANEL: CPT

## 2021-12-20 PROCEDURE — 83036 HEMOGLOBIN GLYCOSYLATED A1C: CPT

## 2021-12-21 NOTE — PROGRESS NOTES
This is a subsequent Medicare Annual Wellness Exam     I have reviewed the patient's medical history in detail and updated the computerized patient record. Assessment/Plan   Education and counseling provided:  Are appropriate based on today's review and evaluation  End-of-Life planning (with patient's consent)  Influenza Vaccine  Colorectal cancer screening tests  Cardiovascular screening blood test  Diabetes screening test    1. Medicare annual wellness visit, subsequent  2. Essential hypertension  3. Coronary artery disease involving native coronary artery of native heart without angina pectoris  4. Impaired fasting glucose  -     METABOLIC PANEL, COMPREHENSIVE; Future  -     HEMOGLOBIN A1C WITH EAG; Future  5. Pure hypercholesterolemia  -     CBC W/O DIFF; Future  -     LIPID PANEL; Future  6. Overweight (BMI 25.0-29.9)  7. History of prostate cancer  8. Advanced directives, counseling/discussion  -     ADVANCE CARE PLANNING FIRST 30 MINS       Depression Risk Factor Screening     3 most recent PHQ Screens 12/27/2021   Little interest or pleasure in doing things Not at all   Feeling down, depressed, irritable, or hopeless Not at all   Total Score PHQ 2 0       Alcohol & Drug Abuse Risk Screen    Do you average more than 1 drink per night or more than 7 drinks a week: No    In the past three months have you have had more than 4 drinks containing alcohol on one occasion: No          Functional Ability and Level of Safety    Hearing: Hearing is good. Activities of Daily Living: The home contains: no safety equipment. Patient does total self care      Ambulation: with no difficulty     Fall Risk:  Fall Risk Assessment, last 12 mths 12/27/2021   Able to walk? Yes   Fall in past 12 months? 0   Do you feel unsteady?  0   Are you worried about falling 0      Abuse Screen:  Patient is not abused       Cognitive Screening    Has your family/caregiver stated any concerns about your memory: no     Cognitive Screening: Normal - Mini Cog Test    Health Maintenance Due     Health Maintenance Due   Topic Date Due    COVID-19 Vaccine (3 - Booster for Iam Almaraz series) 09/30/2021       Patient Care Team   Patient Care Team:  Chago Ulloa MD as PCP - General (Internal Medicine)  Chago Ulloa MD as PCP [facility]  Ellis Sherwood MD (Urology)    History     Patient Active Problem List   Diagnosis Code    Impaired fasting glucose R73.01    Hyperlipidemia E78.5    Essential hypertension I10    Coronary artery disease involving native coronary artery without angina pectoris Dr Josh Markham I25.10    Advance directive discussed with patient Z71.89    History of coronary artery stent placement Z95.5    Overweight (BMI 25.0-29. 9) E66.3    History of prostate cancer Z85.46    Erectile dysfunction after radical prostatectomy N52.31     Past Medical History:   Diagnosis Date    CAD (coronary artery disease) 4/12    s/p stent LAD Dr. Marshal Alaniz Colon polyp     2007; Dr Lea Bone 5/17    Erectile dysfunction after radical prostatectomy 12/28/2020    Frozen shoulder syndrome 2007    Dr. Cayetano Hogan    Hyperlipidemia     Hypertension     IBS (irritable bowel syndrome)     IFG (impaired fasting glucose) 03/2012    Lower urinary tract symptoms (LUTS)     Obesity     peak weight 217 lbs, bmi 32 from 12/14; IF 1/18 start weight 190+    Phymatous rosacea     Prostate cancer (HCC)     T2C [place] 7 Dr Aubrey Damon 8/15; DVP Dr Arianna Nash 10/15    Skin cancer     Dr Alondra May; [facility] nose s/p Moh's 2016    Vertigo     Zoster 1980s      Past Surgical History:   Procedure Laterality Date    HX CATARACT REMOVAL Bilateral 2019    Dr Damir Bone 2007 polyp; Dr Lea Bone 5/17 polyp    HX CORONARY STENT PLACEMENT  3/12    3/12 prox LAD AJITH    HX HERNIA REPAIR  1988    LEFT IH    HX PROSTATECTOMY  10/2015    DVP Dr Shruti Tate [facility] HX TURP  05/24/2010     Eliecer Reyesjamie HX TURP  06/18/2010    Dr. Goldie Franks prostate bx neg (7/08); Dr Carlos Keen prostate bx [place] 3+4 in [place] [place]. GS 3+3 in [place], [place] (8/15)     Current Outpatient Medications   Medication Sig Dispense Refill    metoprolol tartrate (LOPRESSOR) 25 mg tablet TAKE 1 TABLET TWICE DAILY 180 Tablet 2    lisinopriL (PRINIVIL, ZESTRIL) 2.5 mg tablet TAKE 1 TABLET EVERY DAY 90 Tablet 3    cetirizine (ZYRTEC) 10 mg tablet Take  by mouth.  atorvastatin (LIPITOR) 40 mg tablet TAKE 1 TABLET EVERY DAY 90 Tab 3    sildenafil citrate (VIAGRA) 100 mg tablet Take 1 Tab by mouth as needed for Erectile Dysfunction. 10 Tab 11    nitroglycerin (NITROSTAT) 0.4 mg SL tablet DISSOLVE 1 TAB UNDER THE TONGUE EVERY 5 MINUTES AS NEEDED FOR CHEST PAIN FOR UP TO 3 DOSES AS DIRECTED 25 Tab 0    Cholecalciferol, Vitamin D3, 1,000 unit cap Take  by mouth as needed.  aspirin 81 mg tablet Take 1 Tab by mouth daily. 80 Tab 3     No Known Allergies    Family History   Problem Relation Age of Onset    Heart Disease Mother     Lung Disease Father     Dementia Father     Stroke Paternal Grandmother      Social History     Tobacco Use    Smoking status: Never Smoker    Smokeless tobacco: Never Used   Substance Use Topics    Alcohol use:  Yes     Alcohol/week: 2.0 - 3.0 standard drinks     Types: 2 - 3 Cans of beer per week     Comment: 2-3 beers daily         Jero Smith MD

## 2021-12-21 NOTE — PROGRESS NOTES
77 y.o. WHITE/NON- male who presents for f/u    Denies any cardiovascular complaints and he sees Dr. LETY CEDILLO [facility] yearly now. Walks, plays golf and table tennis, lawn work and no problems to report. He is currently wrapping the underside of his house as his current project    Denies polyuria, polydipsia, nocturia, vision change. Not checking sugars at this time. Weight is up some as the diet could be better    Vitals 12/27/2021 7/28/2021 7/28/2021 6/7/2021 5/11/2021   Weight 191 lb 12.8 oz  189 lb 190 lb 180 lb     He underwent DaVinci prostatectomy by Dr Ruth Metz in [facility] 10/15. Sees [facility] yearly now     No gi complaints.   He did see Dr Adelina Bunn for opinion regarding possible recurrent hernia but CT unrevealing and sx have abated so he wants to keep watching for now    LAST MEDICARE WELLNESS EXAM: 6/12/15, 6/20/16, 12/22/17, 12/27/18, 12/27/19, 12/28/20, 12/27/21    IF 1/18    Past Medical History:   Diagnosis Date    CAD (coronary artery disease) 4/12    s/p stent LAD Dr. Mc Sloan Colon polyp     2007; Dr Evelio Carlin 5/17    Erectile dysfunction after radical prostatectomy 12/28/2020    Frozen shoulder syndrome 2007    Dr. Jerrell Rodriguez Hyperlipidemia     Hypertension     IBS (irritable bowel syndrome)     IFG (impaired fasting glucose) 03/2012    Lower urinary tract symptoms (LUTS)     Obesity     peak weight 217 lbs, bmi 32 from 12/14; IF 1/18 start weight 190+    Phymatous rosacea     Prostate cancer ([facility] [address]     T2C [place] 7 Dr Estefania Madrid 8/15; DVP Dr Pablito Valdez 10/15    Skin cancer     Dr Capri May; [facility] nose s/p Moh's 2016    Vertigo     Zoster 1980s     Past Surgical History:   Procedure Laterality Date    HX CATARACT REMOVAL Bilateral 2019    Dr Sanjay Carlin 2007 polyp; Dr Evelio Carlin 5/17 polyp    HX CORONARY STENT PLACEMENT  3/12    3/12 prox LAD AJITH     [facility]    LEFT IH    HX PROSTATECTOMY  10/2015    DVP Dr Shazia Ndiaye in [address] TONSILLECTOMY      HX TURP  05/24/2010    Dr. Laure Peraza HX TURP  06/18/2010    Dr. Wally Chin prostate bx neg (7/08); Dr Jarrod Thorpe prostate bx [place] 3+4 in [place] [place]. GS 3+3 in [place], [place] (8/15)     Social History     Socioeconomic History    Marital status:      Spouse name: Not on file    Number of children: 3    Years of education: Not on file    Highest education level: Not on file   Occupational History    Occupation: engr [employer]   Tobacco Use    Smoking status: Never Smoker    Smokeless tobacco: Never Used   Vaping Use    Vaping Use: Never used   Substance and Sexual Activity    Alcohol use: Yes     Alcohol/week: 2.0 - 3.0 standard drinks     Types: 2 - 3 Cans of beer per week     Comment: 2-3 beers daily    Drug use: No    Sexual activity: Not on file   Other Topics Concern    Not on file   Social History Narrative    Not on file     Social Determinants of Health     Financial Resource Strain:     Difficulty of Paying Living Expenses: Not on file   Food Insecurity:     Worried About Running Out of Food in the Last Year: Not on file    Dm of Food in the Last Year: Not on file   Transportation Needs:     Lack of Transportation (Medical): Not on file    Lack of Transportation (Non-Medical):  Not on file   Physical Activity:     Days of Exercise per Week: Not on file    Minutes of Exercise per Session: Not on file   Stress:     Feeling of Stress : Not on file   Social Connections:     Frequency of Communication with Friends and Family: Not on file    Frequency of Social Gatherings with Friends and Family: Not on file    Attends Gnosticism Services: Not on file    Active Member of Clubs or Organizations: Not on file    Attends Club or Organization Meetings: Not on file    Marital Status: Not on file   Intimate Partner Violence:     Fear of Current or Ex-Partner: Not on file    Emotionally Abused: Not on file    Physically Abused: Not on file   [facility] Sexually Abused: Not on file   Housing Stability:     Unable to Pay for Housing in the Last Year: Not on file    Number of Places Lived in the Last Year: Not on file    Unstable Housing in the Last Year: Not on file     Current Outpatient Medications   Medication Sig    metoprolol tartrate (LOPRESSOR) 25 mg tablet TAKE 1 TABLET TWICE DAILY    lisinopriL (PRINIVIL, ZESTRIL) 2.5 mg tablet TAKE 1 TABLET EVERY DAY    cetirizine (ZYRTEC) 10 mg tablet Take  by mouth.  atorvastatin (LIPITOR) 40 mg tablet TAKE 1 TABLET EVERY DAY    sildenafil citrate (VIAGRA) 100 mg tablet Take 1 Tab by mouth as needed for Erectile Dysfunction.  nitroglycerin (NITROSTAT) 0.4 mg SL tablet DISSOLVE 1 TAB UNDER THE TONGUE EVERY 5 MINUTES AS NEEDED FOR CHEST PAIN FOR UP TO 3 DOSES AS DIRECTED    Cholecalciferol, Vitamin D3, 1,000 unit cap Take  by mouth as needed.  aspirin 81 mg tablet Take 1 Tab by mouth daily. No current facility-administered medications for this visit. No Known Allergies    REVIEW OF SYSTEMS: colo 5/17 Dr Alton Nicolas, sees Holly Renner  Ophtho - no vision change or eye pain  Oral - no mouth pain, tongue or tooth problems  Ears - no hearing loss, ear pain, fullness, no swallowing problems  Cardiac - no CP, PND, orthopnea, edema, palpitations or syncope  Chest - no breast masses  Resp - no wheezing, chronic coughing, dyspnea  GI - no heartburn, nausea, vomiting, change in bowel habits, bleeding, hemorrhoids  Urinary - no dysuria, hematuria, flank pain, urgency, frequency    Visit Vitals  /78   Pulse 61   Temp 97.7 °F (36.5 °C) (Temporal)   Resp 16   Ht 5' 9\" (1.753 m)   Wt 191 lb 12.8 oz (87 kg)   SpO2 98%   BMI 28.32 kg/m²     Affect is appropriate. Mood stable  No apparent distress  HEENT --Anicteric sclerae. No thyromegaly, JVD, or bruits. Lungs --Clear to auscultation and percussion, normal percussion. Heart --Regular rate and rhythm, no murmurs, rubs, gallops, or clicks.   Abdomen -- Soft and nontender, no hepatosplenomegaly or masses. Extremities -- Without cyanosis, clubbing, edema. 2+ pulses equally and bilaterally.    and rectal deferred to urology    LABS  From 5/10 showed   gluc 94,   cr 0.90, gfr>60,           wbc 5.4, hb 14.7, plt 185  From 6/10 showed   gluc 107, cr 1.00  From 2/12 showed                                   psa 5.5  From 4/13 showed                                     psa 5.7  From 6/13 showed   gluc 112, cr 0.89, gfr 89,   alt 14,           chol 132, tg 64, hdl 46, ldl-c 73, wbc 5.9, hb 14.2, plt 208, ua neg  From 11/13 showed                    hba1c 5.7, chol 120, tg 48, hdl 47, ldl-c 63  From 6/14 showed         hba1c 5.8, chol 130, tg 54, hdl 51, ldl-c 68, wbc 5.8, hb 14.5, plt 177  From 12/14 showed gluc 117, cr 0.80, gfr>60,  alt 19  hba1c 5.9, chol 138, tg 66, hdl 52, ldl-c 73  From 6/15 showed   gluc 104, cr 0.87, gfr>60,     hba1c 5.7,      wbc 4.8, hb 14.2, plt 161, psa 6.1  From 9/15 showed   gluc 84,   cr 1.01, gfr 76,   alt 34,       wbc 6.1, hb 13.9, plt 199, ua neg pro, inr 1.0 ptt 28, urine cx neg, ast 24  From 12/15 showed gluc 110, cr 0.97, gfr>60,    hba1c 5.6, chol 130, tg 58, hdl 59, ldl-c 59, wbc 6.0, hb 13.9, plt 197, psa<0.1  From 6/16 showed   gluc 113, cr 0.91, gfr>60,     hba1c 5.6   From 12/16 showed                     psa<0.1  From 6/17 showed                     psa<0.1  From 12/17 showed gluc 93,   cr 0.93, gfr>60,  alt 30,           chol 127, tg 63, hdl 71, ldl-c 43, wbc 5.3, hb 16.9, plt 181, psa<0.1  From 12/18 showed gluc 96,   cr 0.84, gfr>60,  alt 33,           chol 124, tg 50, hdl 63, ldl-c 51, wbc 6.9, hb 13.5, plt 174  From 12/19 showed gluc 112, cr 0.84, gfr>60, alt 45,           chol 153, tg 43, hdl 74, ldl-c 70, wbc 5.6, hb 14.3, plt 186  From 12/20 showed gluc 113, cr 0.93, gfr>60, alt 28,           chol 157, tg 53, hdl 81, ldl-c 65, wbc 5.5, hb 13.9, plt 205    Results for orders placed or performed during the hospital encounter of 12/20/21   CBC W/O DIFF   Result Value Ref Range    WBC 5.6 4.6 - 13.2 K/uL    RBC 4.58 4.35 - 5.65 M/uL    HGB 14.1 13.0 - 16.0 g/dL    HCT 45.3 36.0 - 48.0 %    MCV 98.9 78.0 - 100.0 FL    MCH 30.8 24.0 - 34.0 PG    MCHC 31.1 31.0 - 37.0 g/dL    RDW 12.9 11.6 - 14.5 %    PLATELET 459 580 - 262 K/uL    MPV 10.2 9.2 - 11.8 FL    NRBC 0.0 0  WBC    ABSOLUTE NRBC 0.00 0.00 - 0.01 K/uL   LIPID PANEL   Result Value Ref Range    LIPID PROFILE          Cholesterol, total 153 <200 MG/DL    Triglyceride 94 <150 MG/DL    HDL Cholesterol 76 (H) 40 - 60 MG/DL    LDL, calculated 58.2 0 - 100 MG/DL    VLDL, calculated 18.8 MG/DL    CHOL/HDL Ratio 2.0 0 - 5.0     METABOLIC PANEL, COMPREHENSIVE   Result Value Ref Range    Sodium 145 136 - 145 mmol/L    Potassium 5.0 3.5 - 5.5 mmol/L    Chloride 111 100 - 111 mmol/L    CO2 29 21 - 32 mmol/L    Anion gap 5 3.0 - 18 mmol/L    Glucose 101 (H) 74 - 99 mg/dL    BUN 16 7.0 - 18 MG/DL    Creatinine 0.98 0.6 - 1.3 MG/DL    BUN/Creatinine ratio 16 12 - 20      GFR est AA >60 >60 ml/min/1.73m2    GFR est non-AA >60 >60 ml/min/1.73m2    Calcium 9.9 8.5 - 10.1 MG/DL    Bilirubin, total 0.4 0.2 - 1.0 MG/DL    ALT (SGPT) 30 16 - 61 U/L    AST (SGOT) 22 10 - 38 U/L    Alk.  phosphatase 77 45 - 117 U/L    Protein, total 7.1 6.4 - 8.2 g/dL    Albumin 4.3 3.4 - 5.0 g/dL    Globulin 2.8 2.0 - 4.0 g/dL    A-G Ratio 1.5 0.8 - 1.7     PSA, DIAGNOSTIC (PROSTATE SPECIFIC AG)   Result Value Ref Range    Prostate Specific Ag 0.0 0.0 - 4.0 ng/mL   HEMOGLOBIN A1C W/O EAG   Result Value Ref Range    Hemoglobin A1c 5.6 4.2 - 5.6 %     We reviewed the patient's labs from the last several visits to point out trends in the numbers            Patient Active Problem List   Diagnosis Code    Impaired fasting glucose R73.01    Hyperlipidemia E78.5    Essential hypertension I10    Coronary artery disease involving native coronary artery without angina pectoris Dr Gasper Seip I25.10    Advance directive discussed with patient Z70.80    History of coronary artery stent placement Z95.5    Overweight (BMI 25.0-29. 9) E66.3    History of prostate cancer Z85.46    Erectile dysfunction after radical prostatectomy N52.31     Assessment and plan:  1. H/O prostate ca. Per [facility]  2. IFG. Lifestyle and dietary measures, wt loss would be ideal  3. HLP. At target on lipitor  4. CHD. F/U Dr. Jorje Velasco, continue current  5. HTN. Controlled on current regimen. 6. Overweight. Dietary and lifestyle measures        RTC 12/22    Above conditions discussed at length and patient vocalized understanding. All questions answered to patient satisfaction          ICD-10-CM ICD-9-CM    1. Essential hypertension  I10 401.9    2. Coronary artery disease involving native coronary artery of native heart without angina pectoris  I25.10 414.01    3. Impaired fasting glucose  V34.36 328.24 METABOLIC PANEL, COMPREHENSIVE      HEMOGLOBIN A1C WITH EAG   4. Pure hypercholesterolemia  E78.00 272.0 CBC W/O DIFF      LIPID PANEL   5. Overweight (BMI 25.0-29. 9)  E66.3 278.02    6.  History of prostate cancer  Z85.46 V10.46

## 2021-12-22 DIAGNOSIS — I25.10 CORONARY ARTERY DISEASE INVOLVING NATIVE CORONARY ARTERY OF NATIVE HEART WITHOUT ANGINA PECTORIS: ICD-10-CM

## 2021-12-22 RX ORDER — METOPROLOL TARTRATE 25 MG/1
TABLET, FILM COATED ORAL
Qty: 180 TABLET | Refills: 2 | Status: SHIPPED | OUTPATIENT
Start: 2021-12-22 | End: 2022-08-08

## 2021-12-27 ENCOUNTER — OFFICE VISIT (OUTPATIENT)
Dept: INTERNAL MEDICINE CLINIC | Age: 76
End: 2021-12-27
Payer: MEDICARE

## 2021-12-27 VITALS
RESPIRATION RATE: 16 BRPM | BODY MASS INDEX: 28.41 KG/M2 | WEIGHT: 191.8 LBS | DIASTOLIC BLOOD PRESSURE: 78 MMHG | HEART RATE: 61 BPM | SYSTOLIC BLOOD PRESSURE: 122 MMHG | OXYGEN SATURATION: 98 % | HEIGHT: 69 IN | TEMPERATURE: 97.7 F

## 2021-12-27 DIAGNOSIS — Z00.00 MEDICARE ANNUAL WELLNESS VISIT, SUBSEQUENT: Primary | ICD-10-CM

## 2021-12-27 DIAGNOSIS — E78.00 PURE HYPERCHOLESTEROLEMIA: ICD-10-CM

## 2021-12-27 DIAGNOSIS — R73.01 IMPAIRED FASTING GLUCOSE: ICD-10-CM

## 2021-12-27 DIAGNOSIS — I25.10 CORONARY ARTERY DISEASE INVOLVING NATIVE CORONARY ARTERY OF NATIVE HEART WITHOUT ANGINA PECTORIS: ICD-10-CM

## 2021-12-27 DIAGNOSIS — E66.3 OVERWEIGHT (BMI 25.0-29.9): ICD-10-CM

## 2021-12-27 DIAGNOSIS — Z85.46 HISTORY OF PROSTATE CANCER: ICD-10-CM

## 2021-12-27 DIAGNOSIS — Z71.89 ADVANCED DIRECTIVES, COUNSELING/DISCUSSION: ICD-10-CM

## 2021-12-27 DIAGNOSIS — I10 ESSENTIAL HYPERTENSION: ICD-10-CM

## 2021-12-27 PROCEDURE — G8752 SYS BP LESS 140: HCPCS | Performed by: INTERNAL MEDICINE

## 2021-12-27 PROCEDURE — 99214 OFFICE O/P EST MOD 30 MIN: CPT | Performed by: INTERNAL MEDICINE

## 2021-12-27 PROCEDURE — 99497 ADVNCD CARE PLAN 30 MIN: CPT | Performed by: INTERNAL MEDICINE

## 2021-12-27 PROCEDURE — G8427 DOCREV CUR MEDS BY ELIG CLIN: HCPCS | Performed by: INTERNAL MEDICINE

## 2021-12-27 PROCEDURE — G8536 NO DOC ELDER MAL SCRN: HCPCS | Performed by: INTERNAL MEDICINE

## 2021-12-27 PROCEDURE — G0439 PPPS, SUBSEQ VISIT: HCPCS | Performed by: INTERNAL MEDICINE

## 2021-12-27 PROCEDURE — G0463 HOSPITAL OUTPT CLINIC VISIT: HCPCS | Performed by: INTERNAL MEDICINE

## 2021-12-27 PROCEDURE — G8419 CALC BMI OUT NRM PARAM NOF/U: HCPCS | Performed by: INTERNAL MEDICINE

## 2021-12-27 PROCEDURE — G8754 DIAS BP LESS 90: HCPCS | Performed by: INTERNAL MEDICINE

## 2021-12-27 PROCEDURE — 3017F COLORECTAL CA SCREEN DOC REV: CPT | Performed by: INTERNAL MEDICINE

## 2021-12-27 PROCEDURE — G8510 SCR DEP NEG, NO PLAN REQD: HCPCS | Performed by: INTERNAL MEDICINE

## 2021-12-27 PROCEDURE — 1101F PT FALLS ASSESS-DOCD LE1/YR: CPT | Performed by: INTERNAL MEDICINE

## 2021-12-27 NOTE — ACP (ADVANCE CARE PLANNING)
Advance Care Planning     Advance Care Planning (ACP) Physician/NP/PA Conversation      Date of Conversation: 12/27/2021  Conducted with: Patient with Decision Making Capacity    Healthcare Decision Maker:     Primary Decision Maker: Afshin Adjutant - Spouse - [phone]  Click here to complete [employer] including selection of the Healthcare Decision Maker Relationship (ie \"Primary\")        Today we documented Decision Maker(s) consistent with Legal Next of Kin hierarchy. Care Preferences:    Hospitalization: \"If your health worsens and it becomes clear that your chance of recovery is unlikely, what would be your preference regarding hospitalization? \"  The patient would prefer hospitalization. Ventilation: \"If you were unable to breathe on your own and your chance of recovery was unlikely, what would be your preference about the use of a ventilator (breathing machine) if it was available to you? \"   The patient would desire the use of a ventilator. Resuscitation: \"In the event your heart stopped as a result of an underlying serious health condition, would you want attempts to be made to restart your heart, or would you prefer a natural death? \"   Yes, attempt to resuscitate.     Additional topics discussed: ventilation preferences, hospitalization preferences and resuscitation preferences    Conversation Outcomes / Follow-Up Plan:   ACP in process - information provided, considering goals and options  Reviewed DNR/DNI and patient elects Full Code (Attempt Resuscitation)     Length of Voluntary ACP Conversation in minutes:  16 minutes    Johnathon Mohs, MD

## 2021-12-27 NOTE — PATIENT INSTRUCTIONS
Medicare Wellness Visit, Male    The best way to live healthy is to have a lifestyle where you eat a well-balanced diet, exercise regularly, limit alcohol use, and quit all forms of tobacco/nicotine, if applicable. Regular preventive services are another way to keep healthy. Preventive services (vaccines, screening tests, monitoring & exams) can help personalize your care plan, which helps you manage your own care. Screening tests can find health problems at the earliest stages, when they are easiest to treat. [facility] follows the current, evidence-based guidelines published by the [facility] ([facility]) when recommending preventive services for our patients. Because we follow these guidelines, sometimes recommendations change over time as research supports it. (For example, a prostate screening blood test is no longer routinely recommended for men with no symptoms). Of course, you and your doctor may decide to screen more often for some diseases, based on your risk and co-morbidities (chronic disease you are already diagnosed with). Preventive services for you include:  - Medicare offers their members a free annual wellness visit, which is time for you and your primary care provider to discuss and plan for your preventive service needs. Take advantage of this benefit every year!  -All adults over age 72 should receive the recommended pneumonia vaccines. Current USPSTF guidelines recommend a series of two vaccines for the best pneumonia protection.   -All adults should have a flu vaccine yearly and tetanus vaccine every 10 years.  -All adults age 48 and older should receive the shingles vaccines (series of two vaccines).        -All adults age 38-68 who are overweight should have a diabetes screening test once every three years.   -Other screening tests & preventive services for persons with diabetes include: an eye exam to screen for diabetic retinopathy, a kidney function test, a foot exam, and stricter control over your cholesterol.   -Cardiovascular screening for adults with routine risk involves an electrocardiogram (ECG) at intervals determined by the provider.   -Colorectal cancer screening should be done for adults age 54-65 with no increased risk factors for colorectal cancer. There are a number of acceptable methods of screening for this type of cancer. Each test has its own benefits and drawbacks. Discuss with your provider what is most appropriate for you during your annual wellness visit. The different tests include: colonoscopy (considered the best screening method), a fecal occult blood test, a fecal DNA test, and sigmoidoscopy.  -All adults born between [facility] should be screened once for Hepatitis C.  -An Abdominal Aortic Aneurysm (AAA) Screening is recommended for men age 73-68 who has ever smoked in their lifetime.      Here is a list of your current Health Maintenance items (your personalized list of preventive services) with a due date:  Health Maintenance Due   Topic Date Due    COVID-19 Vaccine (3 - Booster for Alis Reas series) 09/30/2021

## 2021-12-27 NOTE — PROGRESS NOTES
Chief Complaint   Patient presents with   [facility] Wellness Visit    Labs     completed 12/10/2021       1. \"Have you been to the ER, urgent care clinic since your last visit? Hospitalized since your last visit? \" No    2. \"Have you seen or consulted any other health care providers outside of the [address] [place] [address] since your last visit? \" No     3. For patients aged 39-70: Has the patient had a colonoscopy / FIT/ Cologuard? Yes, HM satisfied with blue hyperlink     If the patient is female:    4. For patients aged 41-77: Has the patient had a mammogram within the past 2 years? NA based on age or sex    11. For patients aged 21-65: Has the patient had a pap smear?  NA based on age or sex

## 2022-01-18 ENCOUNTER — OFFICE VISIT (OUTPATIENT)
Dept: SURGERY | Age: 77
End: 2022-01-18
Payer: MEDICARE

## 2022-01-18 VITALS
SYSTOLIC BLOOD PRESSURE: 164 MMHG | RESPIRATION RATE: 17 BRPM | HEIGHT: 69 IN | OXYGEN SATURATION: 98 % | TEMPERATURE: 98.1 F | WEIGHT: 189 LBS | DIASTOLIC BLOOD PRESSURE: 80 MMHG | BODY MASS INDEX: 27.99 KG/M2 | HEART RATE: 73 BPM

## 2022-01-18 DIAGNOSIS — K40.90 LEFT INGUINAL HERNIA: Primary | ICD-10-CM

## 2022-01-18 DIAGNOSIS — Z01.818 PRE-OP TESTING: ICD-10-CM

## 2022-01-18 PROCEDURE — G8753 SYS BP > OR = 140: HCPCS | Performed by: SURGERY

## 2022-01-18 PROCEDURE — 99215 OFFICE O/P EST HI 40 MIN: CPT | Performed by: SURGERY

## 2022-01-18 PROCEDURE — G8536 NO DOC ELDER MAL SCRN: HCPCS | Performed by: SURGERY

## 2022-01-18 PROCEDURE — G8427 DOCREV CUR MEDS BY ELIG CLIN: HCPCS | Performed by: SURGERY

## 2022-01-18 PROCEDURE — 3017F COLORECTAL CA SCREEN DOC REV: CPT | Performed by: SURGERY

## 2022-01-18 PROCEDURE — G8754 DIAS BP LESS 90: HCPCS | Performed by: SURGERY

## 2022-01-18 PROCEDURE — G8432 DEP SCR NOT DOC, RNG: HCPCS | Performed by: SURGERY

## 2022-01-18 PROCEDURE — G8419 CALC BMI OUT NRM PARAM NOF/U: HCPCS | Performed by: SURGERY

## 2022-01-18 PROCEDURE — 1101F PT FALLS ASSESS-DOCD LE1/YR: CPT | Performed by: SURGERY

## 2022-01-18 RX ORDER — SODIUM CHLORIDE 0.9 % (FLUSH) 0.9 %
5-40 SYRINGE (ML) INJECTION EVERY 8 HOURS
Status: CANCELLED | OUTPATIENT
Start: 2022-01-18

## 2022-01-18 RX ORDER — SODIUM CHLORIDE 0.9 % (FLUSH) 0.9 %
5-40 SYRINGE (ML) INJECTION AS NEEDED
Status: CANCELLED | OUTPATIENT
Start: 2022-01-18

## 2022-01-18 NOTE — PROGRESS NOTES
[facility]  General Surgery    Subjective:     CC: Left inguinal bulge and pain     HPI: Patient is a very pleasant 42-year-old male with past medical history remarkable for hypertension, hyperlipidemia, coronary artery disease status post stent, skin cancer, prostate cancer status post prostatectomy with erectile dysfunction, frozen shoulder and advanced directive. He returns today for assessment of the left groin pain and occasional bulge. The CAT scan which was performed last year did not reveal evidence of hernia on the left side of the right side. He does have history of prior open right inguinal hernia repair. He states that since our last visit he has had several episodes where the hernia pops through the abdominal wall. He is able to reduce it. It is not presently bulging. He has occasional pain which is not very severe. He would like to have the hernia repair while he is in his off season of golf.     Patient Active Problem List    Diagnosis Date Noted    Erectile dysfunction after radical prostatectomy 12/28/2020    History of prostate cancer 12/27/2019    Overweight (BMI 25.0-29.9) 12/17/2017    History of coronary artery stent placement 10/02/2017    Advance directive discussed with patient 06/17/2016    Essential hypertension 10/05/2015    Coronary artery disease involving native coronary artery without angina pectoris Dr Mere Cespedes 10/05/2015    Impaired fasting glucose 03/20/2012    Hyperlipidemia 03/20/2012     Past Medical History:   Diagnosis Date    CAD (coronary artery disease) 4/12    s/p stent LAD Dr. Leslie Numbers Colon polyp     2007; Dr Anabel Elam 5/17    Erectile dysfunction after radical prostatectomy 12/28/2020    Frozen shoulder syndrome 2007    Dr. Peyton Conroy    Hyperlipidemia     Hypertension     IBS (irritable bowel syndrome)     IFG (impaired fasting glucose) 03/2012    Lower urinary tract symptoms (LUTS)     Obesity     peak weight 217 lbs, bmi 32 from 12/14; IF 1/18 start weight 190+    Phymatous rosacea     Prostate cancer ([facility] [address]     T2C [place] 7 Dr Jud Messina 8/15; DVP Dr Dejan Cabrear 10/15    Skin cancer     Dr Americo [facility]; [facility] nose s/p Moh's 2016    Vertigo     Zoster 1980s      Past Surgical History:   Procedure Laterality Date    HX CATARACT REMOVAL Bilateral 2019    Dr Ester Fischer 2007 polyp; Dr Nando Fischer 5/17 polyp    HX CORONARY STENT PLACEMENT  3/12    3/12 prox LAD AJITH    HX HERNIA REPAIR  1988    LEFT IH    HX PROSTATECTOMY  10/2015    DVP Dr Malena Jackson in [address] HX TURP  05/24/2010    Dr. Vasquez Jacob HX TURP  06/18/2010    Dr. [place] Deann Cancino prostate bx neg (7/08); Dr Jud Messina prostate bx [place] 3+4 [place]. GS 3+3 in [place] (8/15)      Family History   Problem Relation Age of Onset    Heart Disease Mother     Lung Disease Father     Dementia Father     Stroke Paternal Grandmother       Social History     Tobacco Use    Smoking status: Never Smoker    Smokeless tobacco: Never Used   Substance Use Topics    Alcohol use: Yes     Alcohol/week: 2.0 - 3.0 standard drinks     Types: 2 - 3 Cans of beer per week     Comment: 2-3 beers daily      No Known Allergies    Prior to Admission medications    Medication Sig Start Date End Date Taking? Authorizing Provider   metoprolol tartrate (LOPRESSOR) 25 mg tablet TAKE 1 TABLET TWICE DAILY 12/22/21  Yes Nel Xavier NP   lisinopriL (PRINIVIL, ZESTRIL) 2.5 mg tablet TAKE 1 TABLET EVERY DAY 11/4/21  Yes Nel Xavier NP   cetirizine (ZYRTEC) 10 mg tablet Take  by mouth. Yes Provider, Historical   atorvastatin (LIPITOR) 40 mg tablet TAKE 1 TABLET EVERY DAY 2/22/21  Yes Nel Xavier NP   sildenafil citrate (VIAGRA) 100 mg tablet Take 1 Tab by mouth as needed for Erectile Dysfunction.  12/28/20  Yes Lilian Araujo MD   nitroglycerin (NITROSTAT) 0.4 mg SL tablet DISSOLVE 1 TAB UNDER THE TONGUE EVERY 5 MINUTES AS NEEDED FOR CHEST PAIN FOR UP TO 3 DOSES AS DIRECTED 2/17/20  Yes Saleem Shafer MD   Cholecalciferol, Vitamin D3, 1,000 unit cap Take  by mouth as needed. Yes Provider, Historical   aspirin 81 mg tablet Take 1 Tab by mouth daily. 3/20/12  Yes Amira Lomax NP       Review of Systems:    14 systems were reviewed. The results are as above in the HPI and otherwise negative. Objective:     Vitals:    01/18/22 1503 01/18/22 1509   BP: (!) 164/81 (!) 164/80   Pulse: 73    Resp: 17    Temp: 98.1 °F (36.7 °C)    TempSrc: Temporal    SpO2: 98%    Weight: 85.7 kg (189 lb)    Height: 5' 9\" (1.753 m)        Physical Exam:  GENERAL: alert, cooperative, no distress, appears stated age,   EYE: conjunctivae/corneas clear. PERRL, EOM's intact. THROAT & NECK: normal and no erythema or exudates noted. ,    LYMPHATIC: Cervical, supraclavicular, and axillary nodes normal. ,   LUNG: clear to auscultation bilaterally,   HEART: regular rate and rhythm, S1, S2 normal, no murmur, click, rub or gallop,   ABDOMEN: soft, non-tender. Bowel sounds normal. No masses,  no organomegaly,   Genitalia: Penis appears normal. No drainage. Both testicles are descended into the scrotum. Bulge in the left groin region with cough. No bulge in the right groin region with cough. EXTREMITIES:  extremities normal, atraumatic, no cyanosis or edema,   SKIN: Normal.,   NEUROLOGIC: AOx3. Cranial nerves 2-12 and sensation grossly intact. ,     Data Review:  to be done    Mr. Nichole Colon has a reminder for a \"due or due soon\" health maintenance. I have asked that he contact his primary care provider for follow-up on this health maintenance. Impression:     · Patient with left inguinal hernia which is symptomatic.     Plan:     · Robot assisted laparoscopic left inguinal hernia repair with mesh  · Consent on chart  · Preoperative orders written    Signed By: Rachael Medellin MD     January 18, 2022

## 2022-01-18 NOTE — H&P (VIEW-ONLY)
[facility]  General Surgery    Subjective:     CC: Left inguinal bulge and pain     HPI: Patient is a very pleasant 78-year-old male with past medical history remarkable for hypertension, hyperlipidemia, coronary artery disease status post stent, skin cancer, prostate cancer status post prostatectomy with erectile dysfunction, frozen shoulder and advanced directive. He returns today for assessment of the left groin pain and occasional bulge. The CAT scan which was performed last year did not reveal evidence of hernia on the left side of the right side. He does have history of prior open right inguinal hernia repair. He states that since our last visit he has had several episodes where the hernia pops through the abdominal wall. He is able to reduce it. It is not presently bulging. He has occasional pain which is not very severe. He would like to have the hernia repair while he is in his off season of golf.     Patient Active Problem List    Diagnosis Date Noted    Erectile dysfunction after radical prostatectomy 12/28/2020    History of prostate cancer 12/27/2019    Overweight (BMI 25.0-29.9) 12/17/2017    History of coronary artery stent placement 10/02/2017    Advance directive discussed with patient 06/17/2016    Essential hypertension 10/05/2015    Coronary artery disease involving native coronary artery without angina pectoris Dr Italia Taemz 10/05/2015    Impaired fasting glucose 03/20/2012    Hyperlipidemia 03/20/2012     Past Medical History:   Diagnosis Date    CAD (coronary artery disease) 4/12    s/p stent LAD Dr. Sheri Aguilar Colon polyp     2007; Dr Bob Griffith 5/17    Erectile dysfunction after radical prostatectomy 12/28/2020    Frozen shoulder syndrome 2007    Dr. Uziel Linder    Hyperlipidemia     Hypertension     IBS (irritable bowel syndrome)     IFG (impaired fasting glucose) 03/2012    Lower urinary tract symptoms (LUTS)     Obesity     peak weight 217 lbs, bmi 32 from 12/14; IF 1/18 start weight 190+    Phymatous rosacea     Prostate cancer ([address]     T2C [place] 7 Dr Delonte Prieto 8/15; DVP Dr Irasema Christiansen 10/15    Skin cancer     Dr Martinez [facility]; [facility] nose s/p Moh's 2016    Vertigo     Zoster 1980s      Past Surgical History:   Procedure Laterality Date    HX CATARACT REMOVAL Bilateral 2019    Dr Sameer Munguia 2007 polyp; Dr Marion Munguia 5/17 polyp    HX CORONARY STENT PLACEMENT  3/12    3/12 prox LAD AJITH    HX HERNIA REPAIR  1988    LEFT IH    HX PROSTATECTOMY  10/2015    DVP Dr Radha Rodriguez in [address] HX TURP  05/24/2010    Dr. Javon Hernandez HX TURP  06/18/2010    Dr. Xena Cortez prostate bx neg (7/08); Dr Delonte Prieto prostate bx [place] 3+4 in [place]. GS 3+3 in [place] (8/15)      Family History   Problem Relation Age of Onset    Heart Disease Mother     Lung Disease Father     Dementia Father     Stroke Paternal Grandmother       Social History     Tobacco Use    Smoking status: Never Smoker    Smokeless tobacco: Never Used   Substance Use Topics    Alcohol use: Yes     Alcohol/week: 2.0 - 3.0 standard drinks     Types: 2 - 3 Cans of beer per week     Comment: 2-3 beers daily      No Known Allergies    Prior to Admission medications    Medication Sig Start Date End Date Taking? Authorizing Provider   metoprolol tartrate (LOPRESSOR) 25 mg tablet TAKE 1 TABLET TWICE DAILY 12/22/21  Yes Nel Xavier NP   lisinopriL (PRINIVIL, ZESTRIL) 2.5 mg tablet TAKE 1 TABLET EVERY DAY 11/4/21  Yes Nel Xavier NP   cetirizine (ZYRTEC) 10 mg tablet Take  by mouth. Yes Provider, Historical   atorvastatin (LIPITOR) 40 mg tablet TAKE 1 TABLET EVERY DAY 2/22/21  Yes Nel Xavier NP   sildenafil citrate (VIAGRA) 100 mg tablet Take 1 Tab by mouth as needed for Erectile Dysfunction.  12/28/20  Yes Geoffrey Paul MD   nitroglycerin (NITROSTAT) 0.4 mg SL tablet DISSOLVE 1 TAB UNDER THE TONGUE EVERY 5 MINUTES AS NEEDED FOR CHEST PAIN FOR UP TO 3 DOSES AS DIRECTED 2/17/20  Yes Soni Hopper MD   Cholecalciferol, Vitamin D3, 1,000 unit cap Take  by mouth as needed. Yes Provider, Historical   aspirin 81 mg tablet Take 1 Tab by mouth daily. 3/20/12  Yes Rcihelle [facility], NP       Review of Systems:    14 systems were reviewed. The results are as above in the HPI and otherwise negative. Objective:     Vitals:    01/18/22 1503 01/18/22 1509   BP: (!) 164/81 (!) 164/80   Pulse: 73    Resp: 17    Temp: 98.1 °F (36.7 °C)    TempSrc: Temporal    SpO2: 98%    Weight: 85.7 kg (189 lb)    Height: 5' 9\" (1.753 m)        Physical Exam:  GENERAL: alert, cooperative, no distress, appears stated age,   EYE: conjunctivae/corneas clear. PERRL, EOM's intact. THROAT & NECK: normal and no erythema or exudates noted. ,    LYMPHATIC: Cervical, supraclavicular, and axillary nodes normal. ,   LUNG: clear to auscultation bilaterally,   HEART: regular rate and rhythm, S1, S2 normal, no murmur, click, rub or gallop,   ABDOMEN: soft, non-tender. Bowel sounds normal. No masses,  no organomegaly,   Genitalia: Penis appears normal. No drainage. Both testicles are descended into the scrotum. Bulge in the left groin region with cough. No bulge in the right groin region with cough. EXTREMITIES:  extremities normal, atraumatic, no cyanosis or edema,   SKIN: Normal.,   NEUROLOGIC: AOx3. Cranial nerves 2-12 and sensation grossly intact. 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 I have asked that he contact his primary care provider for follow-up on this health maintenance. Impression:     · Patient with left inguinal hernia which is symptomatic.     Plan:     · Robot assisted laparoscopic left inguinal hernia repair with mesh  · Consent on chart  · Preoperative orders written    Signed By: Emily Ramon MD     January 18, 2022

## 2022-01-18 NOTE — PATIENT INSTRUCTIONS
Inguinal Hernia Repair: Before Your Surgery  What is inguinal hernia repair? Inguinal hernia repair is a type of surgery. An inguinal hernia is a bulge under the skin in your groin. It happens when there is a weak spot in the groin muscle and a piece of the intestines or tissue pokes through the muscle. This can be painful. You may have pain when you're active. Or it may be painful when you strain during a bowel movement or lift something heavy. Surgery can help with your pain. It can also prevent serious problems that can happen if an organ or tissue gets stuck in the hernia. There are two ways to do this surgery. In open surgery, the doctor makes one cut near the hernia. This cut is called an incision. In laparoscopic surgery, the doctor makes several very small incisions and uses a thin, lighted scope and small tools. During surgery, the doctor pushes the bulge back in place. The doctor may place a piece of mesh on top of the bulge to help keep it in place. Then the healthy tissue is sewn back together. Laparoscopic surgery leaves several small scars. Open surgery leaves one long scar. The scars fade with time. After the surgery, you can probably return to light activity after 1 to 3 weeks. How long it takes will depend on the type of surgery. Follow-up care is a key part of your treatment and safety. Be sure to make and go to all appointments, and call your doctor if you are having problems. It's also a good idea to know your test results and keep a list of the medicines you take. How do you prepare for surgery? Surgery can be stressful. This information will help you understand what you can expect. And it will help you safely prepare for surgery. Preparing for surgery    · Be sure you have someone to take you home.  Anesthesia and pain medicine will make it unsafe for you to drive or get home on your own.     · Understand exactly what surgery is planned, along with the risks, benefits, and other options.     · If you take aspirin or some other blood thinner, ask your doctor if you should stop taking it before your surgery. Make sure that you understand exactly what your doctor wants you to do. These medicines increase the risk of bleeding.     · Tell your doctor ALL the medicines, vitamins, supplements, and herbal remedies you take. Some may increase the risk of problems during your surgery. Your doctor will tell you if you should stop taking any of them before the surgery and how soon to do it.     · Make sure your doctor and the hospital have a copy of your advance directive. If you don't have one, you may want to prepare one. It lets others know your health care wishes. It's a good thing to have before any type of surgery or procedure. What happens on the day of surgery? · Follow the instructions exactly about when to stop eating and drinking. If you don't, your surgery may be canceled. If your doctor told you to take your medicines on the day of surgery, take them with only a sip of water.     · Take a bath or shower before you come in for your surgery. Do not apply lotions, perfumes, deodorants, or nail polish.     · Do not shave the surgical site yourself.     · Take off all jewelry and piercings. And take out contact lenses, if you wear them. At the hospital or surgery center   · Bring a picture ID.     · The area for surgery is often marked to make sure there are no errors.     · You will be kept comfortable and safe by your anesthesia provider. The anesthesia may make you sleep. Or it may just numb the area being worked on.     · The surgery will take about 1 to 2 hours. When should you call your doctor? · You have questions or concerns.     · You don't understand how to prepare for your surgery.     · You become ill before the surgery (such as fever, flu, or a cold).     · You need to reschedule or have changed your mind about having the surgery. Where can you learn more?   Go to http://www.gray.com/  Enter P932 in the search box to learn more about \"Inguinal Hernia Repair: Before Your Surgery. \"  Current as of: February 10, 2021               Content Version: 13.0  © 6230-6161 [employer]. Care instructions adapted under license by [employer] (which disclaims liability or warranty for this information). If you have questions about a medical condition or this instruction, always ask your healthcare professional. [address] any warranty or liability for your use of this information.

## 2022-01-19 ENCOUNTER — HOSPITAL ENCOUNTER (OUTPATIENT)
Dept: PREADMISSION TESTING | Age: 77
Discharge: HOME | End: 2022-01-19
Payer: MEDICARE

## 2022-01-19 ENCOUNTER — TELEPHONE (OUTPATIENT)
Dept: SURGERY | Age: 77
End: 2022-01-19

## 2022-01-19 DIAGNOSIS — K40.90 LEFT INGUINAL HERNIA: ICD-10-CM

## 2022-01-19 DIAGNOSIS — Z01.818 PRE-OP TESTING: ICD-10-CM

## 2022-01-19 DIAGNOSIS — Z01.818 PREOPERATIVE TESTING: ICD-10-CM

## 2022-01-19 DIAGNOSIS — Z01.818 PREOPERATIVE TESTING: Primary | ICD-10-CM

## 2022-01-19 LAB — SARS-COV-2, NAA: NOT DETECTED

## 2022-01-19 PROCEDURE — U0003 INFECTIOUS AGENT DETECTION BY NUCLEIC ACID (DNA OR RNA); SEVERE ACUTE RESPIRATORY SYNDROME CORONAVIRUS 2 (SARS-COV-2) (CORONAVIRUS DISEASE [COVID-19]), AMPLIFIED PROBE TECHNIQUE, MAKING USE OF HIGH THROUGHPUT TECHNOLOGIES AS DESCRIBED BY CMS-2020-01-R: HCPCS

## 2022-01-19 PROCEDURE — 93005 ELECTROCARDIOGRAM TRACING: CPT

## 2022-01-19 RX ORDER — GLUCOSAMINE/CHONDR SU A SOD 750-600 MG
TABLET ORAL DAILY
COMMUNITY

## 2022-01-19 NOTE — PERIOP NOTES
PRE-SURGICAL INSTRUCTIONS        Patient's Name:  Heaven Soriano. Today's Date:  1/19/2022            Covid Testing Date and Time:    Surgery Date:  1/24/2022                1. Do NOT eat or drink anything, including candy, gum, or ice chips after midnight on 1/24, unless you have specific instructions from your surgeon or anesthesia provider to do so.  2. You may brush your teeth before coming to the hospital.  3. No smoking 24 hours prior to the day of surgery. 4. No alcohol 24 hours prior to the day of surgery. 5. No recreational drugs for one week prior to the day of surgery. 6. Leave all valuables, including money/purse, at home. 7. Remove all jewelry, nail polish, acrylic nails, and makeup (including mascara); no lotions powders, deodorant, or perfume/cologne/after shave on the skin. 8. Follow instruction for Hibiclens washes and CHG wipes from surgeon's office. 9. Glasses/contact lenses and dentures may be worn to the hospital.  They will be removed prior to surgery. 10. Call your doctor if symptoms of a cold or illness develop within 24-48 hours prior to your surgery. 11.  If you are having an outpatient procedure, please make arrangements for a responsible ADULT TO [address] [place] [address] and stay with you for 24 hours after your surgery. 12. ONE VISITOR in the hospital at this time for outpatient procedures. Exceptions may be made for surgical admissions, per nursing unit guidelines      Special Instructions:      Bring list of CURRENT medications. Bring any pertinent legal medical records. Take these medications the morning of surgery with a sip of water:  Per office        On the day of surgery, come in the main entrance of [facility]. Let the  at the desk know you are there for surgery. A staff member will come escort you to the surgical area on the second floor.     If you have any questions or concerns, please do not hesitate to call:     (Prior to the day of surgery) [facility] department:  [phone]   (Day of surgery) Pre-Op department:  [phone]    These surgical instructions were reviewed with the patient during the PAT phone call.

## 2022-01-20 LAB
ATRIAL RATE: 70 BPM
CALCULATED P AXIS, ECG09: 67 DEGREES
CALCULATED R AXIS, ECG10: -40 DEGREES
CALCULATED T AXIS, ECG11: 58 DEGREES
DIAGNOSIS, 93000: NORMAL
P-R INTERVAL, ECG05: 142 MS
Q-T INTERVAL, ECG07: 376 MS
QRS DURATION, ECG06: 92 MS
QTC CALCULATION (BEZET), ECG08: 406 MS
VENTRICULAR RATE, ECG03: 70 BPM

## 2022-01-21 ENCOUNTER — ANESTHESIA EVENT (OUTPATIENT)
Dept: SURGERY | Age: 77
End: 2022-01-21
Payer: MEDICARE

## 2022-01-24 ENCOUNTER — HOSPITAL ENCOUNTER (OUTPATIENT)
Age: 77
Discharge: HOME | End: 2022-01-24
Attending: SURGERY | Admitting: SURGERY
Payer: MEDICARE

## 2022-01-24 ENCOUNTER — ANESTHESIA (OUTPATIENT)
Dept: SURGERY | Age: 77
End: 2022-01-24
Payer: MEDICARE

## 2022-01-24 VITALS
WEIGHT: 190 LBS | TEMPERATURE: 97.6 F | RESPIRATION RATE: 20 BRPM | DIASTOLIC BLOOD PRESSURE: 72 MMHG | SYSTOLIC BLOOD PRESSURE: 118 MMHG | HEART RATE: 53 BPM | BODY MASS INDEX: 28.14 KG/M2 | HEIGHT: 69 IN | OXYGEN SATURATION: 97 %

## 2022-01-24 DIAGNOSIS — G89.18 POSTOPERATIVE PAIN: Primary | ICD-10-CM

## 2022-01-24 DIAGNOSIS — K40.90 LEFT INGUINAL HERNIA: ICD-10-CM

## 2022-01-24 PROCEDURE — S2900 ROBOTIC SURGICAL SYSTEM: HCPCS | Performed by: SURGERY

## 2022-01-24 PROCEDURE — 64486 TAP BLOCK UNIL BY INJECTION: CPT | Performed by: ANESTHESIOLOGY

## 2022-01-24 PROCEDURE — 76060000034 HC ANESTHESIA 1.5 TO 2 HR: Performed by: SURGERY

## 2022-01-24 PROCEDURE — 99100 ANES PT EXTEME AGE<1 YR&>70: CPT | Performed by: ANESTHESIOLOGY

## 2022-01-24 PROCEDURE — 74011000250 HC RX REV CODE- 250: Performed by: NURSE ANESTHETIST, CERTIFIED REGISTERED

## 2022-01-24 PROCEDURE — 00840 ANES IPER PX LOWER ABD NOS: CPT | Performed by: NURSE ANESTHETIST, CERTIFIED REGISTERED

## 2022-01-24 PROCEDURE — C1781 MESH (IMPLANTABLE): HCPCS | Performed by: SURGERY

## 2022-01-24 PROCEDURE — 77030013079 HC BLNKT BAIR HGGR 3M -A: Performed by: ANESTHESIOLOGY

## 2022-01-24 PROCEDURE — 77030020703 HC SEAL CANN DISP INTU -B: Performed by: SURGERY

## 2022-01-24 PROCEDURE — 77030018836 HC SOL IRR NACL ICUM -A: Performed by: SURGERY

## 2022-01-24 PROCEDURE — 77030008771 HC TU NG SALEM SUMP -A: Performed by: ANESTHESIOLOGY

## 2022-01-24 PROCEDURE — 77030002933 HC SUT MCRYL J&J -A: Performed by: SURGERY

## 2022-01-24 PROCEDURE — 77030022704 HC SUT VLOC COVD -B: Performed by: SURGERY

## 2022-01-24 PROCEDURE — 77030026438 HC STYL ET INTUB CARD -A: Performed by: ANESTHESIOLOGY

## 2022-01-24 PROCEDURE — 77030016151 HC PROTCTR LNS DFOG COVD -B: Performed by: SURGERY

## 2022-01-24 PROCEDURE — 74011000250 HC RX REV CODE- 250: Performed by: SURGERY

## 2022-01-24 PROCEDURE — 77030033188 HC TBNG FLTRD BIIFUR DISP CNMD -C: Performed by: SURGERY

## 2022-01-24 PROCEDURE — 77030019908 HC STETH ESOPH SIMS -A: Performed by: ANESTHESIOLOGY

## 2022-01-24 PROCEDURE — 74011250636 HC RX REV CODE- 250/636: Performed by: NURSE ANESTHETIST, CERTIFIED REGISTERED

## 2022-01-24 PROCEDURE — 49650 LAP ING HERNIA REPAIR INIT: CPT | Performed by: SURGERY

## 2022-01-24 PROCEDURE — 74011250636 HC RX REV CODE- 250/636: Performed by: SURGERY

## 2022-01-24 PROCEDURE — 77030035277 HC OBTRTR BLDELSS DISP INTU -B: Performed by: SURGERY

## 2022-01-24 PROCEDURE — 77030040361 HC SLV COMPR DVT MDII -B: Performed by: SURGERY

## 2022-01-24 PROCEDURE — 76210000022 HC REC RM PH II 1.5 TO 2 HR: Performed by: SURGERY

## 2022-01-24 PROCEDURE — 77030040922 HC BLNKT HYPOTHRM STRY -A: Performed by: SURGERY

## 2022-01-24 PROCEDURE — 74011250637 HC RX REV CODE- 250/637: Performed by: NURSE ANESTHETIST, CERTIFIED REGISTERED

## 2022-01-24 PROCEDURE — 77030031139 HC SUT VCRL2 J&J -A: Performed by: SURGERY

## 2022-01-24 PROCEDURE — 76010000875 HC OR TIME 1.5 TO 2HR INTENSV - TIER 2: Performed by: SURGERY

## 2022-01-24 PROCEDURE — 74011250636 HC RX REV CODE- 250/636: Performed by: ANESTHESIOLOGY

## 2022-01-24 PROCEDURE — 77030008683 HC TU ET CUF COVD -A: Performed by: ANESTHESIOLOGY

## 2022-01-24 PROCEDURE — 77030040830 HC CATH URETH FOL MDII -A: Performed by: SURGERY

## 2022-01-24 PROCEDURE — 76210000006 HC OR PH I REC 0.5 TO 1 HR: Performed by: SURGERY

## 2022-01-24 PROCEDURE — 77030018673: Performed by: SURGERY

## 2022-01-24 PROCEDURE — 2709999900 HC NON-CHARGEABLE SUPPLY: Performed by: SURGERY

## 2022-01-24 PROCEDURE — 00840 ANES IPER PX LOWER ABD NOS: CPT | Performed by: ANESTHESIOLOGY

## 2022-01-24 PROCEDURE — 74011000272 HC RX REV CODE- 272: Performed by: SURGERY

## 2022-01-24 PROCEDURE — 99100 ANES PT EXTEME AGE<1 YR&>70: CPT | Performed by: NURSE ANESTHETIST, CERTIFIED REGISTERED

## 2022-01-24 DEVICE — 3DMAX MESH, 10.8 CM X 16.0 CM (4.3" X 6.3"), LEFT, LARGE
Type: IMPLANTABLE DEVICE | Site: INGUINAL | Status: FUNCTIONAL
Brand: 3DMAX

## 2022-01-24 RX ORDER — ROCURONIUM BROMIDE 10 MG/ML
INJECTION, SOLUTION INTRAVENOUS AS NEEDED
Status: DISCONTINUED | OUTPATIENT
Start: 2022-01-24 | End: 2022-01-24 | Stop reason: HOSPADM

## 2022-01-24 RX ORDER — SUCCINYLCHOLINE CHLORIDE 20 MG/ML
INJECTION INTRAMUSCULAR; INTRAVENOUS AS NEEDED
Status: DISCONTINUED | OUTPATIENT
Start: 2022-01-24 | End: 2022-01-24 | Stop reason: HOSPADM

## 2022-01-24 RX ORDER — HYDROMORPHONE HYDROCHLORIDE 1 MG/ML
0.5 INJECTION, SOLUTION INTRAMUSCULAR; INTRAVENOUS; SUBCUTANEOUS
Status: DISCONTINUED | OUTPATIENT
Start: 2022-01-24 | End: 2022-01-24 | Stop reason: HOSPADM

## 2022-01-24 RX ORDER — SODIUM CHLORIDE 0.9 % (FLUSH) 0.9 %
5-40 SYRINGE (ML) INJECTION AS NEEDED
Status: DISCONTINUED | OUTPATIENT
Start: 2022-01-24 | End: 2022-01-24 | Stop reason: HOSPADM

## 2022-01-24 RX ORDER — NEOSTIGMINE METHYLSULFATE 1 MG/ML
INJECTION, SOLUTION INTRAVENOUS AS NEEDED
Status: DISCONTINUED | OUTPATIENT
Start: 2022-01-24 | End: 2022-01-24 | Stop reason: HOSPADM

## 2022-01-24 RX ORDER — LIDOCAINE HYDROCHLORIDE 20 MG/ML
INJECTION, SOLUTION EPIDURAL; INFILTRATION; INTRACAUDAL; PERINEURAL AS NEEDED
Status: DISCONTINUED | OUTPATIENT
Start: 2022-01-24 | End: 2022-01-24 | Stop reason: HOSPADM

## 2022-01-24 RX ORDER — OXYCODONE HYDROCHLORIDE 5 MG/1
5 TABLET ORAL
Qty: 20 TABLET | Refills: 0 | Status: SHIPPED | OUTPATIENT
Start: 2022-01-24 | End: 2022-01-27

## 2022-01-24 RX ORDER — DEXAMETHASONE SODIUM PHOSPHATE 4 MG/ML
INJECTION, SOLUTION INTRA-ARTICULAR; INTRALESIONAL; INTRAMUSCULAR; INTRAVENOUS; SOFT TISSUE AS NEEDED
Status: DISCONTINUED | OUTPATIENT
Start: 2022-01-24 | End: 2022-01-24 | Stop reason: HOSPADM

## 2022-01-24 RX ORDER — ACETAMINOPHEN 325 MG/1
650 TABLET ORAL EVERY 6 HOURS
Qty: 56 TABLET | Refills: 1 | Status: SHIPPED | OUTPATIENT
Start: 2022-01-24 | End: 2022-01-24 | Stop reason: SDUPTHER

## 2022-01-24 RX ORDER — ACETAMINOPHEN 325 MG/1
650 TABLET ORAL EVERY 6 HOURS
Qty: 56 TABLET | Refills: 1 | Status: SHIPPED | OUTPATIENT
Start: 2022-01-24 | End: 2022-02-03

## 2022-01-24 RX ORDER — ONDANSETRON 2 MG/ML
4 INJECTION INTRAMUSCULAR; INTRAVENOUS
Status: DISCONTINUED | OUTPATIENT
Start: 2022-01-24 | End: 2022-01-24 | Stop reason: HOSPADM

## 2022-01-24 RX ORDER — SODIUM CHLORIDE, SODIUM LACTATE, POTASSIUM CHLORIDE, CALCIUM CHLORIDE 600; 310; 30; 20 MG/100ML; MG/100ML; MG/100ML; MG/100ML
75 INJECTION, SOLUTION INTRAVENOUS CONTINUOUS
Status: DISCONTINUED | OUTPATIENT
Start: 2022-01-24 | End: 2022-01-24 | Stop reason: HOSPADM

## 2022-01-24 RX ORDER — GLYCOPYRROLATE 0.2 MG/ML
INJECTION INTRAMUSCULAR; INTRAVENOUS AS NEEDED
Status: DISCONTINUED | OUTPATIENT
Start: 2022-01-24 | End: 2022-01-24 | Stop reason: HOSPADM

## 2022-01-24 RX ORDER — ONDANSETRON 2 MG/ML
INJECTION INTRAMUSCULAR; INTRAVENOUS AS NEEDED
Status: DISCONTINUED | OUTPATIENT
Start: 2022-01-24 | End: 2022-01-24 | Stop reason: HOSPADM

## 2022-01-24 RX ORDER — SODIUM CHLORIDE 0.9 % (FLUSH) 0.9 %
5-40 SYRINGE (ML) INJECTION EVERY 8 HOURS
Status: DISCONTINUED | OUTPATIENT
Start: 2022-01-24 | End: 2022-01-24 | Stop reason: HOSPADM

## 2022-01-24 RX ORDER — FENTANYL CITRATE 50 UG/ML
INJECTION, SOLUTION INTRAMUSCULAR; INTRAVENOUS AS NEEDED
Status: DISCONTINUED | OUTPATIENT
Start: 2022-01-24 | End: 2022-01-24 | Stop reason: HOSPADM

## 2022-01-24 RX ORDER — OXYCODONE HYDROCHLORIDE 5 MG/1
5 TABLET ORAL
Qty: 20 TABLET | Refills: 0 | Status: SHIPPED | OUTPATIENT
Start: 2022-01-24 | End: 2022-01-24 | Stop reason: SDUPTHER

## 2022-01-24 RX ORDER — DOCUSATE SODIUM 100 MG/1
100 CAPSULE, LIQUID FILLED ORAL 2 TIMES DAILY
Qty: 60 CAPSULE | Refills: 1 | Status: SHIPPED | OUTPATIENT
Start: 2022-01-24 | End: 2022-04-24

## 2022-01-24 RX ORDER — FAMOTIDINE 20 MG/1
20 TABLET, FILM COATED ORAL ONCE
Status: COMPLETED | OUTPATIENT
Start: 2022-01-24 | End: 2022-01-24

## 2022-01-24 RX ORDER — LIDOCAINE HYDROCHLORIDE 10 MG/ML
0.1 INJECTION, SOLUTION EPIDURAL; INFILTRATION; INTRACAUDAL; PERINEURAL AS NEEDED
Status: DISCONTINUED | OUTPATIENT
Start: 2022-01-24 | End: 2022-01-24 | Stop reason: HOSPADM

## 2022-01-24 RX ORDER — ROPIVACAINE HYDROCHLORIDE 2 MG/ML
INJECTION, SOLUTION EPIDURAL; INFILTRATION; PERINEURAL
Status: COMPLETED | OUTPATIENT
Start: 2022-01-24 | End: 2022-01-24

## 2022-01-24 RX ORDER — DOCUSATE SODIUM 100 MG/1
100 CAPSULE, LIQUID FILLED ORAL 2 TIMES DAILY
Qty: 60 CAPSULE | Refills: 2 | Status: SHIPPED | OUTPATIENT
Start: 2022-01-24 | End: 2022-01-24 | Stop reason: SDUPTHER

## 2022-01-24 RX ORDER — PROPOFOL 10 MG/ML
INJECTION, EMULSION INTRAVENOUS AS NEEDED
Status: DISCONTINUED | OUTPATIENT
Start: 2022-01-24 | End: 2022-01-24 | Stop reason: HOSPADM

## 2022-01-24 RX ORDER — BUPIVACAINE HYDROCHLORIDE 2.5 MG/ML
INJECTION, SOLUTION EPIDURAL; INFILTRATION; INTRACAUDAL AS NEEDED
Status: DISCONTINUED | OUTPATIENT
Start: 2022-01-24 | End: 2022-01-24 | Stop reason: HOSPADM

## 2022-01-24 RX ADMIN — DEXAMETHASONE SODIUM PHOSPHATE 8 MG: 4 INJECTION, SOLUTION INTRAMUSCULAR; INTRAVENOUS at 07:56

## 2022-01-24 RX ADMIN — GLYCOPYRROLATE 0.4 MG: 0.2 INJECTION INTRAMUSCULAR; INTRAVENOUS at 08:55

## 2022-01-24 RX ADMIN — ROCURONIUM BROMIDE 30 MG: 50 INJECTION INTRAVENOUS at 07:45

## 2022-01-24 RX ADMIN — WATER 2 G: 1 INJECTION INTRAMUSCULAR; INTRAVENOUS; SUBCUTANEOUS at 07:59

## 2022-01-24 RX ADMIN — ONDANSETRON 4 MG: 2 INJECTION INTRAMUSCULAR; INTRAVENOUS at 09:03

## 2022-01-24 RX ADMIN — SODIUM CHLORIDE, SODIUM LACTATE, POTASSIUM CHLORIDE, AND CALCIUM CHLORIDE: 600; 310; 30; 20 INJECTION, SOLUTION INTRAVENOUS at 08:14

## 2022-01-24 RX ADMIN — FENTANYL CITRATE 50 MCG: 50 INJECTION, SOLUTION INTRAMUSCULAR; INTRAVENOUS at 07:33

## 2022-01-24 RX ADMIN — SODIUM CHLORIDE, SODIUM LACTATE, POTASSIUM CHLORIDE, AND CALCIUM CHLORIDE 75 ML/HR: 600; 310; 30; 20 INJECTION, SOLUTION INTRAVENOUS at 06:30

## 2022-01-24 RX ADMIN — PROPOFOL 150 MG: 10 INJECTION, EMULSION INTRAVENOUS at 07:35

## 2022-01-24 RX ADMIN — LIDOCAINE HYDROCHLORIDE 80 MG: 20 INJECTION, SOLUTION EPIDURAL; INFILTRATION; INTRACAUDAL; PERINEURAL at 07:35

## 2022-01-24 RX ADMIN — FENTANYL CITRATE 50 MCG: 50 INJECTION, SOLUTION INTRAMUSCULAR; INTRAVENOUS at 07:28

## 2022-01-24 RX ADMIN — FAMOTIDINE 20 MG: 20 TABLET, FILM COATED ORAL at 06:59

## 2022-01-24 RX ADMIN — Medication 3 MG: at 08:55

## 2022-01-24 RX ADMIN — SUCCINYLCHOLINE CHLORIDE 80 MG: 20 INJECTION, SOLUTION INTRAMUSCULAR; INTRAVENOUS at 07:35

## 2022-01-24 RX ADMIN — ROPIVACAINE HYDROCHLORIDE 60 MG: 2 INJECTION, SOLUTION EPIDURAL; INFILTRATION at 08:01

## 2022-01-24 NOTE — DISCHARGE INSTRUCTIONS
Patient Education        Hernia Repair: What to Expect at [address] are likely to have pain for the next few days. You may also feel tired and have less energy than normal. This is common. You should feel better after a few days and will probably feel much better in 7 days. For several weeks you may feel discomfort or pulling in the hernia repair when you move. You may have some bruising near the repair site and on your genitals. This is normal. If you have swelling in the genitals, you may be told to wear well-fitting briefs. [employer] bicycle shorts may also provide good support. This care sheet gives you a general idea about how long it will take for you to recover. But each person recovers at a different pace. Follow the steps below to get better as quickly as possible. How can you care for yourself at home? Activity    · Rest when you feel tired. Getting enough sleep will help you recover.     · Try to walk each day. Start by walking a little more than you did the day before. Bit by bit, increase the amount you walk. Walking boosts blood flow and helps prevent pneumonia and constipation.     · Avoid strenuous activities, such as biking, jogging, weight lifting, or aerobic exercise, until your doctor says it is okay.     · Avoid lifting anything that would make you strain. This may include heavy grocery bags and milk containers, a heavy briefcase or backpack, cat litter or dog food bags, a vacuum , or a child.     · You may drive when you are no longer taking pain medicine and can quickly move your foot from the gas pedal to the brake. You must also be able to sit comfortably for a long period of time, even if you do not plan to go far. You might get caught in traffic.     · Most people are able to return to work within 1 to 2 weeks after surgery.     · You may shower 24 to 48 hours after surgery, if your doctor okays it. Pat the cut (incision) dry.  Do not take a bath for the first 2 weeks, or until your doctor tells you it is okay.     · Your doctor will tell you when you can have sex again. Diet    · You can eat your normal diet. If your stomach is upset, try bland, low-fat foods like plain rice, broiled chicken, toast, and yogurt.     · Drink plenty of fluids (unless your doctor tells you not to).     · You may notice that your bowel movements are not regular right after your surgery. This is common. Avoid constipation and straining with bowel movements. You may want to take a fiber supplement every day. If you have not had a bowel movement after a couple of days, ask your doctor about taking a mild laxative. Medicines    · Your doctor will tell you if and when you can restart your medicines. He or she will also give you instructions about taking any new medicines.     · If you take aspirin or some other blood thinner, ask your doctor if and when to start taking it again. Make sure that you understand exactly what your doctor wants you to do.     · Be safe with medicines. Take pain medicines exactly as directed. ? If the doctor gave you a prescription medicine for pain, take it as prescribed. ? If you are not taking a prescription pain medicine, take an over-the-counter medicine such as acetaminophen (Tylenol), ibuprofen (Advil, Motrin), or naproxen (Aleve). Read and follow all instructions on the label. ? Do not take two or more pain medicines at the same time unless the doctor told you to. Many pain medicines have acetaminophen, which is Tylenol. Too much acetaminophen (Tylenol) can be harmful.     · If your doctor prescribed antibiotics, take them as directed. Do not stop taking them just because you feel better. You need to take the full course of antibiotics.     · If you think your pain medicine is making you sick to your stomach:  ? Take your medicine after meals (unless your doctor has told you not to). ? Ask your doctor for a different pain medicine.    Incision care    · If you have strips of tape on the cut (incision) the doctor made, leave the tape on for a week or until it falls off.     · If you have staples closing the cut, you will need to visit your doctor in 1 to 2 weeks to have them removed.     · Wash the area daily with warm, soapy water and pat it dry. Follow-up care is a key part of your treatment and safety. Be sure to make and go to all appointments, and call your doctor if you are having problems. It's also a good idea to know your test results and keep a list of the medicines you take. When should you call for help? Call 911 anytime you think you may need emergency care. For example, call if:    · You passed out (lost consciousness).     · You are short of breath. Call your doctor now or seek immediate medical care if:    · You have pain that does not get better after you take pain medicine.     · You are sick to your stomach and cannot keep fluids down.     · You have signs of a blood clot in your leg (called a deep vein thrombosis), such as:  ? Pain in your calf, back of the knee, thigh, or groin. ? Redness and swelling in your leg or groin.     · You cannot pass stools or gas.     · Bright red blood has soaked through the bandage over your incision.     · You have loose stitches, or your incision comes open.     · You have signs of infection, such as:  ? Increased pain, swelling, warmth, or redness. ? Red streaks leading from the incision. ? Pus draining from the incision. ? A fever. Watch closely for any changes in your health, and be sure to contact your doctor if you have any problems. Where can you learn more? Go to http://www.gray.com/  Enter R072 in the search box to learn more about \"Hernia Repair: What to Expect at Home. \"  Current as of: February 10, 2021               Content Version: 13.0  © 5188-8264 Healthwise, Incorporated.    Care instructions adapted under license by [employer] (which disclaims liability or warranty for this information). If you have questions about a medical condition or this instruction, always ask your healthcare professional. [place] [address] any warranty or liability for your use of this information. DISCHARGE SUMMARY from Nurse    PATIENT INSTRUCTIONS:    After general anesthesia or intravenous sedation, for 24 hours or while taking prescription Narcotics:  · Limit your activities  · Do not drive and operate hazardous machinery  · Do not make important personal or business decisions  · Do  not drink alcoholic beverages  · If you have not urinated within 8 hours after discharge, please contact your surgeon on call. Report the following to your surgeon:  · Excessive pain, swelling, redness or odor of or around the surgical area  · Temperature over 100.5  · Nausea and vomiting lasting longer than 4 hours or if unable to take medications  · Any signs of decreased circulation or nerve impairment to extremity: change in color, persistent  numbness, tingling, coldness or increase pain  · Any questions      These are general instructions for a healthy lifestyle:    No smoking/ No tobacco products/ Avoid exposure to second hand smoke  Surgeon General's Warning:  Quitting smoking now greatly reduces serious risk to your health. Obesity, smoking, and sedentary lifestyle greatly increases your risk for illness    A healthy diet, regular physical exercise & weight monitoring are important for maintaining a healthy lifestyle    You may be retaining fluid if you have a history of heart failure or if you experience any of the following symptoms:  Weight gain of 3 pounds or more overnight or 5 pounds in a week, increased swelling in our hands or feet or shortness of breath while lying flat in bed. Please call your doctor as soon as you notice any of these symptoms; do not wait until your next office visit.         The discharge information has been reviewed with the patient. The patient verbalized understanding. Discharge medications reviewed with the patient and appropriate educational materials and side effects teaching were provided.   ___________________________________________________________________________________________________________________________________

## 2022-01-24 NOTE — OP NOTES
[facility], [address] [place] [address]                                 OPERATIVE REPORT    PATIENT:    Heaven Soriano. MRN:            [id number]      DATE:  2022  BILLIN  ROOM:        @IOB@  ATTENDING:   Harish Larsen MD  DICTATING:   Harish Larsen MD    PREOPERATIVE DIAGNOSIS : Left inguinal hernia  POSTOPERATIVE DIAGNOSIS:  Indirect left inguinal hernia  PROCEDURES PERFORMED: Robot-assisted laparoscopic left inguinal hernias  repair with mesh. ESTIMATED BLOOD LOSS: 10 mL. FLUIDS GIVEN: 1000 mL. SPECIMENS REMOVED: None. SURGEON: Shlomo Powell MD   ASSISTANT: Tahir Piedra SA  ANESTHESIA: General and local (0.25% Marcaine with epinephrine). FINDINGS: Left indirect inguinal hernia  IMPLANTS: Bard 3D Max large left  OPERATIVE INDICATION: Patient with symptomatic left inguinal hernias  DESCRIPTION OF PROCEDURE: The patient was identified in the holding area   with his wife, where consent for robot-assisted laparoscopic left inguinal   hernia repair with mesh was verified. In the operating room, the patient   was placed under general anesthesia. Crystal catheter was inserted. The   abdomen was prepped and draped in sterile fashion using chlorhexidine   solution and sterile drapes. The time-out was performed to ensure correct   procedure. The local anesthetic was infiltrated into the skin and deep   dermal tissues at each proposed trocar site prior to the incision. The   initial incision was placed above the umbilicus. This was a 9 mm incision   to accommodate the 8 mm trocar and scope. The Visiport system was used to   safely enter the peritoneal cavity. Pneumoperitoneum was obtained using   carbon dioxide gas to 15 mmHg. The trocar in the left mid clavicular line   was then placed safely into the peritoneal cavity. This was an 8 mm robotic   trocar.  The second 8 mm trocar was placed slightly cephalad and in the mid   clavicular line on the right. Safe entry into the peritoneal cavity was   visualized. The patient was placed in Trendelenburg. The robot was then docked and the robotic instruments were safely inserted   into place under direct visualization. The peritoneum was then incised   using electrocautery at the left anterior superior iliac spine. This dissection was carried laterally from the ASIS, medially to the midline. The peritoneum was then reflected downward. The hernia sac was bluntly dissected away from the surruounding tissues. The hernia sac was then dissected away from the vasculature. The Bard 3D Max large left mesh was inserted through the right-sided trocar. The 0- Vicryl and 2 - 0 V-Loc suture were also inserted through this trocar. The SutureCut needle  was then inserted into the right trocar. The mesh was positioned and then sutured with a stitch in Bill's ligament and medial in the upper   center of the mesh for fixation. The suture was inserted and the peritoneum was closed in a running fashion. The mesh set nicely against the abdominal wall. The peritoneal   closure was tension free. The needles were removed from the peritoneal cavity. The trocars were removed under direct   visualization. Pneumoperitoneum was allowed to deflate. A 4-0 Monocryl   suture was used to close the skin at each trocar site. Mastisol, steristrips and bandaids were used for dressings. The patient tolerated the procedure very well. DISPOSITION: He was stable with the Crystal removed, extubated upon transport   to the recovery room.

## 2022-01-24 NOTE — ANESTHESIA POSTPROCEDURE EVALUATION
Procedure(s):  ROBOTIC ASSISTED LAPAROSCOPIC LEFT INGUINAL HERNIA REPAIR WITH MESH/TAP BLOCK.    general    Anesthesia Post Evaluation      Multimodal analgesia: multimodal analgesia used between 6 hours prior to anesthesia start to PACU discharge  Patient location during evaluation: PACU  Patient participation: complete - patient participated  Level of consciousness: awake and alert  Pain management: adequate  Airway patency: patent  Anesthetic complications: no  Cardiovascular status: acceptable  Respiratory status: acceptable  Hydration status: acceptable  Post anesthesia nausea and vomiting:  controlled  Final Post Anesthesia Temperature Assessment:  Normothermia (36.0-37.5 degrees C)      INITIAL Post-op Vital signs:   Vitals Value Taken Time   /57 01/24/22 0950   Temp 36.4 °C (97.6 °F) 01/24/22 0910   Pulse 53 01/24/22 0956   Resp 10 01/24/22 0956   SpO2 96 % 01/24/22 0956

## 2022-01-24 NOTE — INTERVAL H&P NOTE
Update History & Physical    The Patient's History and Physical of January 18, 2022 was reviewed with the patient and I examined the patient. There was no change. The surgical site was confirmed by the patient and me. Plan:  The risk, benefits, expected outcome, and alternative to the recommended procedure have been discussed with the patient. Patient understands and wants to proceed with the procedure.     Electronically signed by Ambar Funk MD on 1/24/2022 at 7:11 AM

## 2022-01-24 NOTE — PROGRESS NOTES
conducted a pre-surgery visit with Albino Laurenrenetta, who is a 77 y. o.,male. The  provided the following Interventions:  Initiated a relationship of care and support. Offered prayer and assurance of continued prayers on patient's behalf. There is no advance directive on file here. Plan:  Chaplains will continue to follow and will provide pastoral care on an as needed/requested basis.  recommends bedside caregivers page  on duty if patient shows signs of acute spiritual or emotional distress.     Reiseñor 3   Board Certified [address] [place] [address]   [phone]

## 2022-01-24 NOTE — ANESTHESIA PROCEDURE NOTES
Peripheral Block    Start time: 1/24/2022 7:40 AM  End time: 1/24/2022 7:51 AM  Performed by: Drake Garza MD  Authorized by: Drake Garza MD       Pre-procedure:    Indications: at surgeon's request and post-op pain management    Preanesthetic Checklist: patient identified, risks and benefits discussed, site marked, timeout performed, anesthesia consent given and patient being monitored    Timeout Time: 07:40 EST          Block Type:   Block Type:  TAP  Laterality:  Left  Monitoring:  Standard ASA monitoring, continuous pulse ox and oxygen  Injection Technique:  Single shot  Procedures: ultrasound guided    Patient Position: supine  Prep: chlorhexidine    Needle Type:  Stimuplex  Needle Gauge:  21 G  Needle Localization:  Ultrasound guidance  Medication Injected:  Ropivacaine (NAROPIN) 2 mg/mL (0.2 %) injection, 60 mg    Assessment:  Number of attempts:  1  Injection Assessment:  Incremental injection every 5 mL, ultrasound image on chart, negative aspiration for blood, local visualized surrounding nerve on ultrasound and low pressure verified by pressure monitor  Patient tolerance:  Patient tolerated the procedure well with no immediate complications

## 2022-01-24 NOTE — ANESTHESIA PREPROCEDURE EVALUATION
Relevant Problems   CARDIOVASCULAR   (+) Coronary artery disease involving native coronary artery without angina pectoris Dr Fanny Frank   (+) Essential hypertension       Anesthetic History   No history of anesthetic complications            Review of Systems / Medical History  Patient summary reviewed and pertinent labs reviewed    Pulmonary                   Neuro/Psych              Cardiovascular    Hypertension          CAD and cardiac stents         GI/Hepatic/Renal                Endo/Other        Morbid obesity     Other Findings            Physical Exam    Airway  Mallampati: II  TM Distance: 4 - 6 cm  Neck ROM: normal range of motion   Mouth opening: Diminished (comment)     Cardiovascular    Rhythm: regular  Rate: normal         Dental    Dentition: Poor dentition     Pulmonary  Breath sounds clear to auscultation               Abdominal  GI exam deferred       Other Findings            Anesthetic Plan    ASA: 3  Anesthesia type: general            Anesthetic plan and risks discussed with: Patient

## 2022-01-24 NOTE — DISCHARGE SUMMARY
[facility]  Jessica Pal MD, [facility]  General Surgery  Discharge Summary     Patient ID:  Daphine Bosworth  [id number]  77 y.o.  1945    Admit Date: 1/24/2022    Discharge Date: 1/24/2022    Admission Diagnoses: Left inguinal hernia [K40.90]    Discharge Diagnoses:    Problem List as of 1/24/2022 Date Reviewed: 1/24/2022          Codes Class Noted - Resolved    Left inguinal hernia ICD-10-CM: K40.90  ICD-9-CM: 550.90  1/24/2022 - Present        Erectile dysfunction after radical prostatectomy ICD-10-CM: N52.31  ICD-9-CM: 607.84  12/28/2020 - Present        History of prostate cancer ICD-10-CM: Z85.46  ICD-9-CM: V10.46  12/27/2019 - Present    Overview Signed 12/27/2019 11:28 AM by Agapito Goodell, MD     S/p DVP Dr Diana Johnston 10/15             Overweight (BMI 25.0-29. 9) ICD-10-CM: E66.3  ICD-9-CM: 278.02  12/17/2017 - Present    Overview Signed 1/8/2018 10:22 AM by Christina More MD     Weight loss has been strongly encouraged by following dietary restrictions and an exercise routine.              History of coronary artery stent placement ICD-10-CM: Z95.5  ICD-9-CM: V45.82  10/2/2017 - Present    Overview Signed 10/2/2017  1:09 PM by Christina More MD     3/12 LAD PCI             Advance directive discussed with patient ICD-10-CM: Z71.89  ICD-9-CM: V65.49  6/17/2016 - Present    Overview Signed 6/20/2016 12:30 PM by Agapito Goodell, MD     Will work on this             Essential hypertension ICD-10-CM: I10  ICD-9-CM: 401.9  10/5/2015 - Present    Overview Addendum 10/2/2017  1:05 PM by Christina More MD     Controlled; home BPs 120s/80s             Coronary artery disease involving native coronary artery without angina pectoris Dr Adair Rounds: I25.10  ICD-9-CM: 414.01  10/5/2015 - Present    Overview Addendum 1/8/2018 10:21 AM by Christina More MD     1/18; 10/17; 10/16 Stable symptoms; no angina             Impaired fasting glucose ICD-10-CM: R73.01  ICD-9-CM: 790.21  3/20/2012 - Present        Hyperlipidemia ICD-10-CM: E78.5  ICD-9-CM: 272.4  3/20/2012 - Present    Overview Addendum 1/8/2018 10:21 AM by Nima Hernandez MD     12/17 YNU23; 12/15 LDL59; 12/14 Low density lipoproteins (LDLs) are at goal, triglycerides are at goal, High density lipoproteins (HDLs) are at goal.               RESOLVED: Prostate ca [facility]) [place] 7 s/p [facility] prostatectomy Dr Marleen Covington 10/15 ICD-10-CM: Corey Span  ICD-9-CM: 185  10/5/2015 - 12/27/2019        RESOLVED: Colon polyp ICD-10-CM: K63.5  ICD-9-CM: 211.3  Unknown - 6/18/2013               Admission Condition: Good    Discharge Condition: Good    Last Procedure: Procedure(s):  ROBOTIC ASSISTED LAPAROSCOPIC LEFT INGUINAL HERNIA REPAIR WITH MESH/TAP BLOCK    Hospital Course:   Normal hospital course for this procedure. Consults: General Surgery    Significant Diagnostic Studies: None    Disposition: home    Patient Instructions:   Current Discharge Medication List      START taking these medications    Details   oxyCODONE IR (ROXICODONE) 5 mg immediate release tablet Take 1 Tablet by mouth every four (4) hours as needed for Pain for up to 3 days. Max Daily Amount: 30 mg.  Qty: 20 Tablet, Refills: 0    Associated Diagnoses: Postoperative pain; Left inguinal hernia      acetaminophen (TYLENOL) 325 mg tablet Take 2 Tablets by mouth every six (6) hours. Indications: pain  Qty: 56 Tablet, Refills: 1      docusate sodium (COLACE) 100 mg capsule Take 1 Capsule by mouth two (2) times a day for 90 days. Qty: 60 Capsule, Refills: 2      bisacodyL 5 mg tab Take 5 mg by mouth daily as needed for PRN Reason (Other) (Constipation). Qty: 3 Tablet, Refills: 0         CONTINUE these medications which have NOT CHANGED    Details   lutein-zeaxanthin 25-5 mg cap Take  by mouth daily.       metoprolol tartrate (LOPRESSOR) 25 mg tablet TAKE 1 TABLET TWICE DAILY  Qty: 180 Tablet, Refills: 2    Associated Diagnoses: Coronary artery disease involving native coronary artery of native heart without angina pectoris      lisinopriL (PRINIVIL, ZESTRIL) 2.5 mg tablet TAKE 1 TABLET EVERY DAY  Qty: 90 Tablet, Refills: 3    Associated Diagnoses: Essential hypertension      atorvastatin (LIPITOR) 40 mg tablet TAKE 1 TABLET EVERY DAY  Qty: 90 Tab, Refills: 3      sildenafil citrate (VIAGRA) 100 mg tablet Take 1 Tab by mouth as needed for Erectile Dysfunction. Qty: 10 Tab, Refills: 11    Associated Diagnoses: Erectile dysfunction after radical prostatectomy      Cholecalciferol, Vitamin D3, 1,000 unit cap Take  by mouth daily. aspirin 81 mg tablet Take 1 Tab by mouth daily. Qty: 90 Tab, Refills: 3         STOP taking these medications       cetirizine (ZYRTEC) 10 mg tablet Comments:   Reason for Stopping:         nitroglycerin (NITROSTAT) 0.4 mg SL tablet Comments:   Reason for Stopping:             Activity: See surgical instructions  Diet: Low fat, Low cholesterol  Wound Care: As directed    Follow-up with Dr. Sundeep Rangel in 2 weeks.   Follow-up tests/labs None    Signed:  Cade Noel MD  1/24/2022  9:15 AM

## 2022-02-03 ENCOUNTER — OFFICE VISIT (OUTPATIENT)
Dept: CARDIOLOGY CLINIC | Age: 77
End: 2022-02-03
Payer: MEDICARE

## 2022-02-03 VITALS
WEIGHT: 192 LBS | HEART RATE: 62 BPM | DIASTOLIC BLOOD PRESSURE: 73 MMHG | SYSTOLIC BLOOD PRESSURE: 139 MMHG | OXYGEN SATURATION: 98 % | HEIGHT: 69 IN | BODY MASS INDEX: 28.44 KG/M2

## 2022-02-03 DIAGNOSIS — I10 ESSENTIAL HYPERTENSION: ICD-10-CM

## 2022-02-03 DIAGNOSIS — I25.10 CORONARY ARTERY DISEASE INVOLVING NATIVE CORONARY ARTERY OF NATIVE HEART WITHOUT ANGINA PECTORIS: Primary | ICD-10-CM

## 2022-02-03 DIAGNOSIS — Z95.5 HISTORY OF CORONARY ARTERY STENT PLACEMENT: ICD-10-CM

## 2022-02-03 DIAGNOSIS — E78.00 PURE HYPERCHOLESTEROLEMIA: ICD-10-CM

## 2022-02-03 PROCEDURE — G8432 DEP SCR NOT DOC, RNG: HCPCS | Performed by: INTERNAL MEDICINE

## 2022-02-03 PROCEDURE — 99214 OFFICE O/P EST MOD 30 MIN: CPT | Performed by: INTERNAL MEDICINE

## 2022-02-03 PROCEDURE — G8536 NO DOC ELDER MAL SCRN: HCPCS | Performed by: INTERNAL MEDICINE

## 2022-02-03 PROCEDURE — 1101F PT FALLS ASSESS-DOCD LE1/YR: CPT | Performed by: INTERNAL MEDICINE

## 2022-02-03 PROCEDURE — G8754 DIAS BP LESS 90: HCPCS | Performed by: INTERNAL MEDICINE

## 2022-02-03 PROCEDURE — G8427 DOCREV CUR MEDS BY ELIG CLIN: HCPCS | Performed by: INTERNAL MEDICINE

## 2022-02-03 PROCEDURE — G8752 SYS BP LESS 140: HCPCS | Performed by: INTERNAL MEDICINE

## 2022-02-03 PROCEDURE — 3017F COLORECTAL CA SCREEN DOC REV: CPT | Performed by: INTERNAL MEDICINE

## 2022-02-03 PROCEDURE — G8419 CALC BMI OUT NRM PARAM NOF/U: HCPCS | Performed by: INTERNAL MEDICINE

## 2022-02-03 RX ORDER — NITROGLYCERIN 0.4 MG/1
0.4 TABLET SUBLINGUAL
COMMUNITY

## 2022-02-03 NOTE — PATIENT INSTRUCTIONS
Medications Discontinued During This Encounter   Medication Reason    acetaminophen (TYLENOL) 325 mg tablet LIST CLEANUP          Learning About the Mediterranean Diet  What is the Mediterranean diet? The Mediterranean diet is a style of eating rather than a diet plan. It features foods eaten in [place], [place], [place] and [place], and other countries along the [facility]. It emphasizes eating foods like fish, fruits, vegetables, beans, high-fiber breads and whole grains, nuts, and olive oil. This style of eating includes limited red meat, cheese, and sweets. Why choose the Mediterranean diet? A Mediterranean-style diet may improve heart health. It contains more fat than other heart-healthy diets. But the fats are mainly from nuts, unsaturated oils (such as fish oils and olive oil), and certain nut or seed oils (such as canola, soybean, or flaxseed oil). These fats may help protect the heart and blood vessels. How can you get started on the Mediterranean diet? Here are some things you can do to switch to a more Mediterranean way of eating. What to eat  · Eat a variety of fruits and vegetables each day, such as grapes, blueberries, tomatoes, broccoli, peppers, figs, olives, spinach, eggplant, beans, lentils, and chickpeas. · Eat a variety of whole-grain foods each day, such as oats, brown rice, and whole wheat bread, pasta, and couscous. · Eat fish at least 2 times a week. Try tuna, salmon, mackerel, lake trout, herring, or sardines. · Eat moderate amounts of low-fat dairy products, such as milk, cheese, or yogurt. · Eat moderate amounts of poultry and eggs. · Choose healthy (unsaturated) fats, such as nuts, olive oil, and certain nut or seed oils like canola, soybean, and flaxseed. · Limit unhealthy (saturated) fats, such as butter, palm oil, and coconut oil. And limit fats found in animal products, such as meat and dairy products made with whole milk.  Try to eat red meat only a few times a month in very small amounts. · Limit sweets and desserts to only a few times a week. This includes sugar-sweetened drinks like soda. The Mediterranean diet may also include red wine with your meal1 glass each day for women and up to 2 glasses a day for men. Tips for eating at home  · Use herbs, spices, garlic, lemon zest, and citrus juice instead of salt to add flavor to foods. · Add avocado slices to your sandwich instead of elmore. · Have fish for lunch or dinner instead of red meat. Brush the fish with olive oil, and broil or grill it. · Sprinkle your salad with seeds or nuts instead of cheese. · Cook with olive or canola oil instead of butter or oils that are high in saturated fat. · Switch from 2% milk or whole milk to 1% or fat-free milk. · Dip raw vegetables in a vinaigrette dressing or hummus instead of dips made from mayonnaise or sour cream.  · Have a piece of fruit for dessert instead of a piece of cake. Try baked apples, or have some dried fruit. Tips for eating out  · Try broiled, grilled, baked, or poached fish instead of having it fried or breaded. · Ask your  to have your meals prepared with olive oil instead of butter. · Order dishes made with marinara sauce or sauces made from olive oil. Avoid sauces made from cream or mayonnaise. · Choose whole-grain breads, whole wheat pasta and pizza crust, brown rice, beans, and lentils. · Cut back on butter or margarine on bread. Instead, you can dip your bread in a small amount of olive oil. · Ask for a side salad or grilled vegetables instead of french fries or chips. Where can you learn more? Go to http://www.[employer].com/  Enter O407 in the search box to learn more about \"Learning About the Mediterranean Diet. \"  Current as of: December 17, 2020               Content Version: 13.0  © 5781-7287 Healthwise, Incorporated.    Care instructions adapted under license by [employer] (which disclaims liability or warranty for this information). If you have questions about a medical condition or this instruction, always ask your healthcare professional. [place] [address] any warranty or liability for your use of this information.

## 2022-02-03 NOTE — PROGRESS NOTES
HISTORY OF PRESENT ILLNESS  Rell Mayorga. is a 77 y.o. male. F/u CAD, old LAD PCI, HTN, HLD    Patient denies significant chest pain, SOB, palpitations, edema, dizziness      Follow-up  Pertinent negatives include no chest pain, no headaches and no shortness of breath. Review of Systems   Constitutional: Negative for chills, fever, malaise/fatigue and weight loss. HENT: Negative for nosebleeds. Eyes: Negative for discharge. Respiratory: Negative for cough, shortness of breath and wheezing. Cardiovascular: Negative for chest pain, palpitations, orthopnea, claudication, leg swelling and PND. Gastrointestinal: Negative for diarrhea, nausea and vomiting. Genitourinary: Negative for dysuria and hematuria. Musculoskeletal: Negative for joint pain. Skin: Negative for rash. Neurological: Negative for dizziness, seizures, loss of consciousness and headaches. Endo/Heme/Allergies: Negative for polydipsia. Does not bruise/bleed easily. Psychiatric/Behavioral: Negative for depression and substance abuse. The patient does not have insomnia.       No Known Allergies    Past Medical History:   Diagnosis Date    CAD (coronary artery disease) 4/12    s/p stent LAD Dr. Grewal Colon Colon polyp     2007; Dr Jim Heredia 5/17    Erectile dysfunction after radical prostatectomy 12/28/2020    Frozen shoulder syndrome 2007    Dr. Ginette Valderrama    Hyperlipidemia     Hypertension     IBS (irritable bowel syndrome)     IFG (impaired fasting glucose) 03/2012    Lower urinary tract symptoms (LUTS)     Obesity     peak weight 217 lbs, bmi 32 from 12/14; IF 1/18 start weight 190+    Phymatous rosacea     Prostate cancer ([facility] [address]     T2C [place] 7 Dr Alfie Robles 8/15; DVP Dr Desma Jeans 10/15    Skin cancer     Dr Therese May; [facility] nose s/p Moh's 2016    Vertigo     Zoster 1980s       Family History   Problem Relation Age of Onset    Heart Disease Mother     Lung Disease Father     Dementia Father     Stroke Paternal Grandmother        Social History     Tobacco Use    Smoking status: Never Smoker    Smokeless tobacco: Never Used   Vaping Use    Vaping Use: Never used   Substance Use Topics    Alcohol use: Yes     Alcohol/week: 2.0 - 3.0 standard drinks     Types: 2 - 3 Cans of beer per week     Comment: 2-3 beers daily    Drug use: No        Current Outpatient Medications   Medication Sig    nitroglycerin (NITROSTAT) 0.4 mg SL tablet 0.4 mg by SubLINGual route every five (5) minutes as needed for Chest Pain. Up to 3 doses.  bisacodyL 5 mg tab Take 5 mg by mouth daily as needed for PRN Reason (Other) (Constipation).  docusate sodium (COLACE) 100 mg capsule Take 1 Capsule by mouth two (2) times a day for 90 days.  lutein-zeaxanthin 25-5 mg cap Take  by mouth daily.  metoprolol tartrate (LOPRESSOR) 25 mg tablet TAKE 1 TABLET TWICE DAILY    lisinopriL (PRINIVIL, ZESTRIL) 2.5 mg tablet TAKE 1 TABLET EVERY DAY    atorvastatin (LIPITOR) 40 mg tablet TAKE 1 TABLET EVERY DAY    sildenafil citrate (VIAGRA) 100 mg tablet Take 1 Tab by mouth as needed for Erectile Dysfunction.  Cholecalciferol, Vitamin D3, 1,000 unit cap Take  by mouth daily.  aspirin 81 mg tablet Take 1 Tab by mouth daily. No current facility-administered medications for this visit. Past Surgical History:   Procedure Laterality Date    HX CATARACT REMOVAL Bilateral 2019    Dr Dustin Randhawa 2007 polyp; Dr Sarthak Randhawa 5/17 polyp    HX CORONARY STENT PLACEMENT  3/12    3/12 prox LAD AJITH    HX HERNIA REPAIR  1988    LEFT IH    HX PROSTATECTOMY  10/2015    DVP Dr José Miguel Blair in [address] HX TURP  05/24/2010    Dr. Rosa Hoover HX TURP  06/18/2010    Dr. Enrique Sheikh prostate bx neg (7/08); Dr Ann Marie Danielson prostate bx [place] 3+4 in [place], [place]. GS 3+3 in R [place], [place] (8/15)       Diagnostic Studies:  No flowsheet data found. 1/18 Nuc Stress  Conclusion:   1. Normal perfusion scan. 2. Normal wall motion and ejection fraction. 3. No evidence of significant fixed or reversible defect suggesting ischemia or myocardial infarction noted from this nuclear study. 4. Low risk scan. Visit Vitals  /73 (BP 1 Location: Left upper arm, BP Patient Position: Sitting, BP Cuff Size: Adult)   Pulse 62   Ht 5' 9\" (1.753 m)   Wt 87.1 kg (192 lb)   SpO2 98%   BMI 28.35 kg/m²       Mr. Iva Morales has a reminder for a \"due or due soon\" health maintenance. I have asked that he contact his primary care provider for follow-up on this health maintenance. Physical Exam  Constitutional:       General: He is not in acute distress. Appearance: He is well-developed. HENT:      Head: Normocephalic and atraumatic. Eyes:      General: No scleral icterus. Right eye: No discharge. Left eye: No discharge. Neck:      Thyroid: No thyromegaly. Vascular: No carotid bruit or JVD. Cardiovascular:      Rate and Rhythm: Normal rate and regular rhythm. Pulses: Intact distal pulses. Heart sounds: Normal heart sounds, S1 normal and S2 normal. No murmur heard. No friction rub. No gallop. Pulmonary:      Effort: Pulmonary effort is normal.      Breath sounds: Normal breath sounds. No wheezing or rales. Abdominal:      Palpations: Abdomen is soft. There is no mass. Tenderness: There is no abdominal tenderness. Musculoskeletal:      Cervical back: Neck supple. Skin:     General: Skin is warm and dry. Findings: No rash. Neurological:      Mental Status: He is alert and oriented to person, place, and time. Psychiatric:         Behavior: Behavior normal.         ASSESSMENT and PLAN    HLD: Results for Marquita Kong (MRN [id number]) as of 2/3/2022 08:52   Ref.  Range 12/20/2021 15:16   Triglyceride Latest Ref Range: <150 MG/DL 94   Cholesterol, total Latest Ref Range: <200 MG/   HDL Cholesterol Latest Ref Range: 40 - 60 MG/DL 76 (H)   CHOL/HDL Ratio Latest Ref Range: 0 - 5.0   2.0   VLDL, calculated Latest Units: MG/DL 18.8   LDL, calculated Latest Ref Range: 0 - 100 MG/DL 58.2         History of coronary artery stent placement: 3/12 prox LAD AJITH    1/21 Patient is doing very well and stable from cardiac standpoint. He has lost significant weight. He has an active lifestyle. Discussed the usual risk factors of cholesterol exercise and weight. Blood pressure mildly elevated but per patient resting pressures at home are in 120s. Check home chart and then make some adjustments. If needed, would likely add low-dose Norvasc. Diagnoses and all orders for this visit:    1. Coronary artery disease involving native coronary artery of native heart without angina pectoris    2. Essential hypertension    3. History of coronary artery stent placement    4. Pure hypercholesterolemia        Pertinent laboratory and test data reviewed and discussed with patient. See patient instructions also for other medical advice given    Medications Discontinued During This Encounter   Medication Reason    acetaminophen (TYLENOL) 325 mg tablet LIST CLEANUP       Follow-up and Dispositions    · Return in about 1 year (around 2/3/2023), or if symptoms worsen or fail to improve, for post test.       2/3/2022 CAD stable. Blood pressure is controlled. Lipids are excellent. Patient has good exercise and dietary habits. Recent hernia surgery from which she has recovered well. Continue same medications. Healthy lifestyle discussed and Mediterranean diet guidelines given.

## 2022-02-03 NOTE — PROGRESS NOTES
1. Have you been to the ER, urgent care clinic since your last visit? Hospitalized since your last visit? No    2. Have you seen or consulted any other health care providers outside of the [address] [place] [address] since your last visit? Include any pap smears or colon screening. No    3. Since your last visit, have you had any of the following symptoms? No    4. Have you had any blood work, X-rays or cardiac testing? Yes Where: PCP office     Requested: NO     In [facility]: YES    5. Where do you normally have your labs drawn? PCP    6. Do you need any refills today?    No

## 2022-02-09 ENCOUNTER — OFFICE VISIT (OUTPATIENT)
Dept: SURGERY | Age: 77
End: 2022-02-09
Payer: MEDICARE

## 2022-02-09 VITALS
TEMPERATURE: 98.4 F | OXYGEN SATURATION: 97 % | HEIGHT: 69 IN | RESPIRATION RATE: 18 BRPM | BODY MASS INDEX: 28.44 KG/M2 | WEIGHT: 192 LBS | DIASTOLIC BLOOD PRESSURE: 78 MMHG | SYSTOLIC BLOOD PRESSURE: 146 MMHG | HEART RATE: 74 BPM

## 2022-02-09 DIAGNOSIS — Z09 POSTOPERATIVE EXAMINATION: Primary | ICD-10-CM

## 2022-02-09 PROCEDURE — 99024 POSTOP FOLLOW-UP VISIT: CPT | Performed by: SURGERY

## 2022-02-09 NOTE — PROGRESS NOTES
[facility]  General Surgery    Name: Lila Quijano. MRN: [id number]   : 1945 Hospital: [facility]   Date: 2022 Admission Date: No admission date for patient encounter. Subjective:  Patient presents today without complaints. He states that he never took pain medication. He initially had some discomfort but nothing to where he thought he needed medication. Objective:  Vitals:    22 1311 22 1317   BP: (!) 148/80 (!) 146/78   Pulse: 74    Resp: 18    Temp: 98.4 °F (36.9 °C)    TempSrc: Temporal    SpO2: 97%    Weight: 87.1 kg (192 lb)    Height: 5' 9\" (1.753 m)        Physical Exam:    General: Awake and alert, oriented x4, no apparent distress   Abdomen: abdomen is soft without tenderness. Incision(s) are C/D/I. No evidence of hernia recurrence with cough. No masses, organomegaly or guarding    Current Medications:  Current Outpatient Medications   Medication Sig Dispense Refill    lutein-zeaxanthin 25-5 mg cap Take  by mouth daily.  metoprolol tartrate (LOPRESSOR) 25 mg tablet TAKE 1 TABLET TWICE DAILY 180 Tablet 2    lisinopriL (PRINIVIL, ZESTRIL) 2.5 mg tablet TAKE 1 TABLET EVERY DAY 90 Tablet 3    atorvastatin (LIPITOR) 40 mg tablet TAKE 1 TABLET EVERY DAY 90 Tab 3    sildenafil citrate (VIAGRA) 100 mg tablet Take 1 Tab by mouth as needed for Erectile Dysfunction. 10 Tab 11    Cholecalciferol, Vitamin D3, 1,000 unit cap Take  by mouth daily.  aspirin 81 mg tablet Take 1 Tab by mouth daily. 90 Tab 3    nitroglycerin (NITROSTAT) 0.4 mg SL tablet 0.4 mg by SubLINGual route every five (5) minutes as needed for Chest Pain. Up to 3 doses. (Patient not taking: Reported on 2022)      docusate sodium (COLACE) 100 mg capsule Take 1 Capsule by mouth two (2) times a day for 90 days. (Patient not taking: Reported on 2022) 60 Capsule 1       Chart and notes reviewed. Data reviewed. I have evaluated and examined the patient. IMPRESSION:   · Patient doing well 2 weeks out from robot-assisted laparoscopic left inguinal hernia repair with mesh.       PLAN:/DISCUSION:   · Lifting restrictions of 50 pounds  · Follow-up as needed        Jag López MD

## 2022-02-09 NOTE — PROGRESS NOTES
Felicia Maxwell. is a 77 y.o. male  Chief Complaint   Patient presents with    Post OP Follow Up     left inguinal hernia 1/24/2022. Mr. Cuha Ahr has been given the following recommendations today due to his elevated BP reading: referred to Alternative/PCP. Pt is follow up with cardiology for elevated blood pressure.

## 2022-02-21 RX ORDER — ATORVASTATIN CALCIUM 40 MG/1
TABLET, FILM COATED ORAL
Qty: 90 TABLET | Refills: 3 | Status: SHIPPED | OUTPATIENT
Start: 2022-02-21

## 2022-03-18 PROBLEM — E66.3 OVERWEIGHT (BMI 25.0-29.9): Status: ACTIVE | Noted: 2017-12-17

## 2022-03-18 PROBLEM — K40.90 LEFT INGUINAL HERNIA: Status: ACTIVE | Noted: 2022-01-24

## 2022-03-19 PROBLEM — Z85.46 HISTORY OF PROSTATE CANCER: Status: ACTIVE | Noted: 2019-12-27

## 2022-03-19 PROBLEM — Z95.5 HISTORY OF CORONARY ARTERY STENT PLACEMENT: Status: ACTIVE | Noted: 2017-10-02

## 2022-03-20 PROBLEM — N52.31 ERECTILE DYSFUNCTION AFTER RADICAL PROSTATECTOMY: Status: ACTIVE | Noted: 2020-12-28

## 2022-08-08 DIAGNOSIS — I25.10 CORONARY ARTERY DISEASE INVOLVING NATIVE CORONARY ARTERY OF NATIVE HEART WITHOUT ANGINA PECTORIS: ICD-10-CM

## 2022-08-08 RX ORDER — METOPROLOL TARTRATE 25 MG/1
TABLET, FILM COATED ORAL
Qty: 180 TABLET | Refills: 2 | Status: SHIPPED | OUTPATIENT
Start: 2022-08-08

## 2022-09-14 DIAGNOSIS — I10 ESSENTIAL HYPERTENSION: ICD-10-CM

## 2022-09-14 RX ORDER — LISINOPRIL 2.5 MG/1
TABLET ORAL
Qty: 90 TABLET | Refills: 3 | Status: SHIPPED | OUTPATIENT
Start: 2022-09-14

## 2022-12-21 NOTE — PROGRESS NOTES
Justus Mcadams. presents today for   Chief Complaint   Patient presents with    Annual Wellness Visit    Labs     12-27-22    Hypertension     Essential    Coronary Artery Disease    Blood sugar problem     IFG    Cholesterol Problem     Pure Hypercholesterolemia      1. \"Have you been to the ER, urgent care clinic since your last visit? Hospitalized since your last visit? \" Yes   01-24-22 @ [facility], Left inguinal hernia     2. \"Have you seen or consulted any other health care providers outside of the [address] [place] [address] since your last visit? \" no     3. For patients aged 39-70: Has the patient had a colonoscopy / FIT/ Cologuard? NA - based on age      If the patient is female:    4. For patients aged 41-77: Has the patient had a mammogram within the past 2 years? NA - based on age or sex  See top three    5. For patients aged 21-65: Has the patient had a pap smear?  NA - based on age or sex

## 2022-12-27 ENCOUNTER — APPOINTMENT (OUTPATIENT)
Dept: INTERNAL MEDICINE CLINIC | Age: 77
End: 2022-12-27

## 2022-12-27 ENCOUNTER — HOSPITAL ENCOUNTER (OUTPATIENT)
Dept: LAB | Age: 77
Discharge: HOME | End: 2022-12-27
Payer: MEDICARE

## 2022-12-27 DIAGNOSIS — E78.00 PURE HYPERCHOLESTEROLEMIA: ICD-10-CM

## 2022-12-27 DIAGNOSIS — R73.01 IMPAIRED FASTING GLUCOSE: ICD-10-CM

## 2022-12-27 LAB
ALBUMIN SERPL-MCNC: 3.9 G/DL (ref 3.4–5)
ALBUMIN/GLOB SERPL: 1.3 {RATIO} (ref 0.8–1.7)
ALP SERPL-CCNC: 104 U/L (ref 45–117)
ALT SERPL-CCNC: 21 U/L (ref 16–61)
ANION GAP SERPL CALC-SCNC: 3 MMOL/L (ref 3–18)
AST SERPL-CCNC: 17 U/L (ref 10–38)
BILIRUB SERPL-MCNC: 0.5 MG/DL (ref 0.2–1)
BUN SERPL-MCNC: 15 MG/DL (ref 7–18)
BUN/CREAT SERPL: 19 (ref 12–20)
CALCIUM SERPL-MCNC: 9.2 MG/DL (ref 8.5–10.1)
CHLORIDE SERPL-SCNC: 108 MMOL/L (ref 100–111)
CHOLEST SERPL-MCNC: 134 MG/DL
CO2 SERPL-SCNC: 29 MMOL/L (ref 21–32)
CREAT SERPL-MCNC: 0.8 MG/DL (ref 0.6–1.3)
ERYTHROCYTE [DISTWIDTH] IN BLOOD BY AUTOMATED COUNT: 13.3 % (ref 11.6–14.5)
EST. AVERAGE GLUCOSE BLD GHB EST-MCNC: 114 MG/DL
GLOBULIN SER CALC-MCNC: 3 G/DL (ref 2–4)
GLUCOSE SERPL-MCNC: 102 MG/DL (ref 74–99)
HBA1C MFR BLD: 5.6 % (ref 4.2–5.6)
HCT VFR BLD AUTO: 43.2 % (ref 36–48)
HDLC SERPL-MCNC: 65 MG/DL (ref 40–60)
HDLC SERPL: 2.1 {RATIO} (ref 0–5)
HGB BLD-MCNC: 14.3 G/DL (ref 13–16)
LDLC SERPL CALC-MCNC: 56 MG/DL (ref 0–100)
LIPID PROFILE,FLP: ABNORMAL
MCH RBC QN AUTO: 32.3 PG (ref 24–34)
MCHC RBC AUTO-ENTMCNC: 33.1 G/DL (ref 31–37)
MCV RBC AUTO: 97.5 FL (ref 78–100)
NRBC # BLD: 0 K/UL (ref 0–0.01)
NRBC BLD-RTO: 0 PER 100 WBC
PLATELET # BLD AUTO: 209 K/UL (ref 135–420)
PMV BLD AUTO: 10.1 FL (ref 9.2–11.8)
POTASSIUM SERPL-SCNC: 4.2 MMOL/L (ref 3.5–5.5)
PROT SERPL-MCNC: 6.9 G/DL (ref 6.4–8.2)
RBC # BLD AUTO: 4.43 M/UL (ref 4.35–5.65)
SODIUM SERPL-SCNC: 140 MMOL/L (ref 136–145)
TRIGL SERPL-MCNC: 65 MG/DL (ref ?–150)
VLDLC SERPL CALC-MCNC: 13 MG/DL
WBC # BLD AUTO: 7 K/UL (ref 4.6–13.2)

## 2022-12-27 PROCEDURE — 85027 COMPLETE CBC AUTOMATED: CPT

## 2022-12-27 PROCEDURE — 80061 LIPID PANEL: CPT

## 2022-12-27 PROCEDURE — 80053 COMPREHEN METABOLIC PANEL: CPT

## 2022-12-27 PROCEDURE — 36415 COLL VENOUS BLD VENIPUNCTURE: CPT

## 2022-12-27 PROCEDURE — 83036 HEMOGLOBIN GLYCOSYLATED A1C: CPT

## 2022-12-28 NOTE — PROGRESS NOTES
This is a subsequent Medicare Annual Wellness Exam     I have reviewed the patient's medical history in detail and updated the computerized patient record. Assessment/Plan   Education and counseling provided:  Are appropriate based on today's review and evaluation  End-of-Life planning (with patient's consent)  Prostate cancer screening tests (PSA, covered annually)  Colorectal cancer screening tests  Cardiovascular screening blood test  Diabetes screening test    1. Medicare annual wellness visit, subsequent  2. Bronchitis  -     azithromycin (ZITHROMAX) 250 mg tablet; Take 2 tablets today, then take 1 tablet daily, Normal, Disp-6 Tablet, R-0  3. Coronary artery disease involving native coronary artery of native heart without angina pectoris  4. Essential hypertension  5. Pure hypercholesterolemia  -     CBC W/O DIFF; Future  -     LIPID PANEL; Future  6. History of prostate cancer  7. Impaired fasting glucose  -     METABOLIC PANEL, COMPREHENSIVE; Future  -     HEMOGLOBIN A1C WITH EAG; Future  8. Overweight (BMI 25.0-29.9)  9. Advanced directives, counseling/discussion  -     ADVANCE CARE PLANNING FIRST 30 MINS       Depression Risk Factor Screening     3 most recent PHQ Screens 1/3/2023   Little interest or pleasure in doing things Not at all   Feeling down, depressed, irritable, or hopeless Not at all   Total Score PHQ 2 0       Alcohol & Drug Abuse Risk Screen    Do you average more than 1 drink per night or more than 7 drinks a week: No    In the past three months have you have had more than 4 drinks containing alcohol on one occasion: No          Functional Ability and Level of Safety    Hearing: Hearing is good. Activities of Daily Living: The home contains: no safety equipment. Patient does total self care      Ambulation: with no difficulty     Fall Risk:  Fall Risk Assessment, last 12 mths 1/3/2023   Able to walk? Yes   Fall in past 12 months? 0   Do you feel unsteady?  0   Are you worried about falling 0      Abuse Screen:  Patient is not abused       Cognitive Screening    Has your family/caregiver stated any concerns about your memory: no     Cognitive Screening: Normal - Clock Drawing Test    Health Maintenance Due     Health Maintenance Due   Topic Date Due    COVID-19 Vaccine (3 - Booster for Padmini Miles series) 05/26/2021    Flu Vaccine (1) 08/01/2022    Depression Screen  12/27/2022       Patient Care Team   Patient Care Team:  Juan Uribe MD as PCP - General (Internal Medicine Physician)  Juan Uribe MD as PCP [facility]  Ranjit Novoa MD (Urology)  Mark Winston MD as Surgeon (General Surgery)    History     Patient Active Problem List   Diagnosis Code    Impaired fasting glucose R73.01    Hyperlipidemia E78.5    Essential hypertension I10    Coronary artery disease involving native coronary artery without angina pectoris Dr Vanessa Roldan I25.10    Advance directive discussed with patient Z71.89    History of coronary artery stent placement Z95.5    Overweight (BMI 25.0-29. 9) E66.3    History of prostate cancer Z85.46    Erectile dysfunction after radical prostatectomy N52.31    Left inguinal hernia K40.90     Past Medical History:   Diagnosis Date    CAD (coronary artery disease) 04/2012    s/p stent LAD Dr. Michael Castrejon polyp     2007; Dr Lorne Dawson 5/17    COVID-19 vaccine dose declined 01/2023    boosters    Erectile dysfunction after radical prostatectomy 12/28/2020    Frozen shoulder syndrome 2007    Dr. Pedro Ku    Hyperlipidemia     Hypertension     IBS (irritable bowel syndrome)     IFG (impaired fasting glucose) 03/2012    Obesity     peak weight 217 lbs, bmi 32 from 12/14; IF 1/18 start weight 190+    Phymatous rosacea     Prostate cancer ([facility] [address]     T2C [place] 7 Dr Rajni Moulton 8/15; DVP Dr Jonita Favre 10/15    Skin cancer     Dr Carly May; [facility] nose s/p Moh's 2016    Vertigo     Zoster 1980s      Past Surgical History:   Procedure Laterality Date    HX CATARACT REMOVAL Bilateral 2019    Dr Shonda Gill 2007 polyp; Dr Melody Gill 5/17 polyp    HX CORONARY STENT PLACEMENT  03/2012    prox LAD AJITH    HX HERNIA REPAIR  1988    [facility]    HX PROSTATECTOMY  10/2015    DVP Dr Jeff Shaffer in [address]      HX TURP  05/24/2010    Dr. Rebecca St TURP  06/18/2010    Dr. Skylar Panchal prostate bx neg (7/08); Dr Akanksha Silva prostate bx [place] 3+4 in L [place], [place]. GS 3+3 in R [place], R [place] (8/15)    LAP,INGUINAL HERNIA REPR,INITIAL Left 01/24/2022     Current Outpatient Medications   Medication Sig Dispense Refill    azithromycin (ZITHROMAX) 250 mg tablet Take 2 tablets today, then take 1 tablet daily 6 Tablet 0    lisinopriL (PRINIVIL, ZESTRIL) 2.5 mg tablet TAKE 1 TABLET EVERY DAY 90 Tablet 3    metoprolol tartrate (LOPRESSOR) 25 mg tablet TAKE 1 TABLET TWICE DAILY 180 Tablet 2    atorvastatin (LIPITOR) 40 mg tablet TAKE 1 TABLET EVERY DAY 90 Tablet 3    nitroglycerin (NITROSTAT) 0.4 mg SL tablet 0.4 mg by SubLINGual route every five (5) minutes as needed for Chest Pain. Up to 3 doses. lutein-zeaxanthin 25-5 mg cap Take  by mouth daily. sildenafil citrate (VIAGRA) 100 mg tablet Take 1 Tab by mouth as needed for Erectile Dysfunction. 10 Tab 11    Cholecalciferol, Vitamin D3, 1,000 unit cap Take  by mouth daily. aspirin 81 mg tablet Take 1 Tab by mouth daily. 80 Tab 3     No Known Allergies    Family History   Problem Relation Age of Onset    Heart Disease Mother     Lung Disease Father     Dementia Father     Stroke Paternal Grandmother      Social History     Tobacco Use    Smoking status: Never    Smokeless tobacco: Never   Substance Use Topics    Alcohol use:  Yes     Alcohol/week: 2.0 - 3.0 standard drinks     Types: 2 - 3 Cans of beer per week     Comment: 2-3 beers daily         Eulalio Swanson MD

## 2022-12-28 NOTE — PROGRESS NOTES
69 y.o. WHITE/NON- male who presents for f/u    Denies any cardiovascular complaints and he sees Dr. Sofie Mcclure yearly now. Walks, plays golf and table tennis, lawn work and no problems to report. 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 Not checking sugars at this time. He underwent DaVinci prostatectomy by Dr Andrew Rivas in [facility] 10/15. Sees [facility] yearly usually in June time frame     No gi complaints    He had uri around thanksgiving which lasted a few weeks. He got better but got recurrent sx about a week ago. Mild cough, no f, ear pain, facial pain, sob, wheezing, gi complaints.   Using otc meds    LAST MEDICARE WELLNESS EXAM: 6/12/15, 6/20/16, 12/22/17, 12/27/18, 12/27/19, 12/28/20, 12/27/21, 1/3/23    IF 1/18    Past Medical History:   Diagnosis Date    CAD (coronary artery disease) 04/2012    s/p stent LAD Dr. Sofie Mcclure    Colon polyp     2007; Dr Natalya Hernandez 5/17    COVID-19 vaccine dose declined 01/2023    boosters    Erectile dysfunction after radical prostatectomy 12/28/2020    Frozen shoulder syndrome 2007    Dr. Annia Hernandez    Hyperlipidemia     Hypertension     IBS (irritable bowel syndrome)     IFG (impaired fasting glucose) 03/2012    Obesity     peak weight 217 lbs, bmi 32 from 12/14; IF 1/18 start weight 190+    Phymatous rosacea     Prostate cancer ([facility] [address]     T2C [place] 7 Dr Familia Blum 8/15; DVP Dr Anderson Watts 10/15    Skin cancer     Dr Eleazar May; [facility] nose s/p Moh's 2016    Vertigo     [place] 1980s     Past Surgical History:   Procedure Laterality Date    HX CATARACT REMOVAL Bilateral 2019    Dr Tha Hernandez 2007 polyp; Dr Natalya Hernandez 5/17 polyp    HX CORONARY STENT PLACEMENT  03/2012    prox LAD AJITH    [address] FRAUSTO PROSTATECTOMY  10/2015    DVP Dr Celsa Steen in [address]      HX TURP  05/24/2010    Dr. Austin BACA  06/18/2010     Chanell Avalos prostate bx neg (7/08); Dr Estefania Madrid prostate bx [place] 3+4 in [place]. [place] 3+3 in [place] (8/15)    LAP,INGUINAL HERNIA REPR,INITIAL Left 01/24/2022     Social History     Socioeconomic History    Marital status:      Spouse name: Not on file    Number of children: 3    Years of education: Not on file    Highest education level: Not on file   Occupational History    Occupation: engr [employer]   Tobacco Use    Smoking status: Never    Smokeless tobacco: Never   Vaping Use    Vaping Use: Never used   Substance and Sexual Activity    Alcohol use: Yes     Alcohol/week: 2.0 - 3.0 standard drinks     Types: 2 - 3 Cans of beer per week     Comment: 2-3 beers daily    Drug use: No    Sexual activity: Not on file   Other Topics Concern    Not on file   Social History Narrative    Not on file     Social Determinants of Health     Financial Resource Strain: Not on file   Food Insecurity: Not on file   Transportation Needs: Not on file   Physical Activity: Not on file   Stress: Not on file   Social Connections: Not on file   Intimate Partner Violence: Not on file   Housing Stability: Not on file     Current Outpatient Medications   Medication Sig    azithromycin (ZITHROMAX) 250 mg tablet Take 2 tablets today, then take 1 tablet daily    lisinopriL (PRINIVIL, ZESTRIL) 2.5 mg tablet TAKE 1 TABLET EVERY DAY    metoprolol tartrate (LOPRESSOR) 25 mg tablet TAKE 1 TABLET TWICE DAILY    atorvastatin (LIPITOR) 40 mg tablet TAKE 1 TABLET EVERY DAY    nitroglycerin (NITROSTAT) 0.4 mg SL tablet 0.4 mg by SubLINGual route every five (5) minutes as needed for Chest Pain. Up to 3 doses. lutein-zeaxanthin 25-5 mg cap Take  by mouth daily. sildenafil citrate (VIAGRA) 100 mg tablet Take 1 Tab by mouth as needed for Erectile Dysfunction. Cholecalciferol, Vitamin D3, 1,000 unit cap Take  by mouth daily. aspirin 81 mg tablet Take 1 Tab by mouth daily.      No current facility-administered medications for this visit. No Known Allergies    REVIEW OF SYSTEMS: colo 5/17 Dr Juana Mckeon, sees Ventura Lyles  Ophtho - no vision change or eye pain  Oral - no mouth pain, tongue or tooth problems  Ears - no hearing loss, ear pain, fullness, no swallowing problems  Cardiac - no CP, PND, orthopnea, edema, palpitations or syncope  Chest - no breast masses  Resp - no wheezing, chronic coughing, dyspnea  GI - no heartburn, nausea, vomiting, change in bowel habits, bleeding, hemorrhoids  Urinary - no dysuria, hematuria, flank pain, urgency, frequency    Visit Vitals  /72   Pulse 69   Temp 98 °F (36.7 °C) (Temporal)   Resp 19   Ht 5' 9\" (1.753 m)   Wt 181 lb (82.1 kg)   SpO2 99%   BMI 26.73 kg/m²   Affect is appropriate. Mood stable  No apparent distress  HEENT --Anicteric sclerae. No thyromegaly, JVD, or bruits. Lungs --Clear to auscultation and percussion, normal percussion. Heart --Regular rate and rhythm, no murmurs, rubs, gallops, or clicks. Abdomen -- Soft and nontender, no hepatosplenomegaly or masses. Extremities -- Without cyanosis, clubbing, edema. 2+ pulses equally and bilaterally.    and rectal deferred to urology    LABS  From 5/10 showed   gluc 94,   cr 0.90, gfr>60,           wbc 5.4, hb 14.7, plt 185  From 6/10 showed   gluc 107, cr 1.00  From 2/12 showed                                    psa 5.5  From 4/13 showed                                      psa 5.7  From 6/13 showed   gluc 112, cr 0.89, gfr 89,  alt 14,           chol 132, tg 64, hdl 46, ldl-c 73, wbc 5.9, hb 14.2, plt 208, ua neg  From 11/13 showed                    hba1c 5.7, chol 120, tg 48, hdl 47, ldl-c 63  From 6/14 showed         hba1c 5.8, chol 130, tg 54, hdl 51, ldl-c 68, wbc 5.8, hb 14.5, plt 177  From 12/14 showed gluc 117, cr 0.80, gfr>60, alt 19  hba1c 5.9, chol 138, tg 66, hdl 52, ldl-c 73  From 6/15 showed   gluc 104, cr 0.87, gfr>60,     hba1c 5.7,       wbc 4.8, hb 14.2, plt 161, psa 6.1  From 9/15 showed   gluc 84, cr 1.01, gfr 76,  alt 34,        wbc 6.1, hb 13.9, plt 199, ua neg pro, inr 1.0 ptt 28, urine cx neg, ast 24  From 12/15 showed gluc 110, cr 0.97, gfr>60,    hba1c 5.6, chol 130, tg 58, hdl 59, ldl-c 59, wbc 6.0, hb 13.9, plt 197, psa<0.1  From 6/16 showed   gluc 113, cr 0.91, gfr>60,     hba1c 5.6   From 12/16 showed                      psa<0.1  From 6/17 showed                      psa<0.1  From 12/17 showed gluc 93,   cr 0.93, gfr>60, alt 30,           chol 127, tg 63, hdl 71, ldl-c 43, wbc 5.3, hb 16.9, plt 181, psa<0.1  From 12/18 showed gluc 96,   cr 0.84, gfr>60, alt 33,           chol 124, tg 50, hdl 63, ldl-c 51, wbc 6.9, hb 13.5, plt 174  From 12/19 showed gluc 112, cr 0.84, gfr>60, alt 45,           chol 153, tg 43, hdl 74, ldl-c 70, wbc 5.6, hb 14.3, plt 186  From 12/20 showed gluc 113, cr 0.93, gfr>60, alt 28,           chol 157, tg 53, hdl 81, ldl-c 65, wbc 5.5, hb 13.9, plt 205  From 12/21 showed gluc 101, cr 0.98, gfr>60, alt 30, hba1c 5.6, chol 153, tg 94, hdl 76, ldl-c 58, wbc 5.6, hb 14.1, plt 191, psa 0.00    Results for orders placed or performed during the hospital encounter of 12/27/22   CBC W/O DIFF   Result Value Ref Range    WBC 7.0 4.6 - 13.2 K/uL    RBC 4.43 4.35 - 5.65 M/uL    HGB 14.3 13.0 - 16.0 g/dL    HCT 43.2 36.0 - 48.0 %    MCV 97.5 78.0 - 100.0 FL    MCH 32.3 24.0 - 34.0 PG    MCHC 33.1 31.0 - 37.0 g/dL    RDW 13.3 11.6 - 14.5 %    PLATELET 034 663 - 155 K/uL    MPV 10.1 9.2 - 11.8 FL    NRBC 0.0 0  WBC    ABSOLUTE NRBC 0.00 0.00 - 4.17 K/uL   METABOLIC PANEL, COMPREHENSIVE   Result Value Ref Range    Sodium 140 136 - 145 mmol/L    Potassium 4.2 3.5 - 5.5 mmol/L    Chloride 108 100 - 111 mmol/L    CO2 29 21 - 32 mmol/L    Anion gap 3 3.0 - 18 mmol/L    Glucose 102 (H) 74 - 99 mg/dL    BUN 15 7.0 - 18 MG/DL    Creatinine 0.80 0.6 - 1.3 MG/DL    BUN/Creatinine ratio 19 12 - 20      eGFR >60 >60 ml/min/1.73m2    Calcium 9.2 8.5 - 10.1 MG/DL    Bilirubin, total 0.5 0.2 - 1.0 MG/DL    ALT (SGPT) 21 16 - 61 U/L    AST (SGOT) 17 10 - 38 U/L    Alk. phosphatase 104 45 - 117 U/L    Protein, total 6.9 6.4 - 8.2 g/dL    Albumin 3.9 3.4 - 5.0 g/dL    Globulin 3.0 2.0 - 4.0 g/dL    A-G Ratio 1.3 0.8 - 1.7     LIPID PANEL   Result Value Ref Range    LIPID PROFILE          Cholesterol, total 134 <200 MG/DL    Triglyceride 65 <150 MG/DL    HDL Cholesterol 65 (H) 40 - 60 MG/DL    LDL, calculated 56 0 - 100 MG/DL    VLDL, calculated 13 MG/DL    CHOL/HDL Ratio 2.1 0 - 5.0     HEMOGLOBIN A1C WITH EAG   Result Value Ref Range    Hemoglobin A1c 5.6 4.2 - 5.6 %    Est. average glucose 114 mg/dL     We reviewed the patient's labs from the last several visits to point out trends in the numbers        Patient Active Problem List   Diagnosis Code    Impaired fasting glucose R73.01    Hyperlipidemia E78.5    Essential hypertension I10    Coronary artery disease involving native coronary artery without angina pectoris Dr Vanessa Roldan I25.10    Advance directive discussed with patient Z71.89    History of coronary artery stent placement Z95.5    Overweight (BMI 25.0-29. 9) E66.3    History of prostate cancer Z85.46    Erectile dysfunction after radical prostatectomy N52.31    Left inguinal hernia K40.90     Assessment and plan:  1. H/O prostate ca. Per [facility]  2. IFG. Lifestyle and dietary measures, wt loss would be ideal  3. HLP. At target on lipitor  4. CHD. F/U Dr. Vanessa Roladn, continue current  5. HTN. Controlled on current regimen. 6.  Overweight. Dietary and lifestyle measures  7. Bronchitis. Tonia Suncook meds, call if no better      RTC 1/24    Above conditions discussed at length and patient vocalized understanding. All questions answered to patient satisfaction          ICD-10-CM ICD-9-CM    1. Bronchitis  J40 490 azithromycin (ZITHROMAX) 250 mg tablet      2. Coronary artery disease involving native coronary artery of native heart without angina pectoris  I25.10 414.01       3.  Essential hypertension  I10 401.9       4. Pure hypercholesterolemia  E78.00 272.0 CBC W/O DIFF      LIPID PANEL      5. History of prostate cancer  Z85.46 V10.46       6. Impaired fasting glucose  X49.88 455.66 METABOLIC PANEL, COMPREHENSIVE      HEMOGLOBIN A1C WITH EAG      7. Overweight (BMI 25.0-29. 9)  E66.3 278.02

## 2023-01-03 ENCOUNTER — OFFICE VISIT (OUTPATIENT)
Dept: INTERNAL MEDICINE CLINIC | Age: 78
End: 2023-01-03
Payer: MEDICARE

## 2023-01-03 VITALS
OXYGEN SATURATION: 99 % | WEIGHT: 181 LBS | HEART RATE: 69 BPM | RESPIRATION RATE: 19 BRPM | TEMPERATURE: 98 F | SYSTOLIC BLOOD PRESSURE: 132 MMHG | HEIGHT: 69 IN | BODY MASS INDEX: 26.81 KG/M2 | DIASTOLIC BLOOD PRESSURE: 72 MMHG

## 2023-01-03 DIAGNOSIS — I10 ESSENTIAL HYPERTENSION: ICD-10-CM

## 2023-01-03 DIAGNOSIS — R73.01 IMPAIRED FASTING GLUCOSE: ICD-10-CM

## 2023-01-03 DIAGNOSIS — Z00.00 MEDICARE ANNUAL WELLNESS VISIT, SUBSEQUENT: Primary | ICD-10-CM

## 2023-01-03 DIAGNOSIS — I25.10 CORONARY ARTERY DISEASE INVOLVING NATIVE CORONARY ARTERY OF NATIVE HEART WITHOUT ANGINA PECTORIS: ICD-10-CM

## 2023-01-03 DIAGNOSIS — E66.3 OVERWEIGHT (BMI 25.0-29.9): ICD-10-CM

## 2023-01-03 DIAGNOSIS — J40 BRONCHITIS: ICD-10-CM

## 2023-01-03 DIAGNOSIS — Z71.89 ADVANCED DIRECTIVES, COUNSELING/DISCUSSION: ICD-10-CM

## 2023-01-03 DIAGNOSIS — E78.00 PURE HYPERCHOLESTEROLEMIA: ICD-10-CM

## 2023-01-03 DIAGNOSIS — Z85.46 HISTORY OF PROSTATE CANCER: ICD-10-CM

## 2023-01-03 PROCEDURE — 99497 ADVNCD CARE PLAN 30 MIN: CPT | Performed by: INTERNAL MEDICINE

## 2023-01-03 PROCEDURE — 1123F ACP DISCUSS/DSCN MKR DOCD: CPT | Performed by: INTERNAL MEDICINE

## 2023-01-03 PROCEDURE — 99214 OFFICE O/P EST MOD 30 MIN: CPT | Performed by: INTERNAL MEDICINE

## 2023-01-03 PROCEDURE — 1101F PT FALLS ASSESS-DOCD LE1/YR: CPT | Performed by: INTERNAL MEDICINE

## 2023-01-03 PROCEDURE — G0463 HOSPITAL OUTPT CLINIC VISIT: HCPCS | Performed by: INTERNAL MEDICINE

## 2023-01-03 PROCEDURE — G0439 PPPS, SUBSEQ VISIT: HCPCS | Performed by: INTERNAL MEDICINE

## 2023-01-03 PROCEDURE — G8536 NO DOC ELDER MAL SCRN: HCPCS | Performed by: INTERNAL MEDICINE

## 2023-01-03 PROCEDURE — G8417 CALC BMI ABV UP PARAM F/U: HCPCS | Performed by: INTERNAL MEDICINE

## 2023-01-03 PROCEDURE — G8427 DOCREV CUR MEDS BY ELIG CLIN: HCPCS | Performed by: INTERNAL MEDICINE

## 2023-01-03 PROCEDURE — G8510 SCR DEP NEG, NO PLAN REQD: HCPCS | Performed by: INTERNAL MEDICINE

## 2023-01-03 PROCEDURE — 3075F SYST BP GE 130 - 139MM HG: CPT | Performed by: INTERNAL MEDICINE

## 2023-01-03 PROCEDURE — 3078F DIAST BP <80 MM HG: CPT | Performed by: INTERNAL MEDICINE

## 2023-01-03 RX ORDER — AZITHROMYCIN 250 MG/1
TABLET, FILM COATED ORAL
Qty: 6 TABLET | Refills: 0 | Status: SHIPPED | OUTPATIENT
Start: 2023-01-03

## 2023-01-03 NOTE — ACP (ADVANCE CARE PLANNING)
Advance Care Planning     Advance Care Planning (ACP) Physician/NP/PA Conversation      Date of Conversation: 1/3/2023  Conducted with: Patient with Decision Making Capacity    Healthcare Decision Maker:     Primary Decision Maker: Loreda Heimlich - Spouse - [phone]  Click here to complete [address] including selection of the Healthcare Decision Maker Relationship (ie \"Primary\")      Today we documented Decision Maker(s) consistent with Legal Next of Kin hierarchy. Care Preferences:    Hospitalization: \"If your health worsens and it becomes clear that your chance of recovery is unlikely, what would be your preference regarding hospitalization? \"  The patient would prefer hospitalization. Ventilation: \"If you were unable to breathe on your own and your chance of recovery was unlikely, what would be your preference about the use of a ventilator (breathing machine) if it was available to you? \"   The patient would desire the use of a ventilator. Resuscitation: \"In the event your heart stopped as a result of an underlying serious health condition, would you want attempts to be made to restart your heart, or would you prefer a natural death? \"   Yes, attempt to resuscitate.     Additional topics discussed: ventilation preferences, hospitalization preferences, and resuscitation preferences    Conversation Outcomes / Follow-Up Plan:   ACP in process - information provided, considering goals and options  Reviewed DNR/DNI and patient elects Full Code (Attempt Resuscitation)     Length of Voluntary ACP Conversation in minutes:  16 minutes    Saida Roper MD

## 2023-01-12 DIAGNOSIS — E78.00 PURE HYPERCHOLESTEROLEMIA: Primary | ICD-10-CM

## 2023-01-12 RX ORDER — ATORVASTATIN CALCIUM 40 MG/1
40 TABLET, FILM COATED ORAL DAILY
Qty: 90 TABLET | Refills: 3 | Status: CANCELLED | OUTPATIENT
Start: 2023-01-12

## 2023-01-12 RX ORDER — ATORVASTATIN CALCIUM 40 MG/1
TABLET, FILM COATED ORAL
Qty: 90 TABLET | Refills: 3 | Status: SHIPPED | OUTPATIENT
Start: 2023-01-12

## 2023-01-12 NOTE — TELEPHONE ENCOUNTER
Refill atorvastatin 40 mg one tab daily # 90 to [facility]

## 2023-01-12 NOTE — TELEPHONE ENCOUNTER
Requested Prescriptions     Pending Prescriptions Disp Refills    atorvastatin (LIPITOR) 40 mg tablet 90 Tablet 3     Sig: Take 1 Tablet by mouth daily.

## 2023-02-03 ENCOUNTER — OFFICE VISIT (OUTPATIENT)
Dept: CARDIOLOGY CLINIC | Age: 78
End: 2023-02-03
Payer: MEDICARE

## 2023-02-03 VITALS
HEIGHT: 69 IN | BODY MASS INDEX: 27.7 KG/M2 | SYSTOLIC BLOOD PRESSURE: 155 MMHG | WEIGHT: 187 LBS | OXYGEN SATURATION: 98 % | DIASTOLIC BLOOD PRESSURE: 71 MMHG | HEART RATE: 60 BPM

## 2023-02-03 DIAGNOSIS — I10 ESSENTIAL HYPERTENSION: ICD-10-CM

## 2023-02-03 DIAGNOSIS — E78.00 PURE HYPERCHOLESTEROLEMIA: ICD-10-CM

## 2023-02-03 DIAGNOSIS — I25.10 CORONARY ARTERY DISEASE INVOLVING NATIVE CORONARY ARTERY OF NATIVE HEART WITHOUT ANGINA PECTORIS: Primary | ICD-10-CM

## 2023-02-03 DIAGNOSIS — Z95.5 HISTORY OF CORONARY ARTERY STENT PLACEMENT: ICD-10-CM

## 2023-02-03 RX ORDER — FACIAL-BODY WIPES
625 EACH TOPICAL DAILY
COMMUNITY

## 2023-02-03 RX ORDER — LISINOPRIL 5 MG/1
5 TABLET ORAL DAILY
Qty: 90 TABLET | Refills: 1 | Status: SHIPPED | OUTPATIENT
Start: 2023-02-03

## 2023-02-03 NOTE — PATIENT INSTRUCTIONS
Medications Discontinued During This Encounter   Medication Reason    azithromycin (ZITHROMAX) 250 mg tablet Therapy Completed    lisinopriL (PRINIVIL, ZESTRIL) 2.5 mg tablet          Learning About the Mediterranean Diet  What is the Mediterranean diet? The Mediterranean diet is a style of eating rather than a diet plan. It features foods eaten in [place], [place], [place] and [place], and other countries along the [facility]. It emphasizes eating foods like fish, fruits, vegetables, beans, high-fiber breads and whole grains, nuts, and olive oil. This style of eating includes limited red meat, cheese, and sweets. Why choose the Mediterranean diet? A Mediterranean-style diet may improve heart health. It contains more fat than other heart-healthy diets. But the fats are mainly from nuts, unsaturated oils (such as fish oils and olive oil), and certain nut or seed oils (such as canola, soybean, or flaxseed oil). These fats may help protect the heart and blood vessels. How can you get started on the Mediterranean diet? Here are some things you can do to switch to a more Mediterranean way of eating. What to eat  Eat a variety of fruits and vegetables each day, such as grapes, blueberries, tomatoes, broccoli, peppers, figs, olives, spinach, eggplant, beans, lentils, and chickpeas. Eat a variety of whole-grain foods each day, such as oats, brown rice, and whole wheat bread, pasta, and couscous. Eat fish at least 2 times a week. Try tuna, salmon, mackerel, lake trout, herring, or sardines. Eat moderate amounts of low-fat dairy products, such as milk, cheese, or yogurt. Eat moderate amounts of poultry and eggs. Choose healthy (unsaturated) fats, such as nuts, olive oil, and certain nut or seed oils like canola, soybean, and flaxseed. Limit unhealthy (saturated) fats, such as butter, palm oil, and coconut oil.  And limit fats found in animal products, such as meat and dairy products made with whole milk. Try to eat red meat only a few times a month in very small amounts. Limit sweets and desserts to only a few times a week. This includes sugar-sweetened drinks like soda. The Mediterranean diet may also include red wine with your meal--1 glass each day for women and up to 2 glasses a day for men. Tips for eating at home  Use herbs, spices, garlic, lemon zest, and citrus juice instead of salt to add flavor to foods. Add avocado slices to your sandwich instead of [place]. Have fish for lunch or dinner instead of red meat. Brush the fish with olive oil, and broil or grill it. Sprinkle your salad with seeds or nuts instead of cheese. Cook with olive or canola oil instead of butter or oils that are high in saturated fat. Switch from 2% milk or whole milk to 1% or fat-free milk. Dip raw vegetables in a vinaigrette dressing or hummus instead of dips made from mayonnaise or sour cream.  Have a piece of fruit for dessert instead of a piece of cake. Try baked apples, or have some dried fruit. Tips for eating out  Try broiled, grilled, baked, or poached fish instead of having it fried or breaded. Ask your  to have your meals prepared with olive oil instead of butter. Order dishes made with marinara sauce or sauces made from olive oil. Avoid sauces made from cream or mayonnaise. Choose whole-grain breads, whole wheat pasta and pizza crust, brown rice, beans, and lentils. Cut back on butter or margarine on bread. Instead, you can dip your bread in a small amount of olive oil. Ask for a side salad or grilled vegetables instead of french fries or chips. Where can you learn more? Go to http://www.[employer].com/  Enter O407 in the search box to learn more about \"Learning About the Mediterranean Diet. \"  Current as of: May 9, 2022               Content Version: 13.4  © 6319-4508 [employer], [employer].    Care instructions adapted under license by [employer] (which disclaims liability or warranty for this information). If you have questions about a medical condition or this instruction, always ask your healthcare professional. [place] [address] any warranty or liability for your use of this information.

## 2023-02-03 NOTE — PROGRESS NOTES
HISTORY OF PRESENT ILLNESS  Tatyana Jiménez is a 69 y.o. male. F/u CAD, old LAD PCI, HTN, HLD    Patient denies significant chest pain, SOB, palpitations, edema, dizziness      Follow-up  Pertinent negatives include no chest pain, no headaches and no shortness of breath. Review of Systems   Constitutional:  Negative for chills, fever, malaise/fatigue and weight loss. HENT:  Negative for nosebleeds. Eyes:  Negative for discharge. Respiratory:  Negative for cough, shortness of breath and wheezing. Cardiovascular:  Negative for chest pain, palpitations, orthopnea, claudication, leg swelling and PND. Gastrointestinal:  Negative for diarrhea, nausea and vomiting. Genitourinary:  Negative for dysuria and hematuria. Musculoskeletal:  Negative for joint pain. Skin:  Negative for rash. Neurological:  Negative for dizziness, seizures, loss of consciousness and headaches. Endo/Heme/Allergies:  Negative for polydipsia. Does not bruise/bleed easily. Psychiatric/Behavioral:  Negative for depression and substance abuse. The patient does not have insomnia.     No Known Allergies    Past Medical History:   Diagnosis Date    CAD (coronary artery disease) 04/2012    s/p stent LAD Dr. Imelda Soto    Colon polyp     2007; Dr Colin Portillo 5/17    COVID-19 vaccine dose declined 01/2023    boosters    Erectile dysfunction after radical prostatectomy 12/28/2020    Frozen shoulder syndrome 2007    Dr. Albarado Iron    Hyperlipidemia     Hypertension     IBS (irritable bowel syndrome)     IFG (impaired fasting glucose) 03/2012    Obesity     peak weight 217 lbs, bmi 32 from 12/14; IF 1/18 start weight 190+    Phymatous rosacea     Prostate cancer ([facility] [address]     T2C [place] 7 Dr Martha Washington 8/15; DVP Dr Rola Hall 10/15    Skin cancer     Dr Hazel May; [facility] nose s/p Moh's 2016    Vertigo     Zoster 1980s       Family History   Problem Relation Age of Onset    Heart Disease Mother     Lung Disease Father     Dementia Father     Stroke Paternal Grandmother        Social History     Tobacco Use    Smoking status: Never    Smokeless tobacco: Never   Vaping Use    Vaping Use: Never used   Substance Use Topics    Alcohol use: Yes     Alcohol/week: 2.0 - 3.0 standard drinks     Types: 2 - 3 Cans of beer per week     Comment: 2-3 beers daily    Drug use: No        Current Outpatient Medications   Medication Sig    docusate calcium (STOOL SOFTENER, DOCUSATE TONY, PO) Take  by mouth.    calcium polycarbophiL (Fiber Laxative, ca polycarbo,) 625 mg tablet Take 625 mg by mouth daily. atorvastatin (LIPITOR) 40 mg tablet TAKE 1 TABLET EVERY DAY    lisinopriL (PRINIVIL, ZESTRIL) 2.5 mg tablet TAKE 1 TABLET EVERY DAY    metoprolol tartrate (LOPRESSOR) 25 mg tablet TAKE 1 TABLET TWICE DAILY    nitroglycerin (NITROSTAT) 0.4 mg SL tablet 0.4 mg by SubLINGual route every five (5) minutes as needed for Chest Pain. Up to 3 doses. lutein-zeaxanthin 25-5 mg cap Take  by mouth daily. sildenafil citrate (VIAGRA) 100 mg tablet Take 1 Tab by mouth as needed for Erectile Dysfunction. Cholecalciferol, Vitamin D3, 1,000 unit cap Take  by mouth daily. aspirin 81 mg tablet Take 1 Tab by mouth daily. No current facility-administered medications for this visit. Past Surgical History:   Procedure Laterality Date    HX CATARACT REMOVAL Bilateral 2019    Dr Abner Olea 2007 polyp; Dr Giselle Olea 5/17 polyp    HX CORONARY STENT PLACEMENT  03/2012    prox LAD AJITH    HX HERNIA REPAIR  1988    [facility]    HX PROSTATECTOMY  10/2015    DVP Dr Bette Gonzales in [address]      HX TURP  05/24/2010    Dr. Randolph Daily TURP  06/18/2010    Dr. Marc Cardoza prostate bx neg (7/08); Dr Prerna Hogue prostate bx [place] 3+4 in [place], L [place]. GS 3+3 in R [place], R [place] (8/15)    LAP,INGUINAL HERNIA REPR,INITIAL Left 01/24/2022       Diagnostic Studies:  No flowsheet data found. 1/18 Nuc Stress  Conclusion:   1.  Normal perfusion scan. 2. Normal wall motion and ejection fraction. 3. No evidence of significant fixed or reversible defect suggesting ischemia or myocardial infarction noted from this nuclear study. 4. Low risk scan. 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 I have asked that he contact his primary care provider for follow-up on this health maintenance. Physical Exam  Constitutional:       General: He is not in acute distress. Appearance: He is well-developed. HENT:      Head: Normocephalic and atraumatic. Eyes:      General: No scleral icterus. Right eye: No discharge. Left eye: No discharge. Neck:      Thyroid: No thyromegaly. Vascular: No carotid bruit or JVD. Cardiovascular:      Rate and Rhythm: Normal rate and regular rhythm. Pulses: Intact distal pulses. Heart sounds: Normal heart sounds, S1 normal and S2 normal. No murmur heard. No friction rub. No gallop. Pulmonary:      Effort: Pulmonary effort is normal.      Breath sounds: Normal breath sounds. No wheezing or rales. Abdominal:      Palpations: Abdomen is soft. There is no mass. Tenderness: There is no abdominal tenderness. Musculoskeletal:      Cervical back: Neck supple. Right lower leg: No edema. Left lower leg: No edema. Skin:     General: Skin is warm and dry. Findings: No rash. Neurological:      Mental Status: He is alert and oriented to person, place, and time.    Psychiatric:         Behavior: Behavior normal.       ASSESSMENT and PLAN    HLD:    Latest Reference Range & Units 12/20/21 15:16 12/27/22 07:45   Triglyceride <150 MG/DL 94 65   Cholesterol, total <200 MG/ 134   HDL Cholesterol 40 - 60 MG/DL 76 (H) 65 (H)   CHOL/HDL Ratio 0 - 5.0   2.0 2.1   LDL, calculated 0 - 100 MG/DL 58.2 56   VLDL, calculated MG/DL 18.8 13   Hemoglobin A1c, (calculated) 4.2 - 5.6 % 5.6 5.6   (H): Data is abnormally high        History of coronary artery stent placement: 3/12 prox LAD AJITH    1/21 Patient is doing very well and stable from cardiac standpoint. He has lost significant weight. He has an active lifestyle. Discussed the usual risk factors of cholesterol exercise and weight. Blood pressure mildly elevated but per patient resting pressures at home are in 120s. Check home chart and then make some adjustments. If needed, would likely add low-dose Norvasc. 2/3/2022 CAD stable. Blood pressure is controlled. Lipids are excellent. Patient has good exercise and dietary habits. Recent hernia surgery from which she has recovered well. Continue same medications. Healthy lifestyle discussed and Mediterranean diet guidelines given. Diagnoses and all orders for this visit:    1. Coronary artery disease involving native coronary artery of native heart without angina pectoris  -     AMB POC EKG ROUTINE W/ 12 LEADS, INTER & REP    2. Essential hypertension  -     lisinopriL (PRINIVIL, ZESTRIL) 5 mg tablet; Take 1 Tablet by mouth daily. 3. Pure hypercholesterolemia    4. History of coronary artery stent placement        Pertinent laboratory and test data reviewed and discussed with patient. See patient instructions also for other medical advice given    Medications Discontinued During This Encounter   Medication Reason    azithromycin (ZITHROMAX) 250 mg tablet Therapy Completed    lisinopriL (PRINIVIL, ZESTRIL) 2.5 mg tablet        Follow-up and Dispositions    Return if symptoms worsen or fail to improve, for BP log x 4-5 days post med changes. 2/3/2023 CAD stable. EKG is without any changes since last year. Blood pressure is elevated and has remained elevated at home also. Increase lisinopril and follow the home chart.   Lipids are controlled well  Healthy habits discussed and Mediterranean diet guidelines reinforced and reprinted. 0

## 2023-02-03 NOTE — PROGRESS NOTES
1. Have you been to the ER, urgent care clinic since your last visit? Hospitalized since your last visit? No    2. Have you seen or consulted any other health care providers outside of the [address] [place] [address] since your last visit? Include any pap smears or colon screening. No     3. Since your last visit, have you had any of the following symptoms?      dizziness. 4.  Have you had any blood work, X-rays or cardiac testing? No     Requested: NO     In [facility]: NO    5. Where do you normally have your labs drawn? PCP    6. Do you need any refills today?    No

## 2023-02-04 DIAGNOSIS — R73.01 IMPAIRED FASTING GLUCOSE: Primary | ICD-10-CM

## 2023-02-05 DIAGNOSIS — E78.00 PURE HYPERCHOLESTEROLEMIA: Primary | ICD-10-CM

## 2023-02-06 DIAGNOSIS — N52.31 ERECTILE DYSFUNCTION AFTER RADICAL PROSTATECTOMY: ICD-10-CM

## 2023-02-06 RX ORDER — SILDENAFIL 100 MG/1
100 TABLET, FILM COATED ORAL AS NEEDED
Qty: 10 TABLET | Refills: 11 | Status: SHIPPED | OUTPATIENT
Start: 2023-02-06

## 2023-02-06 NOTE — TELEPHONE ENCOUNTER
Requested Prescriptions     Pending Prescriptions Disp Refills    sildenafil citrate (VIAGRA) 100 mg tablet 10 Tablet 11     Sig: Take 1 Tablet by mouth as needed for Erectile Dysfunction. Send to [facility]

## 2023-02-07 DIAGNOSIS — E78.00 PURE HYPERCHOLESTEROLEMIA: Primary | ICD-10-CM

## 2023-05-02 RX ORDER — DOCUSATE CALCIUM 240 MG
CAPSULE ORAL
COMMUNITY

## 2023-05-02 RX ORDER — ASPIRIN 81 MG/1
81 TABLET ORAL DAILY
COMMUNITY
Start: 2012-03-20

## 2023-05-10 ENCOUNTER — ANESTHESIA EVENT (OUTPATIENT)
Facility: HOSPITAL | Age: 78
End: 2023-05-10
Payer: MEDICARE

## 2023-05-11 ENCOUNTER — HOSPITAL ENCOUNTER (OUTPATIENT)
Facility: HOSPITAL | Age: 78
Setting detail: OUTPATIENT SURGERY
Discharge: HOME | End: 2023-05-11
Attending: INTERNAL MEDICINE | Admitting: INTERNAL MEDICINE
Payer: MEDICARE

## 2023-05-11 ENCOUNTER — ANESTHESIA (OUTPATIENT)
Facility: HOSPITAL | Age: 78
End: 2023-05-11
Payer: MEDICARE

## 2023-05-11 VITALS
OXYGEN SATURATION: 99 % | WEIGHT: 185 LBS | TEMPERATURE: 98.2 F | BODY MASS INDEX: 27.4 KG/M2 | DIASTOLIC BLOOD PRESSURE: 65 MMHG | SYSTOLIC BLOOD PRESSURE: 150 MMHG | RESPIRATION RATE: 15 BRPM | HEIGHT: 69 IN | HEART RATE: 58 BPM

## 2023-05-11 PROCEDURE — 2580000003 HC RX 258: Performed by: NURSE ANESTHETIST, CERTIFIED REGISTERED

## 2023-05-11 PROCEDURE — 2580000003 HC RX 258: Performed by: ANESTHESIOLOGY

## 2023-05-11 PROCEDURE — 7100000011 HC PHASE II RECOVERY - ADDTL 15 MIN: Performed by: INTERNAL MEDICINE

## 2023-05-11 PROCEDURE — 3600007512: Performed by: INTERNAL MEDICINE

## 2023-05-11 PROCEDURE — 7100000000 HC PACU RECOVERY - FIRST 15 MIN: Performed by: INTERNAL MEDICINE

## 2023-05-11 PROCEDURE — 6360000002 HC RX W HCPCS: Performed by: NURSE ANESTHETIST, CERTIFIED REGISTERED

## 2023-05-11 PROCEDURE — 3700000001 HC ADD 15 MINUTES (ANESTHESIA): Performed by: INTERNAL MEDICINE

## 2023-05-11 PROCEDURE — 3700000000 HC ANESTHESIA ATTENDED CARE: Performed by: INTERNAL MEDICINE

## 2023-05-11 PROCEDURE — 7100000010 HC PHASE II RECOVERY - FIRST 15 MIN: Performed by: INTERNAL MEDICINE

## 2023-05-11 PROCEDURE — 2709999900 HC NON-CHARGEABLE SUPPLY: Performed by: INTERNAL MEDICINE

## 2023-05-11 PROCEDURE — 88305 TISSUE EXAM BY PATHOLOGIST: CPT

## 2023-05-11 PROCEDURE — 3600007502: Performed by: INTERNAL MEDICINE

## 2023-05-11 PROCEDURE — 2500000003 HC RX 250 WO HCPCS: Performed by: NURSE ANESTHETIST, CERTIFIED REGISTERED

## 2023-05-11 RX ORDER — SODIUM CHLORIDE 0.9 % (FLUSH) 0.9 %
5-40 SYRINGE (ML) INJECTION EVERY 12 HOURS SCHEDULED
Status: DISCONTINUED | OUTPATIENT
Start: 2023-05-11 | End: 2023-05-11 | Stop reason: HOSPADM

## 2023-05-11 RX ORDER — ONDANSETRON 2 MG/ML
4 INJECTION INTRAMUSCULAR; INTRAVENOUS
Status: CANCELLED | OUTPATIENT
Start: 2023-05-11 | End: 2023-05-12

## 2023-05-11 RX ORDER — PROPOFOL 10 MG/ML
INJECTION, EMULSION INTRAVENOUS PRN
Status: DISCONTINUED | OUTPATIENT
Start: 2023-05-11 | End: 2023-05-11 | Stop reason: SDUPTHER

## 2023-05-11 RX ORDER — PROCHLORPERAZINE EDISYLATE 5 MG/ML
5 INJECTION INTRAMUSCULAR; INTRAVENOUS
Status: CANCELLED | OUTPATIENT
Start: 2023-05-11 | End: 2023-05-12

## 2023-05-11 RX ORDER — SODIUM CHLORIDE 0.9 % (FLUSH) 0.9 %
5-40 SYRINGE (ML) INJECTION PRN
Status: DISCONTINUED | OUTPATIENT
Start: 2023-05-11 | End: 2023-05-11 | Stop reason: HOSPADM

## 2023-05-11 RX ORDER — LIDOCAINE HYDROCHLORIDE 20 MG/ML
INJECTION, SOLUTION EPIDURAL; INFILTRATION; INTRACAUDAL; PERINEURAL PRN
Status: DISCONTINUED | OUTPATIENT
Start: 2023-05-11 | End: 2023-05-11 | Stop reason: SDUPTHER

## 2023-05-11 RX ORDER — SODIUM CHLORIDE 9 MG/ML
INJECTION, SOLUTION INTRAVENOUS PRN
Status: DISCONTINUED | OUTPATIENT
Start: 2023-05-11 | End: 2023-05-11 | Stop reason: HOSPADM

## 2023-05-11 RX ORDER — SODIUM CHLORIDE, SODIUM LACTATE, POTASSIUM CHLORIDE, CALCIUM CHLORIDE 600; 310; 30; 20 MG/100ML; MG/100ML; MG/100ML; MG/100ML
INJECTION, SOLUTION INTRAVENOUS CONTINUOUS
Status: DISCONTINUED | OUTPATIENT
Start: 2023-05-11 | End: 2023-05-11 | Stop reason: HOSPADM

## 2023-05-11 RX ORDER — SODIUM CHLORIDE, SODIUM LACTATE, POTASSIUM CHLORIDE, CALCIUM CHLORIDE 600; 310; 30; 20 MG/100ML; MG/100ML; MG/100ML; MG/100ML
INJECTION, SOLUTION INTRAVENOUS CONTINUOUS
Status: CANCELLED | OUTPATIENT
Start: 2023-05-11

## 2023-05-11 RX ORDER — SODIUM CHLORIDE, SODIUM LACTATE, POTASSIUM CHLORIDE, CALCIUM CHLORIDE 600; 310; 30; 20 MG/100ML; MG/100ML; MG/100ML; MG/100ML
INJECTION, SOLUTION INTRAVENOUS CONTINUOUS PRN
Status: DISCONTINUED | OUTPATIENT
Start: 2023-05-11 | End: 2023-05-11 | Stop reason: SDUPTHER

## 2023-05-11 RX ADMIN — PROPOFOL 20 MG: 10 INJECTION, EMULSION INTRAVENOUS at 07:39

## 2023-05-11 RX ADMIN — PROPOFOL 100 MG: 10 INJECTION, EMULSION INTRAVENOUS at 07:32

## 2023-05-11 RX ADMIN — PROPOFOL 20 MG: 10 INJECTION, EMULSION INTRAVENOUS at 07:37

## 2023-05-11 RX ADMIN — SODIUM CHLORIDE, POTASSIUM CHLORIDE, SODIUM LACTATE AND CALCIUM CHLORIDE: 600; 310; 30; 20 INJECTION, SOLUTION INTRAVENOUS at 07:07

## 2023-05-11 RX ADMIN — PROPOFOL 20 MG: 10 INJECTION, EMULSION INTRAVENOUS at 07:41

## 2023-05-11 RX ADMIN — PROPOFOL 20 MG: 10 INJECTION, EMULSION INTRAVENOUS at 07:34

## 2023-05-11 RX ADMIN — PROPOFOL 20 MG: 10 INJECTION, EMULSION INTRAVENOUS at 07:33

## 2023-05-11 RX ADMIN — PROPOFOL 20 MG: 10 INJECTION, EMULSION INTRAVENOUS at 07:35

## 2023-05-11 RX ADMIN — SODIUM CHLORIDE, SODIUM LACTATE, POTASSIUM CHLORIDE, AND CALCIUM CHLORIDE: 600; 310; 30; 20 INJECTION, SOLUTION INTRAVENOUS at 07:29

## 2023-05-11 RX ADMIN — LIDOCAINE HYDROCHLORIDE 100 MG: 20 INJECTION, SOLUTION EPIDURAL; INFILTRATION; INTRACAUDAL; PERINEURAL at 07:32

## 2023-05-11 ASSESSMENT — PAIN SCALES - GENERAL
PAINLEVEL_OUTOF10: 0

## 2023-05-11 NOTE — ANESTHESIA POSTPROCEDURE EVALUATION
Department of Anesthesiology  Postprocedure Note    Patient: Rebecca Scott MRN: [id number]  YOB: 1945  Date of evaluation: 5/11/2023      Procedure Summary     Date: 05/11/23 Room / Location: [facility] ENDO 02 / [facility] ENDOSCOPY    Anesthesia Start: 2156 Anesthesia Stop: 5654    Procedure: COLONOSCOPY w/ polypectomy (Abdomen) Diagnosis:       Personal history of colonic polyps      Screening for colorectal cancer      Hypertension, unspecified type      BMI 27.0-27.9,adult      COVID-19 vaccine series completed      (Personal history of colonic polyps [Z86.010])      (Screening for colorectal cancer [Z12.11, Z12.12])      (Hypertension, unspecified type [I10])      (BMI 27.0-27.9,adult [Z68.27])      (COVID-19 vaccine series completed [Z92.29])    Surgeons: Cydney Lance MD Responsible Provider: Magdaleno Downs MD    Anesthesia Type: MAC ASA Status: 3          Anesthesia Type: No value filed.     Beatriz Phase I: Beatriz Score: 10    Beatriz Phase II:        Anesthesia Post Evaluation    Patient location during evaluation: PACU  Patient participation: complete - patient participated  Level of consciousness: awake  Airway patency: patent  Nausea & Vomiting: no nausea  Complications: no  Cardiovascular status: blood pressure returned to baseline  Respiratory status: acceptable  Hydration status: euvolemic

## 2023-05-11 NOTE — DISCHARGE INSTRUCTIONS
Colonoscopy: What to Expect at [address] [place] [address]  After a colonoscopy, you'll stay at the clinic until you wake up. Then you can go home. But you'll need to arrange for a ride. Your doctor will tell you when you can eat and do your other usual activities. Your doctor will talk to you about when you'll need your next colonoscopy. Your doctor can help you decide how often you need to be checked. This will depend on the results of your test and your risk for colorectal cancer. After the test, you may be bloated or have gas pains. You may need to pass gas. If a biopsy was done or a polyp was removed, you may have streaks of blood in your stool (feces) for a few days. Problems such as heavy rectal bleeding may not occur until several weeks after the test. This isn't common. But it can happen after polyps are removed. This care sheet gives you a general idea about how long it will take for you to recover. But each person recovers at a different pace. Follow the steps below to get better as quickly as possible. How can you care for yourself at home? Activity    Rest when you feel tired. You can do your normal activities when it feels okay to do so. Diet    Follow your doctor's directions for eating. Unless your doctor has told you not to, drink plenty of fluids. This helps to replace the fluids that were lost during the colon prep. Do not drink alcohol. Medicines    Your doctor will tell you if and when you can restart your medicines. You will also be given instructions about taking any new medicines. If you take aspirin or some other blood thinner, ask your doctor if and when to start taking it again. Make sure that you understand exactly what your doctor wants you to do. If polyps were removed or a biopsy was done during the test, your doctor may tell you not to take aspirin or other anti-inflammatory medicines for a few days. These include ibuprofen (Advil, Motrin) and naproxen (Aleve).

## 2023-05-11 NOTE — H&P
Alcohol/week: 1.0 standard drink     Types: 1 Drinks containing 0.5 oz of alcohol per week     Comment: night bourbon    Drug use: Never     Surgical H:   Past Surgical History:   Procedure Laterality Date    CATARACT REMOVAL Bilateral 2019    Dr Chacho Quesada 2007 polyp; Dr Ramírez Quesada 5/17 polyp    CORONARY ANGIOPLASTY WITH STENT PLACEMENT  03/2012    prox LAD JANA    HERNIA REPAIR  1988    [address] Left 01/24/2022    PROSTATECTOMY  10/2015    DVP Dr Sachin Ramsey in [address] [place] [address]      TURP  06/18/2010    Dr. Luis Fernando Stewart    TURP  05/24/2010    Dr. Kiesha Stewart prostate bx neg (7/08); Dr Casandra Councilman prostate bx [place] 3+4 in [place], [place]. GS 3+3 in R [place], R [place] (8/15)       ROS: negative    Physical Exam: Ht 5' 9\" (1.753 m)   Wt 185 lb (83.9 kg)   BMI 27.32 kg/m²   General appearance: alert, no distress  Eyes: pupils equal and reactive, extraocular eye movements intact  Nodes: No gross adenopathy in neck. Skin: no spider angiomata, jaundice, palmar erythema   Respiratory: clear to auscultation bilaterally  Cardiovascular: regular heart rate, no murmurs, no JVD, normal rate and regular rhythm  Abdomen: soft, non-tender, liver not enlarged, spleen not palpable, no obvious ascites  Extremities: no muscle wasting, no gross arthritic changes  Neurologic: alert and oriented, cranial nerves grossly intact, no asterixis    Labs: No results found for this or any previous visit (from the past 24 hour(s)). Imp/ Plan: Will proceed with colonoscopy as planned. Risk benefits alternative including but not limited to infection, bleeding, perforation of viscous, allergic reaction and resultant morbidity and mortality was discussed. Chance of missing a significant lesion due to various reasons were discussed.       Annabel Waldrop MD  [facility], [address]

## 2023-05-11 NOTE — BRIEF OP NOTE
[address] [facility]  [facility], [address]      Brief Procedure Note    Andrew Love  1945  [id number]    Date of Procedure: 5/11/2023     Preoperative diagnosis: Personal history of colonic polyps [Z86.010]  Screening for colorectal cancer [Z12.11, Z12.12]  Hypertension, unspecified type [I10]  BMI 27.0-27.9,adult [Z68.27]  COVID-19 vaccine series completed [Z92.29]    Postoperative diagnosis: Personal history of colonic polyps [Z86.010]  Screening for colorectal cancer [Z12.11, Z12.12]  Hypertension, unspecified type [I10]  BMI 27.0-27.9,adult [Z68.27]  COVID-19 vaccine series completed [Z92.29]    Type of Anesthesia: MAC (Monitored anesthesia care)    Description of findings: same as post op dx    Procedure: Procedure(s):  COLONOSCOPY w/ polypectomy    :  Dr. Ji Negrete MD    Assistant(s): Circulator: Erica Abrams RN  Endoscopy Technician: Noam Kagn    Devices/implants/grafts/tissues/prosthesis: None    EBL:None    Specimens:   ID Type Source Tests Collected by Time Destination   A : Ascending polyp Tissue [facility] Ji Negrete MD 5/11/2023 0741        Findings: See printed and scanned procedure note    Complications: None    Dr. Ji Negrete MD  5/11/2023  8:52 AM

## 2023-05-11 NOTE — ANESTHESIA PRE PROCEDURE
Advance directive discussed with patient Z70.80    Overweight (BMI 25.0-29. 9) E66.3    Left inguinal hernia K40.90    Essential hypertension I10    Coronary artery disease involving native coronary artery without angina pectoris I25.10    Impaired fasting glucose R73.01    History of prostate cancer Z85.46    Hyperlipidemia E78.5    History of coronary artery stent placement Z95.5    Erectile dysfunction after radical prostatectomy N52.31       Past Medical History:        Diagnosis Date    CAD (coronary artery disease) 04/2012    s/p stent LAD Dr. Chino Thakkar Colon polyp     2007; Dr Abraham Smalls 5/17    COVID-19 vaccine dose declined 01/2023    boosters    Erectile dysfunction after radical prostatectomy 12/28/2020    Frozen shoulder syndrome 2007    Dr. Ashwini Pierre Hyperlipidemia     Hypertension     IBS (irritable bowel syndrome)     IFG (impaired fasting glucose) 03/2012    Obesity     peak weight 217 lbs, bmi 32 from 12/14; IF 1/18 start weight 190+    Phymatous rosacea     Prostate cancer ([facility] [address]     T2C [place] 7 Dr Candelaria Phoenix 8/15; DVP Dr Skylar Talley 10/15    Skin cancer     Dr Green [facility]; [facility] nose s/p Moh's 2016    Vertigo     Zoster 1980s       Past Surgical History:        Procedure Laterality Date    CATARACT REMOVAL Bilateral 2019    Dr Peyton Smalls 2007 polyp; Dr Abraham Smalls 5/17 polyp    CORONARY ANGIOPLASTY WITH STENT PLACEMENT  03/2012    prox LAD JANA    HERNIA REPAIR  1988    [facility] [address] Left 01/24/2022    PROSTATECTOMY  10/2015    DVP Dr Anabell Fernandez in [address] [facility] TURP  06/18/2010    [facility]. [facility] [place] TURP  05/24/2010    Dr. Amaury Gonzales prostate bx neg (7/08); Dr Candelaria Phoenix prostate bx [place] 3+4 in L [place], L [place].  [place] 3+3 in R [place], R [place] (8/15)       Social History:    Social History     Tobacco Use    Smoking status: Never    Smokeless tobacco: Never   Substance Use Topics    Alcohol

## 2023-07-03 DIAGNOSIS — I10 ESSENTIAL (PRIMARY) HYPERTENSION: Primary | ICD-10-CM

## 2023-07-03 RX ORDER — LISINOPRIL 5 MG/1
TABLET ORAL
Qty: 90 TABLET | Refills: 3 | Status: SHIPPED | OUTPATIENT
Start: 2023-07-03

## 2023-07-03 RX ORDER — LISINOPRIL 5 MG/1
5 TABLET ORAL DAILY
COMMUNITY

## 2023-07-03 NOTE — TELEPHONE ENCOUNTER
Spoke to patient and he states you increased medication to 5 mg daily. Patient would like 90 supply sent to [facility].  Med rec updated

## 2023-07-03 NOTE — TELEPHONE ENCOUNTER
Requested Prescriptions     Pending Prescriptions Disp Refills    lisinopril (PRINIVIL;ZESTRIL) 5 MG tablet [Pharmacy Med Name: LISINOPRIL 5 MG Tablet] 90 tablet 3     Sig: TAKE 1 TABLET EVERY DAY

## 2023-11-13 RX ORDER — ATORVASTATIN CALCIUM 40 MG/1
TABLET, FILM COATED ORAL
Qty: 90 TABLET | Refills: 3 | Status: SHIPPED | OUTPATIENT
Start: 2023-11-13

## 2024-01-02 ENCOUNTER — HOSPITAL ENCOUNTER (OUTPATIENT)
Facility: HOSPITAL | Age: 79
Setting detail: SPECIMEN
Discharge: HOME | End: 2024-01-05
Payer: MEDICARE

## 2024-01-02 DIAGNOSIS — C61 PROSTATE CANCER (HCC): ICD-10-CM

## 2024-01-02 DIAGNOSIS — R73.01 IMPAIRED FASTING GLUCOSE: ICD-10-CM

## 2024-01-02 DIAGNOSIS — E78.00 PURE HYPERCHOLESTEROLEMIA: ICD-10-CM

## 2024-01-02 LAB
ALBUMIN SERPL-MCNC: 4 G/DL (ref 3.4–5)
ALBUMIN/GLOB SERPL: 1.5 (ref 0.8–1.7)
ALP SERPL-CCNC: 86 U/L (ref 45–117)
ALT SERPL-CCNC: 39 U/L (ref 16–61)
ANION GAP SERPL CALC-SCNC: 3 MMOL/L (ref 3–18)
AST SERPL-CCNC: 25 U/L (ref 10–38)
BILIRUB SERPL-MCNC: 0.9 MG/DL (ref 0.2–1)
BUN SERPL-MCNC: 14 MG/DL (ref 7–18)
BUN/CREAT SERPL: 16 (ref 12–20)
CALCIUM SERPL-MCNC: 9.7 MG/DL (ref 8.5–10.1)
CHLORIDE SERPL-SCNC: 106 MMOL/L (ref 100–111)
CHOLEST SERPL-MCNC: 109 MG/DL
CO2 SERPL-SCNC: 31 MMOL/L (ref 21–32)
CREAT SERPL-MCNC: 0.88 MG/DL (ref 0.6–1.3)
ERYTHROCYTE [DISTWIDTH] IN BLOOD BY AUTOMATED COUNT: 12.5 % (ref 11.6–14.5)
GLOBULIN SER CALC-MCNC: 2.6 G/DL (ref 2–4)
GLUCOSE SERPL-MCNC: 103 MG/DL (ref 74–99)
HCT VFR BLD AUTO: 43.1 % (ref 36–48)
HDLC SERPL-MCNC: 56 MG/DL (ref 40–60)
HDLC SERPL: 1.9 (ref 0–5)
HGB BLD-MCNC: 14 G/DL (ref 13–16)
LDLC SERPL CALC-MCNC: 45.8 MG/DL (ref 0–100)
LIPID PANEL: NORMAL
MCH RBC QN AUTO: 31.6 PG (ref 24–34)
MCHC RBC AUTO-ENTMCNC: 32.5 G/DL (ref 31–37)
MCV RBC AUTO: 97.3 FL (ref 78–100)
NRBC # BLD: 0 K/UL (ref 0–0.01)
NRBC BLD-RTO: 0 PER 100 WBC
PLATELET # BLD AUTO: 245 K/UL (ref 135–420)
PMV BLD AUTO: 9.9 FL (ref 9.2–11.8)
POTASSIUM SERPL-SCNC: 4.3 MMOL/L (ref 3.5–5.5)
PROT SERPL-MCNC: 6.6 G/DL (ref 6.4–8.2)
RBC # BLD AUTO: 4.43 M/UL (ref 4.35–5.65)
SODIUM SERPL-SCNC: 140 MMOL/L (ref 136–145)
TRIGL SERPL-MCNC: 36 MG/DL
VLDLC SERPL CALC-MCNC: 7.2 MG/DL
WBC # BLD AUTO: 8 K/UL (ref 4.6–13.2)

## 2024-01-02 PROCEDURE — 36415 COLL VENOUS BLD VENIPUNCTURE: CPT

## 2024-01-02 PROCEDURE — 80061 LIPID PANEL: CPT

## 2024-01-02 PROCEDURE — 84153 ASSAY OF PSA TOTAL: CPT

## 2024-01-02 PROCEDURE — 80053 COMPREHEN METABOLIC PANEL: CPT

## 2024-01-02 PROCEDURE — 85027 COMPLETE CBC AUTOMATED: CPT

## 2024-01-03 LAB — PSA SERPL-MCNC: 0 NG/ML (ref 0–4)

## 2024-01-09 ENCOUNTER — OFFICE VISIT (OUTPATIENT)
Age: 79
End: 2024-01-09
Payer: MEDICARE

## 2024-01-09 VITALS
BODY MASS INDEX: 26.36 KG/M2 | HEIGHT: 69 IN | DIASTOLIC BLOOD PRESSURE: 80 MMHG | OXYGEN SATURATION: 99 % | RESPIRATION RATE: 16 BRPM | HEART RATE: 62 BPM | WEIGHT: 178 LBS | SYSTOLIC BLOOD PRESSURE: 149 MMHG | TEMPERATURE: 97.2 F

## 2024-01-09 DIAGNOSIS — I25.10 CORONARY ARTERY DISEASE INVOLVING NATIVE CORONARY ARTERY OF NATIVE HEART WITHOUT ANGINA PECTORIS: ICD-10-CM

## 2024-01-09 DIAGNOSIS — R73.01 IMPAIRED FASTING GLUCOSE: ICD-10-CM

## 2024-01-09 DIAGNOSIS — Z00.00 MEDICARE ANNUAL WELLNESS VISIT, SUBSEQUENT: Primary | ICD-10-CM

## 2024-01-09 DIAGNOSIS — E78.5 HYPERLIPIDEMIA, UNSPECIFIED HYPERLIPIDEMIA TYPE: ICD-10-CM

## 2024-01-09 DIAGNOSIS — D12.6 COLON ADENOMA: ICD-10-CM

## 2024-01-09 DIAGNOSIS — Z85.46 HISTORY OF PROSTATE CANCER: ICD-10-CM

## 2024-01-09 DIAGNOSIS — I10 ESSENTIAL HYPERTENSION: ICD-10-CM

## 2024-01-09 DIAGNOSIS — Z71.89 ADVANCED CARE PLANNING/COUNSELING DISCUSSION: ICD-10-CM

## 2024-01-09 PROBLEM — C61 MALIGNANT NEOPLASM OF PROSTATE (HCC): Status: ACTIVE | Noted: 2024-01-09

## 2024-01-09 PROBLEM — K40.90 LEFT INGUINAL HERNIA: Status: RESOLVED | Noted: 2022-01-24 | Resolved: 2024-01-09

## 2024-01-09 PROCEDURE — 99214 OFFICE O/P EST MOD 30 MIN: CPT | Performed by: INTERNAL MEDICINE

## 2024-01-09 PROCEDURE — 3078F DIAST BP <80 MM HG: CPT | Performed by: INTERNAL MEDICINE

## 2024-01-09 PROCEDURE — G8484 FLU IMMUNIZE NO ADMIN: HCPCS | Performed by: INTERNAL MEDICINE

## 2024-01-09 PROCEDURE — 1036F TOBACCO NON-USER: CPT | Performed by: INTERNAL MEDICINE

## 2024-01-09 PROCEDURE — G8419 CALC BMI OUT NRM PARAM NOF/U: HCPCS | Performed by: INTERNAL MEDICINE

## 2024-01-09 PROCEDURE — 1123F ACP DISCUSS/DSCN MKR DOCD: CPT | Performed by: INTERNAL MEDICINE

## 2024-01-09 PROCEDURE — 99497 ADVNCD CARE PLAN 30 MIN: CPT | Performed by: INTERNAL MEDICINE

## 2024-01-09 PROCEDURE — G8427 DOCREV CUR MEDS BY ELIG CLIN: HCPCS | Performed by: INTERNAL MEDICINE

## 2024-01-09 PROCEDURE — G0439 PPPS, SUBSEQ VISIT: HCPCS | Performed by: INTERNAL MEDICINE

## 2024-01-09 PROCEDURE — 3077F SYST BP >= 140 MM HG: CPT | Performed by: INTERNAL MEDICINE

## 2024-01-09 SDOH — ECONOMIC STABILITY: INCOME INSECURITY: HOW HARD IS IT FOR YOU TO PAY FOR THE VERY BASICS LIKE FOOD, HOUSING, MEDICAL CARE, AND HEATING?: NOT HARD AT ALL

## 2024-01-09 SDOH — ECONOMIC STABILITY: FOOD INSECURITY: WITHIN THE PAST 12 MONTHS, THE FOOD YOU BOUGHT JUST DIDN'T LAST AND YOU DIDN'T HAVE MONEY TO GET MORE.: NEVER TRUE

## 2024-01-09 SDOH — ECONOMIC STABILITY: FOOD INSECURITY: WITHIN THE PAST 12 MONTHS, YOU WORRIED THAT YOUR FOOD WOULD RUN OUT BEFORE YOU GOT MONEY TO BUY MORE.: NEVER TRUE

## 2024-01-09 SDOH — ECONOMIC STABILITY: HOUSING INSECURITY
IN THE LAST 12 MONTHS, WAS THERE A TIME WHEN YOU DID NOT HAVE A STEADY PLACE TO SLEEP OR SLEPT IN A SHELTER (INCLUDING NOW)?: NO

## 2024-01-09 ASSESSMENT — LIFESTYLE VARIABLES
HOW OFTEN DURING THE LAST YEAR HAVE YOU NEEDED AN ALCOHOLIC DRINK FIRST THING IN THE MORNING TO GET YOURSELF GOING AFTER A NIGHT OF HEAVY DRINKING: 0
HOW MANY STANDARD DRINKS CONTAINING ALCOHOL DO YOU HAVE ON A TYPICAL DAY: 1 OR 2
HAVE YOU OR SOMEONE ELSE BEEN INJURED AS A RESULT OF YOUR DRINKING: 0
HOW OFTEN DURING THE LAST YEAR HAVE YOU FOUND THAT YOU WERE NOT ABLE TO STOP DRINKING ONCE YOU HAD STARTED: 0
HAS A RELATIVE, FRIEND, DOCTOR, OR ANOTHER HEALTH PROFESSIONAL EXPRESSED CONCERN ABOUT YOUR DRINKING OR SUGGESTED YOU CUT DOWN: 0
HOW OFTEN DO YOU HAVE A DRINK CONTAINING ALCOHOL: 4 OR MORE TIMES A WEEK
HOW OFTEN DURING THE LAST YEAR HAVE YOU FAILED TO DO WHAT WAS NORMALLY EXPECTED FROM YOU BECAUSE OF DRINKING: 0
HOW OFTEN DURING THE LAST YEAR HAVE YOU HAD A FEELING OF GUILT OR REMORSE AFTER DRINKING: 0
HOW OFTEN DURING THE LAST YEAR HAVE YOU BEEN UNABLE TO REMEMBER WHAT HAPPENED THE NIGHT BEFORE BECAUSE YOU HAD BEEN DRINKING: 0

## 2024-01-09 ASSESSMENT — PATIENT HEALTH QUESTIONNAIRE - PHQ9
SUM OF ALL RESPONSES TO PHQ QUESTIONS 1-9: 0
SUM OF ALL RESPONSES TO PHQ9 QUESTIONS 1 & 2: 0
2. FEELING DOWN, DEPRESSED OR HOPELESS: 0
SUM OF ALL RESPONSES TO PHQ QUESTIONS 1-9: 0
1. LITTLE INTEREST OR PLEASURE IN DOING THINGS: 0
SUM OF ALL RESPONSES TO PHQ QUESTIONS 1-9: 0
SUM OF ALL RESPONSES TO PHQ QUESTIONS 1-9: 0

## 2024-01-09 NOTE — ACP (ADVANCE CARE PLANNING)
Advance Care Planning     Advance Care Planning (ACP) Physician/NP/PA Conversation    Date of Conversation: 1/9/2024  Conducted with: Patient with Decision Making Capacity    Healthcare Decision Maker:      Primary Decision Maker: Cookie Villalobos - Spouse - [phone]    Click here to complete Healthcare Decision Makers including selection of the Healthcare Decision Maker Relationship (ie \"Primary\")  Today we documented Decision Maker(s) consistent with Legal Next of Kin hierarchy.    Care Preferences:    Hospitalization:  \"If your health worsens and it becomes clear that your chance of recovery is unlikely, what would be your preference regarding hospitalization?\"  The patient would prefer hospitalization.    Ventilation:  \"If you were unable to breath on your own and your chance of recovery was unlikely, what would be your preference about the use of a ventilator (breathing machine) if it was available to you?\"  The patient would desire the use of a ventilator.    Resuscitation:  \"In the event your heart stopped as a result of an underlying serious health condition, would you want attempts made to restart your heart, or would you prefer a natural death?\"  Yes, attempt to resuscitate.    ventilation preferences, hospitalization preferences, and resuscitation preferences    Conversation Outcomes / Follow-Up Plan:  ACP in process - information provided, considering goals and options  Reviewed DNR/DNI and patient elects Full Code (Attempt Resuscitation)    Length of Voluntary ACP Conversation in minutes:  16 minutes    CAROL BURR MD

## 2024-01-09 NOTE — PATIENT INSTRUCTIONS
sweating, drying off with a towel, or swimming.  Always wear a seat belt when traveling in a car. Always wear a helmet when riding a bicycle or motorcycle.

## 2024-01-09 NOTE — PROGRESS NOTES
Medicare Annual Wellness Visit    Luis Villalobos Jr. is here for Medicare AWV    Assessment & Plan   Essential hypertension  -     CBC with Auto Differential; Future  Coronary artery disease involving native coronary artery of native heart without angina pectoris  Impaired fasting glucose  -     CBC with Auto Differential; Future  -     Comprehensive Metabolic Panel; Future  -     HEMOGLOBIN A1C W/O EAG; Future  Hyperlipidemia, unspecified hyperlipidemia type  -     Lipid Panel; Future  Advanced care planning/counseling discussion  History of prostate cancer  -     PSA, Diagnostic; Future  Medicare annual wellness visit, subsequent    Recommendations for Preventive Services Due: see orders and patient instructions/AVS.  Recommended screening schedule for the next 5-10 years is provided to the patient in written form: see Patient Instructions/AVS.     Return for Medicare Annual Wellness Visit in 1 year.     Subjective       Patient's complete Health Risk Assessment and screening values have been reviewed and are found in Flowsheets. The following problems were reviewed today and where indicated follow up appointments were made and/or referrals ordered.    Positive Risk Factor Screenings with Interventions:    Fall Risk:  Do you feel unsteady or are you worried about falling? : (!) yes  2 or more falls in past year?: no  Fall with injury in past year?: no     Interventions:    Patient declines any further evaluation or treatment                 Safety:  Do you have any tripping hazards - loose or unsecured carpets or rugs?: (!) Yes  Do you have non-slip mats or non-slip surfaces or shower bars or grab bars in your shower or bathtub?: (!) No  Interventions:  Patient declined any further interventions or treatment     Advanced Directives:  Do you have a Living Will?: (!) No    Intervention:                       Objective   Vitals:    01/09/24 0800 01/09/24 0812   BP: (!) 151/75 (!) 149/80   Site: Left Upper Arm 
Luis Vilallobos Jr. presents today for   Chief Complaint   Patient presents with    Medicare AWV         1. \"Have you been to the ER, urgent care clinic since your last visit?  Hospitalized since your last visit?\" No    2. \"Have you seen or consulted any other health care providers outside of the [facility] since your last visit?\" No     3. For patients aged 45-75: Has the patient had a colonoscopy / FIT/ Cologuard? NA - based on age      If the patient is female:    4. For patients aged 40-74: Has the patient had a mammogram within the past 2 years? NA - based on age or sex      5. For patients aged 21-65: Has the patient had a pap smear? NA - based on age or sex    
Ratio 16 12 - 20      Est, Glom Filt Rate >60 >60 ml/min/1.73m2    Calcium 9.7 8.5 - 10.1 MG/DL    Total Bilirubin 0.9 0.2 - 1.0 MG/DL    ALT 39 16 - 61 U/L    AST 25 10 - 38 U/L    Alk Phosphatase 86 45 - 117 U/L    Total Protein 6.6 6.4 - 8.2 g/dL    Albumin 4.0 3.4 - 5.0 g/dL    Globulin 2.6 2.0 - 4.0 g/dL    Albumin/Globulin Ratio 1.5 0.8 - 1.7     CBC   Result Value Ref Range    WBC 8.0 4.6 - 13.2 K/uL    RBC 4.43 4.35 - 5.65 M/uL    Hemoglobin 14.0 13.0 - 16.0 g/dL    Hematocrit 43.1 36.0 - 48.0 %    MCV 97.3 78.0 - 100.0 FL    MCH 31.6 24.0 - 34.0 PG    MCHC 32.5 31.0 - 37.0 g/dL    RDW 12.5 11.6 - 14.5 %    Platelets 245 135 - 420 K/uL    MPV 9.9 9.2 - 11.8 FL    Nucleated RBCs 0.0 0  WBC    nRBC 0.00 0.00 - 0.01 K/uL   Lipid Panel   Result Value Ref Range    LIPID PANEL        Cholesterol, Total 109 <200 MG/DL    Triglycerides 36 <150 MG/DL    HDL 56 40 - 60 MG/DL    LDL Calculated 45.8 0 - 100 MG/DL    VLDL Cholesterol Calculated 7.2 MG/DL    Chol/HDL Ratio 1.9 0 - 5.0     PSA, Diagnostic   Result Value Ref Range    PSA 0.0 0.0 - 4.0 ng/mL       We reviewed the patient's labs from the last several visits to point out trends in the numbers      Patient Active Problem List   Diagnosis    Advance directive discussed with patient    Overweight (BMI 25.0-29.9)    Essential hypertension    Coronary artery disease involving native coronary artery without angina pectoris    Impaired fasting glucose    History of prostate cancer    Hyperlipidemia    History of coronary artery stent placement    Erectile dysfunction after radical prostatectomy    Malignant neoplasm of prostate (HCC)    Colon adenoma         Assessment and plan:  1.  H/O prostate ca.  Per [facility]  2.  IFG.  Lifestyle and dietary measures, wt loss would be ideal  3.  HLP.  At target on lipitor  4.  CHD.  F/U Dr. Burger, continue current  5.  HTN.  Controlled on current regimen.  6.  Overweight.  Dietary and lifestyle measures  7.  Advocated hd

## 2024-03-28 ENCOUNTER — OFFICE VISIT (OUTPATIENT)
Age: 79
End: 2024-03-28
Payer: MEDICARE

## 2024-03-28 VITALS
WEIGHT: 173 LBS | SYSTOLIC BLOOD PRESSURE: 164 MMHG | BODY MASS INDEX: 25.62 KG/M2 | OXYGEN SATURATION: 96 % | DIASTOLIC BLOOD PRESSURE: 79 MMHG | HEART RATE: 58 BPM | HEIGHT: 69 IN

## 2024-03-28 DIAGNOSIS — Z95.5 HISTORY OF CORONARY ARTERY STENT PLACEMENT: ICD-10-CM

## 2024-03-28 DIAGNOSIS — I10 ESSENTIAL HYPERTENSION: ICD-10-CM

## 2024-03-28 DIAGNOSIS — Z85.46 HISTORY OF PROSTATE CANCER: ICD-10-CM

## 2024-03-28 DIAGNOSIS — E78.00 PURE HYPERCHOLESTEROLEMIA, UNSPECIFIED: ICD-10-CM

## 2024-03-28 DIAGNOSIS — I25.10 ATHEROSCLEROSIS OF NATIVE CORONARY ARTERY OF NATIVE HEART WITHOUT ANGINA PECTORIS: Primary | ICD-10-CM

## 2024-03-28 DIAGNOSIS — I10 ESSENTIAL (PRIMARY) HYPERTENSION: ICD-10-CM

## 2024-03-28 PROCEDURE — 99214 OFFICE O/P EST MOD 30 MIN: CPT | Performed by: INTERNAL MEDICINE

## 2024-03-28 PROCEDURE — G8419 CALC BMI OUT NRM PARAM NOF/U: HCPCS | Performed by: INTERNAL MEDICINE

## 2024-03-28 PROCEDURE — G8427 DOCREV CUR MEDS BY ELIG CLIN: HCPCS | Performed by: INTERNAL MEDICINE

## 2024-03-28 PROCEDURE — 3078F DIAST BP <80 MM HG: CPT | Performed by: INTERNAL MEDICINE

## 2024-03-28 PROCEDURE — G8484 FLU IMMUNIZE NO ADMIN: HCPCS | Performed by: INTERNAL MEDICINE

## 2024-03-28 PROCEDURE — 1123F ACP DISCUSS/DSCN MKR DOCD: CPT | Performed by: INTERNAL MEDICINE

## 2024-03-28 PROCEDURE — 93000 ELECTROCARDIOGRAM COMPLETE: CPT | Performed by: INTERNAL MEDICINE

## 2024-03-28 PROCEDURE — 3077F SYST BP >= 140 MM HG: CPT | Performed by: INTERNAL MEDICINE

## 2024-03-28 PROCEDURE — 1036F TOBACCO NON-USER: CPT | Performed by: INTERNAL MEDICINE

## 2024-03-28 RX ORDER — LISINOPRIL 10 MG/1
10 TABLET ORAL DAILY
Qty: 90 TABLET | Refills: 3 | Status: SHIPPED | OUTPATIENT
Start: 2024-03-28

## 2024-03-28 RX ORDER — OMEPRAZOLE 20 MG/1
20 CAPSULE, DELAYED RELEASE ORAL DAILY
COMMUNITY

## 2024-03-28 ASSESSMENT — ENCOUNTER SYMPTOMS
GASTROINTESTINAL NEGATIVE: 1
RESPIRATORY NEGATIVE: 1
EYES NEGATIVE: 1

## 2024-03-28 NOTE — PROGRESS NOTES
05/11/2023    Dr Maradiaga (2007) polyp; (5/17) polyp; (5/23) divertics and TA -5yr    CORONARY ANGIOPLASTY WITH STENT PLACEMENT  03/2012    prox LAD JANA    LAP,INGUINAL HERNIA REPR,INITIAL Left 01/24/2022    BETSEY (1988); redo 1/22 Dr Cruz    PROSTATECTOMY  10/2015    DVP Dr Hamida Cheatham [facility]    TONSILLECTOMY      TURP  06/18/2010    Dr. Mendoza    TURP  05/24/2010    Dr. Mendoza    UROLOGICAL SURGERY      Dr Mendoza prostate bx neg (7/08); Dr Hood prostate bx Javed 3+4 in L Mid, L Base. GS 3+3 in R Ash, R Mid (8/15)       Vitals:    03/28/24 0819 03/28/24 0825   BP: (!) 166/82 (!) 164/79   Pulse: 59 58   SpO2: 96%    Weight: 78.5 kg (173 lb)    Height: 1.753 m (5' 9\")           Diagnostic Studies:  I have reviewed the relevant tests done on the patient and show as follows  EKG tracings reviewed by me today.      Mr. Villalobos has a reminder for a \"due or due soon\" health maintenance. I have asked that he contact his primary care provider for follow-up on this health maintenance.        1/18 Nuc Stress  Conclusion:   1. Normal perfusion scan.   2. Normal wall motion and ejection fraction.   3. No evidence of significant fixed or reversible defect suggesting ischemia or myocardial infarction noted from this nuclear study.  4. Low risk scan.        ASSESSMENT & PLAN    Lab Results   Component Value Date    CHOL 109 01/02/2024    CHOL 134 12/27/2022    CHOL 153 12/20/2021    CHOL 157 12/21/2020    CHOL 153 12/16/2019     Lab Results   Component Value Date    TRIG 36 01/02/2024    TRIG 65 12/27/2022    TRIG 94 12/20/2021    TRIG 53 12/21/2020    TRIG 43 12/16/2019     Lab Results   Component Value Date    HDL 56 01/02/2024    HDL 65 (H) 12/27/2022    HDL 76 (H) 12/20/2021    HDL 81 (H) 12/21/2020    HDL 74 (H) 12/16/2019     Lab Results   Component Value Date    LDLCALC 45.8 01/02/2024    LDLCALC 56 12/27/2022    LDLCALC 58.2 12/20/2021    LDLCALC 65.4 12/21/2020    LDLCALC 70.4 12/16/2019     No results found

## 2024-06-23 RX ORDER — SILDENAFIL 100 MG/1
100 TABLET, FILM COATED ORAL PRN
Qty: 10 TABLET | Refills: 11 | Status: SHIPPED | OUTPATIENT
Start: 2024-06-23

## 2024-06-26 RX ORDER — SILDENAFIL 100 MG/1
TABLET, FILM COATED ORAL
Qty: 90 TABLET | Refills: 0 | OUTPATIENT
Start: 2024-06-26

## 2024-07-16 ENCOUNTER — OFFICE VISIT (OUTPATIENT)
Facility: CLINIC | Age: 79
End: 2024-07-16
Payer: MEDICARE

## 2024-07-16 VITALS
DIASTOLIC BLOOD PRESSURE: 70 MMHG | HEART RATE: 80 BPM | TEMPERATURE: 98.6 F | SYSTOLIC BLOOD PRESSURE: 136 MMHG | RESPIRATION RATE: 16 BRPM | OXYGEN SATURATION: 98 % | HEIGHT: 69 IN | BODY MASS INDEX: 24.73 KG/M2 | WEIGHT: 167 LBS

## 2024-07-16 DIAGNOSIS — R07.89 CHEST WALL PAIN: Primary | ICD-10-CM

## 2024-07-16 DIAGNOSIS — M54.6 CHRONIC BILATERAL THORACIC BACK PAIN: ICD-10-CM

## 2024-07-16 DIAGNOSIS — G89.29 CHRONIC BILATERAL THORACIC BACK PAIN: ICD-10-CM

## 2024-07-16 PROCEDURE — 1123F ACP DISCUSS/DSCN MKR DOCD: CPT | Performed by: INTERNAL MEDICINE

## 2024-07-16 PROCEDURE — G8427 DOCREV CUR MEDS BY ELIG CLIN: HCPCS | Performed by: INTERNAL MEDICINE

## 2024-07-16 PROCEDURE — 3075F SYST BP GE 130 - 139MM HG: CPT | Performed by: INTERNAL MEDICINE

## 2024-07-16 PROCEDURE — 1036F TOBACCO NON-USER: CPT | Performed by: INTERNAL MEDICINE

## 2024-07-16 PROCEDURE — 3078F DIAST BP <80 MM HG: CPT | Performed by: INTERNAL MEDICINE

## 2024-07-16 PROCEDURE — G8420 CALC BMI NORM PARAMETERS: HCPCS | Performed by: INTERNAL MEDICINE

## 2024-07-16 PROCEDURE — 99213 OFFICE O/P EST LOW 20 MIN: CPT | Performed by: INTERNAL MEDICINE

## 2024-07-16 RX ORDER — IBUPROFEN 800 MG/1
800 TABLET ORAL EVERY 6 HOURS PRN
COMMUNITY
End: 2024-07-16 | Stop reason: ALTCHOICE

## 2024-07-16 RX ORDER — MELOXICAM 15 MG/1
15 TABLET ORAL DAILY PRN
Qty: 30 TABLET | Refills: 0 | Status: SHIPPED | OUTPATIENT
Start: 2024-07-16

## 2024-07-16 NOTE — PROGRESS NOTES
Luis Villalobos Jr. presents today for   Chief Complaint   Patient presents with    Back Strain     Patient states getting \"jolting\" pain, 5-6/10, doing certain movements. Muscle on the right side lower back. Very mild for 3-5 months; while playing golf was trying to imitate other golfers and swung differently.    \"Have you been to the ER, urgent care clinic since your last visit?  Hospitalized since your last visit?\"    NO    “Have you seen or consulted any other health care providers outside of [facility] since your last visit?”    NO

## 2024-07-16 NOTE — PROGRESS NOTES
78 y.o. male who presents for evaluation.    He golfs a lot, about 6 months ago he tweaked his right mid back while changing his swing.  He has been having pain off and on since then, really has not cleared fully.  Does not really use OTC meds outside of occasional ibuprofen 800 which helps  About 3 weeks ago, he was moving a very heavy object on his bridger, was manipulating it and somehow tweaked his lower latissimus area, very close to where his pain has been intermittently has been for the last few months.  Made it worse, he's been using ibuprofen more.  No other known trauma, denies any radicular complaints, no bruising or rash.  It hurts to turn or twist his torso.  No assoc gi or urinary complaints    Past Medical History:   Diagnosis Date    CAD (coronary artery disease) 04/2012    s/p stent LAD Dr. Burger    Colon adenoma 05/2023    Dr Maradiaga    Colon polyp     2007; Dr Maradiaga 5/17    COVID-19 vaccine dose declined 01/2023    boosters    Diverticulosis 05/2023    Dr Maradiaga    Erectile dysfunction after radical prostatectomy 12/28/2020    Frozen shoulder syndrome 2007    Dr. Mcmahan    Hyperlipidemia     Hypertension     IBS (irritable bowel syndrome)     IFG (impaired fasting glucose) 03/2012    Obesity     peak weight 217 lbs, bmi 32 from 12/14; IF 1/18 start weight 190+    Phymatous rosacea     Prostate cancer (HCC)     T2C [place] 7 Dr Hood 8/15; DVP Dr Mei 10/15    Skin cancer     Dr Avelino SINGH; SCC nose s/p Moh's 2016    Vertigo     Zoster 1980s     Current Outpatient Medications   Medication Sig    ibuprofen (ADVIL;MOTRIN) 800 MG tablet Take 1 tablet by mouth every 6 hours as needed for Pain    lisinopril (PRINIVIL;ZESTRIL) 10 MG tablet Take 1 tablet by mouth daily    atorvastatin (LIPITOR) 40 MG tablet TAKE 1 TABLET EVERY DAY    aspirin 81 MG EC tablet Take 1 tablet by mouth daily    vitamin D 25 MCG (1000 UT) CAPS Take by mouth daily    Lutein-Zeaxanthin 25-5 MG CAPS Take by mouth daily

## 2024-09-03 RX ORDER — ATORVASTATIN CALCIUM 40 MG/1
TABLET, FILM COATED ORAL
Qty: 90 TABLET | Refills: 3 | Status: SHIPPED | OUTPATIENT
Start: 2024-09-03

## 2024-09-10 ENCOUNTER — TELEPHONE (OUTPATIENT)
Facility: CLINIC | Age: 79
End: 2024-09-10

## 2024-09-10 DIAGNOSIS — M54.50 LOW BACK PAIN, UNSPECIFIED BACK PAIN LATERALITY, UNSPECIFIED CHRONICITY, UNSPECIFIED WHETHER SCIATICA PRESENT: Primary | ICD-10-CM

## 2025-01-03 ENCOUNTER — HOSPITAL ENCOUNTER (OUTPATIENT)
Facility: HOSPITAL | Age: 80
Setting detail: SPECIMEN
Discharge: HOME | End: 2025-01-03
Payer: MEDICARE

## 2025-01-03 DIAGNOSIS — E78.5 HYPERLIPIDEMIA, UNSPECIFIED HYPERLIPIDEMIA TYPE: ICD-10-CM

## 2025-01-03 DIAGNOSIS — I10 ESSENTIAL HYPERTENSION: ICD-10-CM

## 2025-01-03 DIAGNOSIS — R73.01 IMPAIRED FASTING GLUCOSE: ICD-10-CM

## 2025-01-03 DIAGNOSIS — Z85.46 HISTORY OF PROSTATE CANCER: ICD-10-CM

## 2025-01-03 LAB
ALBUMIN SERPL-MCNC: 3.8 G/DL (ref 3.4–5)
ALBUMIN/GLOB SERPL: 1.4 (ref 0.8–1.7)
ALP SERPL-CCNC: 77 U/L (ref 45–117)
ALT SERPL-CCNC: 27 U/L (ref 16–61)
ANION GAP SERPL CALC-SCNC: 2 MMOL/L (ref 3–18)
AST SERPL-CCNC: 20 U/L (ref 10–38)
BASOPHILS # BLD: 0.1 K/UL (ref 0–0.1)
BASOPHILS NFR BLD: 1 % (ref 0–2)
BILIRUB SERPL-MCNC: 1 MG/DL (ref 0.2–1)
BUN SERPL-MCNC: 16 MG/DL (ref 7–18)
BUN/CREAT SERPL: 16 (ref 12–20)
CALCIUM SERPL-MCNC: 8.8 MG/DL (ref 8.5–10.1)
CHLORIDE SERPL-SCNC: 107 MMOL/L (ref 100–111)
CHOLEST SERPL-MCNC: 142 MG/DL
CO2 SERPL-SCNC: 30 MMOL/L (ref 21–32)
CREAT SERPL-MCNC: 0.99 MG/DL (ref 0.6–1.3)
DIFFERENTIAL METHOD BLD: ABNORMAL
EOSINOPHIL # BLD: 0.2 K/UL (ref 0–0.4)
EOSINOPHIL NFR BLD: 3 % (ref 0–5)
ERYTHROCYTE [DISTWIDTH] IN BLOOD BY AUTOMATED COUNT: 13 % (ref 11.6–14.5)
GLOBULIN SER CALC-MCNC: 2.8 G/DL (ref 2–4)
GLUCOSE SERPL-MCNC: 109 MG/DL (ref 74–99)
HBA1C MFR BLD: 5.3 % (ref 4.2–5.6)
HCT VFR BLD AUTO: 43.6 % (ref 36–48)
HDLC SERPL-MCNC: 86 MG/DL (ref 40–60)
HDLC SERPL: 1.7 (ref 0–5)
HGB BLD-MCNC: 13.9 G/DL (ref 13–16)
IMM GRANULOCYTES # BLD AUTO: 0 K/UL (ref 0–0.04)
IMM GRANULOCYTES NFR BLD AUTO: 0 % (ref 0–0.5)
LDLC SERPL CALC-MCNC: 47.4 MG/DL (ref 0–100)
LIPID PANEL: ABNORMAL
LYMPHOCYTES # BLD: 2 K/UL (ref 0.9–3.6)
LYMPHOCYTES NFR BLD: 38 % (ref 21–52)
MCH RBC QN AUTO: 32.3 PG (ref 24–34)
MCHC RBC AUTO-ENTMCNC: 31.9 G/DL (ref 31–37)
MCV RBC AUTO: 101.2 FL (ref 78–100)
MONOCYTES # BLD: 0.4 K/UL (ref 0.05–1.2)
MONOCYTES NFR BLD: 8 % (ref 3–10)
NEUTS SEG # BLD: 2.6 K/UL (ref 1.8–8)
NEUTS SEG NFR BLD: 50 % (ref 40–73)
NRBC # BLD: 0 K/UL (ref 0–0.01)
NRBC BLD-RTO: 0 PER 100 WBC
PLATELET # BLD AUTO: 201 K/UL (ref 135–420)
PMV BLD AUTO: 10.5 FL (ref 9.2–11.8)
POTASSIUM SERPL-SCNC: 5.2 MMOL/L (ref 3.5–5.5)
PROT SERPL-MCNC: 6.6 G/DL (ref 6.4–8.2)
PSA SERPL-MCNC: 0 NG/ML (ref 0–4)
RBC # BLD AUTO: 4.31 M/UL (ref 4.35–5.65)
SODIUM SERPL-SCNC: 139 MMOL/L (ref 136–145)
TRIGL SERPL-MCNC: 43 MG/DL
VLDLC SERPL CALC-MCNC: 8.6 MG/DL
WBC # BLD AUTO: 5.1 K/UL (ref 4.6–13.2)

## 2025-01-03 PROCEDURE — 80053 COMPREHEN METABOLIC PANEL: CPT

## 2025-01-03 PROCEDURE — 85025 COMPLETE CBC W/AUTO DIFF WBC: CPT

## 2025-01-03 PROCEDURE — 83036 HEMOGLOBIN GLYCOSYLATED A1C: CPT

## 2025-01-03 PROCEDURE — 84153 ASSAY OF PSA TOTAL: CPT

## 2025-01-03 PROCEDURE — 80061 LIPID PANEL: CPT

## 2025-01-03 PROCEDURE — 36415 COLL VENOUS BLD VENIPUNCTURE: CPT

## 2025-01-04 NOTE — PROGRESS NOTES
79 y.o. male who presents for evaluation.    No cardiovascular complaints and he sees Dr. Burger yearly usaully in Feb. Walks, plays golf and table tennis, lawn work and no problems to report.  He checks his bp at home and it's been running higher in the 140-150s systolic, he had stopped the metoprolol?      Denies polyuria, polydipsia, nocturia, vision change.  Not checking sugars at this time.     He underwent DaVinci prostatectomy by Dr Fernandez Mei in [facility] 10/15.  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 Burger    Colon adenoma 05/2023    Dr Maradiaga    Colon polyp     2007; Dr Maradiaga 5/17    COVID-19 vaccine dose declined 01/2023    boosters    Diverticulosis 05/2023    Dr Maradiaga    Erectile dysfunction after radical prostatectomy 12/28/2020    Frozen shoulder syndrome 2007    Dr. Mcmahan    Fuchs' corneal dystrophy 2024    [facility] eye; s/p laser    Hyperlipidemia     Hypertension     COMPONENT OF WHITE COAT    IBS (irritable bowel syndrome)     IFG (impaired fasting glucose) 03/2012    Obesity     peak weight 217 lbs, bmi 32 from 12/14; IF 1/18 start weight 190+    Phymatous rosacea     Prostate cancer (HCC)     T2C [place] 7 Dr Hood 8/15; DVP Dr Mei 10/15    Skin cancer     Dr Avelino FUNEZ RUE; SCC nose s/p Moh's 2016    Vertigo     Zoster 1980s     Past Surgical History:   Procedure Laterality Date    CATARACT REMOVAL Bilateral 2019    Dr Davis    COLONOSCOPY N/A 05/11/2023    Dr Maradiaga (2007) polyp; (5/17) polyp; (5/23) divertics and TA -5yr    CORONARY ANGIOPLASTY WITH STENT PLACEMENT  03/2012    prox LAD JANA    LAP,INGUINAL

## 2025-01-10 ENCOUNTER — OFFICE VISIT (OUTPATIENT)
Facility: CLINIC | Age: 80
End: 2025-01-10

## 2025-01-10 VITALS
TEMPERATURE: 98.4 F | BODY MASS INDEX: 26.07 KG/M2 | RESPIRATION RATE: 18 BRPM | DIASTOLIC BLOOD PRESSURE: 73 MMHG | HEART RATE: 71 BPM | SYSTOLIC BLOOD PRESSURE: 156 MMHG | HEIGHT: 69 IN | OXYGEN SATURATION: 100 % | WEIGHT: 176 LBS

## 2025-01-10 DIAGNOSIS — R73.01 IMPAIRED FASTING GLUCOSE: ICD-10-CM

## 2025-01-10 DIAGNOSIS — N52.31 ERECTILE DYSFUNCTION AFTER RADICAL PROSTATECTOMY: ICD-10-CM

## 2025-01-10 DIAGNOSIS — I10 ESSENTIAL HYPERTENSION: ICD-10-CM

## 2025-01-10 DIAGNOSIS — Z71.89 ADVANCED CARE PLANNING/COUNSELING DISCUSSION: ICD-10-CM

## 2025-01-10 DIAGNOSIS — D12.6 COLON ADENOMA: ICD-10-CM

## 2025-01-10 DIAGNOSIS — D75.89 MACROCYTOSIS: ICD-10-CM

## 2025-01-10 DIAGNOSIS — E78.5 HYPERLIPIDEMIA, UNSPECIFIED HYPERLIPIDEMIA TYPE: ICD-10-CM

## 2025-01-10 DIAGNOSIS — Z00.00 MEDICARE ANNUAL WELLNESS VISIT, SUBSEQUENT: Primary | ICD-10-CM

## 2025-01-10 DIAGNOSIS — C61 PROSTATE CANCER (HCC): ICD-10-CM

## 2025-01-10 DIAGNOSIS — I25.10 CORONARY ARTERY DISEASE INVOLVING NATIVE CORONARY ARTERY OF NATIVE HEART WITHOUT ANGINA PECTORIS: ICD-10-CM

## 2025-01-10 RX ORDER — SILDENAFIL 100 MG/1
100 TABLET, FILM COATED ORAL DAILY PRN
Qty: 10 TABLET | Refills: 5 | Status: SHIPPED | OUTPATIENT
Start: 2025-01-10

## 2025-01-10 SDOH — ECONOMIC STABILITY: FOOD INSECURITY: WITHIN THE PAST 12 MONTHS, THE FOOD YOU BOUGHT JUST DIDN'T LAST AND YOU DIDN'T HAVE MONEY TO GET MORE.: NEVER TRUE

## 2025-01-10 SDOH — ECONOMIC STABILITY: FOOD INSECURITY: WITHIN THE PAST 12 MONTHS, YOU WORRIED THAT YOUR FOOD WOULD RUN OUT BEFORE YOU GOT MONEY TO BUY MORE.: NEVER TRUE

## 2025-01-10 ASSESSMENT — PATIENT HEALTH QUESTIONNAIRE - PHQ9
SUM OF ALL RESPONSES TO PHQ9 QUESTIONS 1 & 2: 0
SUM OF ALL RESPONSES TO PHQ QUESTIONS 1-9: 0
2. FEELING DOWN, DEPRESSED OR HOPELESS: NOT AT ALL
1. LITTLE INTEREST OR PLEASURE IN DOING THINGS: NOT AT ALL
SUM OF ALL RESPONSES TO PHQ QUESTIONS 1-9: 0

## 2025-01-10 ASSESSMENT — LIFESTYLE VARIABLES
HOW OFTEN DO YOU HAVE A DRINK CONTAINING ALCOHOL: MONTHLY OR LESS
HOW MANY STANDARD DRINKS CONTAINING ALCOHOL DO YOU HAVE ON A TYPICAL DAY: 1 OR 2

## 2025-01-10 NOTE — PROGRESS NOTES
Luis Villalobos Jr. presents today for   Chief Complaint   Patient presents with    Medicare AWV           \"Have you been to the ER, urgent care clinic since your last visit?  Hospitalized since your last visit?\"    NO    “Have you seen or consulted any other health care providers outside of [facility] since your last visit?”    NO

## 2025-01-10 NOTE — PROGRESS NOTES
Medicare Annual Wellness Visit    Luis Villalobos JrJose is here for Medicare AWV    Assessment & Plan   Advanced care planning/counseling discussion  Prostate cancer (HCC)  Coronary artery disease involving native coronary artery of native heart without angina pectoris  Essential hypertension  -     Comprehensive Metabolic Panel; Future  -     CBC with Auto Differential; Future  Colon adenoma  Impaired fasting glucose  -     HEMOGLOBIN A1C W/O EAG; Future  Hyperlipidemia, unspecified hyperlipidemia type  -     Lipid Panel; Future  Erectile dysfunction after radical prostatectomy  -     sildenafil (VIAGRA) 100 MG tablet; Take 1 tablet by mouth daily as needed for Erectile Dysfunction, Disp-10 tablet, R-5Normal  Macrocytosis  -     Vitamin B12 & Folate; Future  -     Methylmalonic Acid, Serum; Future  Medicare annual wellness visit, subsequent       Return in 1 year (on 1/10/2026) for Medicare AWV.     Subjective       Patient's complete Health Risk Assessment and screening values have been reviewed and are found in Flowsheets. The following problems were reviewed today and where indicated follow up appointments were made and/or referrals ordered.    Positive Risk Factor Screenings with Interventions:     Cognitive:   Clock Drawing Test (CDT): (!) Abnormal  Words recalled: 3 Words Recalled  Total Score: 3  Total Score Interpretation: Normal Mini-Cog  Interventions:  Patient declines any further evaluation or treatment                 Vision Screen:  Do you have difficulty driving, watching TV, or doing any of your daily activities because of your eyesight?: No  Have you had an eye exam within the past year?: (!) No  Interventions:   Patient declines any further evaluation or treatment      Advanced Directives:  Do you have a Living Will?: (!) No    Intervention:  see ACP note                     Objective   Vitals:    01/10/25 0758   BP: (!) 156/73   Pulse: 71   Resp: 18   Temp: 98.4 °F (36.9 °C)   TempSrc: Temporal

## 2025-01-10 NOTE — ACP (ADVANCE CARE PLANNING)
Advance Care Planning     Advance Care Planning (ACP) Physician/NP/PA Conversation    Date of Conversation: 1/10/2025  Conducted with: Patient with Decision Making Capacity    Healthcare Decision Maker:      Primary Decision Maker: Cookie Villalobos - Spouse - [phone]    Click here to complete Healthcare Decision Makers including selection of the Healthcare Decision Maker Relationship (ie \"Primary\")  Today we documented Decision Maker(s) consistent with Legal Next of Kin hierarchy.    Care Preferences:    Hospitalization:  \"If your health worsens and it becomes clear that your chance of recovery is unlikely, what would be your preference regarding hospitalization?\"  The patient would prefer hospitalization.    Ventilation:  \"If you were unable to breath on your own and your chance of recovery was unlikely, what would be your preference about the use of a ventilator (breathing machine) if it was available to you?\"  The patient would desire the use of a ventilator.    Resuscitation:  \"In the event your heart stopped as a result of an underlying serious health condition, would you want attempts made to restart your heart, or would you prefer a natural death?\"  Yes, attempt to resuscitate.    ventilation preferences, hospitalization preferences, and resuscitation preferences    Conversation Outcomes / Follow-Up Plan:  ACP in process - information provided, considering goals and options  Reviewed DNR/DNI and patient elects Full Code (Attempt Resuscitation)    Length of Voluntary ACP Conversation in minutes:  16 minutes    CAROL BURR MD                        Family notified

## 2025-01-15 DIAGNOSIS — I10 ESSENTIAL (PRIMARY) HYPERTENSION: ICD-10-CM

## 2025-01-15 RX ORDER — LISINOPRIL 10 MG/1
10 TABLET ORAL DAILY
Qty: 90 TABLET | Refills: 3 | Status: SHIPPED | OUTPATIENT
Start: 2025-01-15

## 2025-04-14 RX ORDER — METOPROLOL TARTRATE 25 MG/1
25 TABLET, FILM COATED ORAL 2 TIMES DAILY
Qty: 180 TABLET | Refills: 3 | Status: SHIPPED | OUTPATIENT
Start: 2025-04-14

## 2025-05-06 ENCOUNTER — OFFICE VISIT (OUTPATIENT)
Age: 80
End: 2025-05-06
Payer: MEDICARE

## 2025-05-06 VITALS
HEIGHT: 69 IN | HEART RATE: 56 BPM | OXYGEN SATURATION: 96 % | SYSTOLIC BLOOD PRESSURE: 175 MMHG | DIASTOLIC BLOOD PRESSURE: 83 MMHG | WEIGHT: 174 LBS | BODY MASS INDEX: 25.77 KG/M2

## 2025-05-06 DIAGNOSIS — E78.49 OTHER HYPERLIPIDEMIA: ICD-10-CM

## 2025-05-06 DIAGNOSIS — I10 ESSENTIAL (PRIMARY) HYPERTENSION: ICD-10-CM

## 2025-05-06 DIAGNOSIS — I25.10 CORONARY ARTERY DISEASE INVOLVING NATIVE CORONARY ARTERY OF NATIVE HEART WITHOUT ANGINA PECTORIS: Primary | ICD-10-CM

## 2025-05-06 DIAGNOSIS — Z95.5 HISTORY OF CORONARY ARTERY STENT PLACEMENT: ICD-10-CM

## 2025-05-06 PROCEDURE — 1159F MED LIST DOCD IN RCRD: CPT | Performed by: INTERNAL MEDICINE

## 2025-05-06 PROCEDURE — 93000 ELECTROCARDIOGRAM COMPLETE: CPT | Performed by: INTERNAL MEDICINE

## 2025-05-06 PROCEDURE — G8427 DOCREV CUR MEDS BY ELIG CLIN: HCPCS | Performed by: INTERNAL MEDICINE

## 2025-05-06 PROCEDURE — 3079F DIAST BP 80-89 MM HG: CPT | Performed by: INTERNAL MEDICINE

## 2025-05-06 PROCEDURE — 1160F RVW MEDS BY RX/DR IN RCRD: CPT | Performed by: INTERNAL MEDICINE

## 2025-05-06 PROCEDURE — 1036F TOBACCO NON-USER: CPT | Performed by: INTERNAL MEDICINE

## 2025-05-06 PROCEDURE — 1126F AMNT PAIN NOTED NONE PRSNT: CPT | Performed by: INTERNAL MEDICINE

## 2025-05-06 PROCEDURE — G8419 CALC BMI OUT NRM PARAM NOF/U: HCPCS | Performed by: INTERNAL MEDICINE

## 2025-05-06 PROCEDURE — 1123F ACP DISCUSS/DSCN MKR DOCD: CPT | Performed by: INTERNAL MEDICINE

## 2025-05-06 PROCEDURE — 99214 OFFICE O/P EST MOD 30 MIN: CPT | Performed by: INTERNAL MEDICINE

## 2025-05-06 PROCEDURE — 3077F SYST BP >= 140 MM HG: CPT | Performed by: INTERNAL MEDICINE

## 2025-05-06 RX ORDER — LISINOPRIL 20 MG/1
20 TABLET ORAL DAILY
Qty: 90 TABLET | Refills: 3 | Status: SHIPPED | OUTPATIENT
Start: 2025-05-06

## 2025-05-06 ASSESSMENT — PATIENT HEALTH QUESTIONNAIRE - PHQ9
SUM OF ALL RESPONSES TO PHQ QUESTIONS 1-9: 0
1. LITTLE INTEREST OR PLEASURE IN DOING THINGS: NOT AT ALL
2. FEELING DOWN, DEPRESSED OR HOPELESS: NOT AT ALL
SUM OF ALL RESPONSES TO PHQ QUESTIONS 1-9: 0

## 2025-05-06 ASSESSMENT — ENCOUNTER SYMPTOMS
EYES NEGATIVE: 1
RESPIRATORY NEGATIVE: 1
GASTROINTESTINAL NEGATIVE: 1

## 2025-05-06 NOTE — PROGRESS NOTES
1. Have you been to the ER, urgent care clinic since your last visit?  Hospitalized since your last visit?     no      2.  Where do you normally have your labs drawn?   PCP    3. Do you need any refills today?   no    4. Which local pharmacy do you use (enter pharmacy)?   [facility] [address]    5. Which mail order pharmacy do you use (enter pharmacy)?   [facility]     6. Are you here for surgical clearance and if so who will be doing your     procedure/surgery (care team)?   no      
will follow his blood pressures at home and call if they are more than 140/85.  Lipids are controlled well.  Continue atorvastatin.  EKG is unremarkable.  Exercise Plan: Continue to be active and exercise regularly.  Diet plan: Mediterranean diet guidelines explained and printed.    Diagnoses and all orders for this visit:    Coronary artery disease involving native coronary artery of native heart without angina pectoris    Essential (primary) hypertension  -     EKG 12 Lead  -     lisinopril (PRINIVIL;ZESTRIL) 20 MG tablet; Take 1 tablet by mouth daily    History of coronary artery stent placement    Other hyperlipidemia          See patient instructions also for other medical advice given    Medications Discontinued During This Encounter   Medication Reason    meloxicam (MOBIC) 15 MG tablet Therapy completed    lisinopril (PRINIVIL;ZESTRIL) 10 MG tablet REORDER       Follow-up and Dispositions    Return in about 6 months (around 11/6/2025), or if symptoms worsen or fail to improve, for BP log x 3-5 days post med changes.           Return to ER if any significant CP not relieved by s/l NTG, severe SOB, severe palpitations, loss of consciousness    5/6/2025  Plan for medicines : Increase lisinopril to better control the blood pressure and follow the home chart.  Continue other current cardiac medications.  Lipids are excellent.  CAD stable.  Exercise Plan: Continue regular exercises as he does now.  Diet plan: Mediterranean diet guidelines reinforced.

## 2025-06-26 RX ORDER — ATORVASTATIN CALCIUM 40 MG/1
40 TABLET, FILM COATED ORAL DAILY
Qty: 90 TABLET | Refills: 3 | Status: SHIPPED | OUTPATIENT
Start: 2025-06-26

## (undated) DEVICE — SUTURE MCRYL SZ 4-0 L27IN ABSRB UD L24MM PS-1 3/8 CIR PRIM Y935H

## (undated) DEVICE — 3M™ IOBAN™ 2 ANTIMICROBIAL INCISE DRAPE 6651EZ: Brand: IOBAN™ 2

## (undated) DEVICE — CANNULA NSL AD TBNG L14FT STD PVC O2 CRV CONN NONFLARED NSL

## (undated) DEVICE — YANKAUER,SMOOTH HANDLE,HIGH CAPACITY: Brand: MEDLINE INDUSTRIES, INC.

## (undated) DEVICE — STERILE POLYISOPRENE POWDER-FREE SURGICAL GLOVES: Brand: PROTEXIS

## (undated) DEVICE — GLOVE SURG SZ 8 L11.77IN FNGR THK9.8MIL STRW LTX POLYMER

## (undated) DEVICE — CATHETER SUCT TR FL TIP 14FR W/ O CTRL

## (undated) DEVICE — SYRINGE MED 10ML LUERLOCK TIP W/O SFTY DISP

## (undated) DEVICE — LINER SUCT CANSTR 3000CC PLAS SFT PRE ASSEMB W/OUT TBNG W/

## (undated) DEVICE — GAUZE,SPONGE,4"X4",16PLY,STRL,LF,10/TRAY: Brand: MEDLINE

## (undated) DEVICE — SYRINGE MED 25GA 3ML L5/8IN SUBQ PLAS W/ DETACH NDL SFTY

## (undated) DEVICE — GARMENT,MEDLINE,DVT,INT,CALF,FOAM,MED: Brand: MEDLINE

## (undated) DEVICE — COVER LT HNDL BLU STRL -- MEDICHOICE

## (undated) DEVICE — SEAL UNIV 5-8MM DISP BX/10 -- DA VINCI XI - SNGL USE

## (undated) DEVICE — SNARE POLYP M W27MMXL240CM OVL STIFF DISP CAPTIVATOR

## (undated) DEVICE — ENDOSCOPY PUMP TUBING/ CAP SET: Brand: ERBE

## (undated) DEVICE — BLANKET WRM W40.2XL55.9IN IORT LO BODY + MISTRAL AIR

## (undated) DEVICE — COVER MPLR TIP CRV SCIS ACC DA VINCI

## (undated) DEVICE — INTENDED FOR TISSUE SEPARATION, AND OTHER PROCEDURES THAT REQUIRE A SHARP SURGICAL BLADE TO PUNCTURE OR CUT.: Brand: BARD-PARKER ®  SAFETY SCALPED

## (undated) DEVICE — NEEDLE HYPO 25GA L1.5IN BVL ORIENTED ECLIPSE

## (undated) DEVICE — STRIP,CLOSURE,WOUND,MEDI-STRIP,1/2X4: Brand: MEDLINE

## (undated) DEVICE — VISUALIZATION SYSTEM: Brand: CLEARIFY

## (undated) DEVICE — SYRINGE 20ML LL S/C 50

## (undated) DEVICE — CANNULA ORIG TL CLR W FOAM CUSHIONS AND 14FT SUPL TB 3 CHN

## (undated) DEVICE — ELECTRO LUBE IS A SINGLE PATIENT USE DEVICE THAT IS INTENDED TO BE USED ON ELECTROSURGICAL ELECTRODES TO REDUCE STICKING.: Brand: KEY SURGICAL ELECTRO LUBE

## (undated) DEVICE — COLUMN DRAPE

## (undated) DEVICE — GOWN PLASTIC FILM THMBHKS UNIV BLUE: Brand: CARDINAL HEALTH

## (undated) DEVICE — SOLUTION IV 1000ML 0.9% SOD CHL

## (undated) DEVICE — MAYO STAND COVER: Brand: CONVERTORS

## (undated) DEVICE — GLOVE SURG BIOGEL 8.0 STRL -- SKINSENSE

## (undated) DEVICE — BLADELESS OBTURATOR: Brand: WECK VISTA

## (undated) DEVICE — DRAPE TOWEL: Brand: CONVERTORS

## (undated) DEVICE — SYRINGE MED 50ML LUERSLIP TIP

## (undated) DEVICE — SYR 10ML LUER LOK 1/5ML GRAD --

## (undated) DEVICE — UNDERPAD INCONT W23XL36IN STD BLU POLYPR BK FLUF SFT

## (undated) DEVICE — SOLUTION IRRIG 1000ML H2O STRL BLT

## (undated) DEVICE — FORCEPS BX L240CM JAW DIA2.8MM L CAP W/ NDL MIC MESH TOOTH

## (undated) DEVICE — SUTURE V-LOC 180 SZ 0 L9IN ABSRB GRN GS-21 L37MM 1/2 CIR VLOCL0346

## (undated) DEVICE — Device

## (undated) DEVICE — AIRSEAL BIFURCATED SMOKE EVAC FILTERED TUBE SET: Brand: AIRSEAL

## (undated) DEVICE — KIT CLN UP BON SECOURS MARYV

## (undated) DEVICE — REM POLYHESIVE ADULT PATIENT RETURN ELECTRODE: Brand: VALLEYLAB

## (undated) DEVICE — TRAY,URINE METER,100% SILICONE,16FR10ML: Brand: MEDLINE

## (undated) DEVICE — MASTISOL ADHESIVE LIQ 2/3ML

## (undated) DEVICE — SUTURE VCRL SZ 2-0 L27IN ABSRB VLT L26MM CT-2 1/2 CIR J333H

## (undated) DEVICE — BANDAGE ADH W0.75XL3IN UNIV WVN FAB NAT GEN USE STRP N ADH

## (undated) DEVICE — TRAP SPEC POLYP REM STRNR CLN DSGN MAGNIFYING WIND DISP

## (undated) DEVICE — MEDI-VAC NON-CONDUCTIVE SUCTION TUBING: Brand: CARDINAL HEALTH

## (undated) DEVICE — SUTURE VCRL SZ 2-0 L27IN ABSRB VLT L36MM CT-1 1/2 CIR J339H

## (undated) DEVICE — TRAY PREP DRY W/ PREM GLV 2 APPL 6 SPNG 2 UNDPD 1 OVERWRAP

## (undated) DEVICE — ARM DRAPE